# Patient Record
Sex: FEMALE | Race: WHITE | NOT HISPANIC OR LATINO | Employment: OTHER | URBAN - METROPOLITAN AREA
[De-identification: names, ages, dates, MRNs, and addresses within clinical notes are randomized per-mention and may not be internally consistent; named-entity substitution may affect disease eponyms.]

---

## 2023-04-14 LAB
CHOLESTEROL (MG/DL) IN SER/PLAS: 123 MG/DL (ref 0–199)
CHOLESTEROL IN HDL (MG/DL) IN SER/PLAS: 30.9 MG/DL
CHOLESTEROL/HDL RATIO: 4
LDL: 29 MG/DL (ref 0–99)
NON HDL CHOLESTEROL: 92 MG/DL
TRIGLYCERIDE (MG/DL) IN SER/PLAS: 317 MG/DL (ref 0–149)
VLDL: 63 MG/DL (ref 0–40)

## 2023-07-05 LAB
ALBUMIN (G/DL) IN SER/PLAS: 3.9 G/DL (ref 3.4–5)
ANION GAP IN SER/PLAS: 11 MMOL/L (ref 10–20)
CALCIUM (MG/DL) IN SER/PLAS: 8.9 MG/DL (ref 8.6–10.3)
CARBON DIOXIDE, TOTAL (MMOL/L) IN SER/PLAS: 24 MMOL/L (ref 21–32)
CHLORIDE (MMOL/L) IN SER/PLAS: 107 MMOL/L (ref 98–107)
CREATININE (MG/DL) IN SER/PLAS: 1.34 MG/DL (ref 0.5–1.05)
GFR FEMALE: 49 ML/MIN/1.73M2
GLUCOSE (MG/DL) IN SER/PLAS: 125 MG/DL (ref 74–99)
MAGNESIUM (MG/DL) IN SER/PLAS: 2.12 MG/DL (ref 1.6–2.4)
PHOSPHATE (MG/DL) IN SER/PLAS: 3.4 MG/DL (ref 2.5–4.9)
POTASSIUM (MMOL/L) IN SER/PLAS: 3.7 MMOL/L (ref 3.5–5.3)
SODIUM (MMOL/L) IN SER/PLAS: 138 MMOL/L (ref 136–145)
UREA NITROGEN (MG/DL) IN SER/PLAS: 11 MG/DL (ref 6–23)

## 2023-08-29 PROBLEM — L02.229 FURUNCLE OF TRUNK, UNSPECIFIED: Status: ACTIVE | Noted: 2022-05-16

## 2023-08-29 PROBLEM — D22.70 MELANOCYTIC NEVI OF LOWER EXTREMITY OR HIP: Status: ACTIVE | Noted: 2022-05-16

## 2023-08-29 PROBLEM — N18.4 STAGE 4 CHRONIC KIDNEY DISEASE (MULTI): Status: ACTIVE | Noted: 2023-08-29

## 2023-08-29 PROBLEM — E87.6 HYPOKALEMIA: Status: ACTIVE | Noted: 2023-08-29

## 2023-08-29 PROBLEM — Z72.0 TOBACCO ABUSE: Status: ACTIVE | Noted: 2023-08-29

## 2023-08-29 PROBLEM — L81.1 CHLOASMA: Status: ACTIVE | Noted: 2022-05-16

## 2023-08-29 PROBLEM — J34.3 HYPERTROPHY OF BOTH INFERIOR NASAL TURBINATES: Status: ACTIVE | Noted: 2023-08-29

## 2023-08-29 PROBLEM — F31.9 BIPOLAR DISORDER (MULTI): Status: ACTIVE | Noted: 2023-08-29

## 2023-08-29 PROBLEM — E78.1 HYPERTRIGLYCERIDEMIA: Status: ACTIVE | Noted: 2023-08-29

## 2023-08-29 PROBLEM — R21 SKIN ERUPTION: Status: ACTIVE | Noted: 2022-05-16

## 2023-08-29 PROBLEM — E78.1 HIGH TRIGLYCERIDES: Status: ACTIVE | Noted: 2023-08-29

## 2023-08-29 PROBLEM — R42 EPISODIC LIGHTHEADEDNESS: Status: ACTIVE | Noted: 2023-08-29

## 2023-08-29 PROBLEM — J34.2 NASAL SEPTAL DEVIATION: Status: ACTIVE | Noted: 2023-08-29

## 2023-08-29 PROBLEM — D53.9 ANEMIA, MACROCYTIC: Status: ACTIVE | Noted: 2023-08-29

## 2023-08-29 PROBLEM — L91.8 OTHER HYPERTROPHIC DISORDERS OF THE SKIN: Status: ACTIVE | Noted: 2022-05-16

## 2023-08-29 PROBLEM — K21.9 GASTROESOPHAGEAL REFLUX DISEASE: Status: ACTIVE | Noted: 2023-08-29

## 2023-08-29 PROBLEM — R13.10 DYSPHAGIA: Status: ACTIVE | Noted: 2023-08-29

## 2023-08-29 PROBLEM — E78.00 PURE HYPERCHOLESTEROLEMIA: Status: ACTIVE | Noted: 2023-08-29

## 2023-08-29 PROBLEM — I73.00 RAYNAUD PHENOMENON: Status: ACTIVE | Noted: 2023-08-29

## 2023-08-29 PROBLEM — K11.20 SUBMANDIBULAR SIALOADENITIS: Status: ACTIVE | Noted: 2023-08-29

## 2023-08-29 PROBLEM — D18.01 HEMANGIOMA OF SKIN AND SUBCUTANEOUS TISSUE: Status: ACTIVE | Noted: 2022-05-16

## 2023-08-29 PROBLEM — L73.2 HIDRADENITIS SUPPURATIVA: Status: ACTIVE | Noted: 2022-05-16

## 2023-08-29 PROBLEM — G89.29 CHRONIC BACK PAIN: Status: ACTIVE | Noted: 2023-08-29

## 2023-08-29 PROBLEM — R00.2 PALPITATIONS: Status: ACTIVE | Noted: 2023-08-29

## 2023-08-29 PROBLEM — R22.1 NECK SWELLING: Status: ACTIVE | Noted: 2023-08-29

## 2023-08-29 PROBLEM — L81.4 OTHER MELANIN HYPERPIGMENTATION: Status: ACTIVE | Noted: 2022-05-16

## 2023-08-29 PROBLEM — D22.60 MELANOCYTIC NEVI OF UNSPECIFIED UPPER LIMB, INCLUDING SHOULDER: Status: ACTIVE | Noted: 2022-05-16

## 2023-08-29 PROBLEM — J32.9 CHRONIC RHINOSINUSITIS: Status: ACTIVE | Noted: 2023-08-29

## 2023-08-29 PROBLEM — R80.9 PROTEINURIA: Status: ACTIVE | Noted: 2023-08-29

## 2023-08-29 PROBLEM — M54.9 CHRONIC BACK PAIN: Status: ACTIVE | Noted: 2023-08-29

## 2023-08-29 PROBLEM — H92.03 OTALGIA OF BOTH EARS: Status: ACTIVE | Noted: 2023-08-29

## 2023-08-29 PROBLEM — L72.8 OTHER FOLLICULAR CYSTS OF THE SKIN AND SUBCUTANEOUS TISSUE: Status: ACTIVE | Noted: 2022-05-16

## 2023-08-29 PROBLEM — D22.4 MELANOCYTIC NEVI OF SCALP AND NECK: Status: ACTIVE | Noted: 2022-05-16

## 2023-08-29 PROBLEM — D22.5 MELANOCYTIC NEVI OF TRUNK: Status: ACTIVE | Noted: 2022-05-16

## 2023-08-29 PROBLEM — G47.33 OBSTRUCTIVE SLEEP APNEA, ADULT: Status: ACTIVE | Noted: 2023-08-29

## 2023-08-29 PROBLEM — I20.9 ANGINA PECTORIS (CMS-HCC): Status: ACTIVE | Noted: 2023-08-29

## 2023-08-29 PROBLEM — M79.7 FIBROMYALGIA: Status: ACTIVE | Noted: 2023-08-29

## 2023-08-29 PROBLEM — D48.5 NEOPLASM OF UNCERTAIN BEHAVIOR OF SKIN: Status: ACTIVE | Noted: 2022-05-16

## 2023-08-29 PROBLEM — R00.0 TACHYCARDIA: Status: ACTIVE | Noted: 2023-08-29

## 2023-08-29 PROBLEM — J44.9 CHRONIC OBSTRUCTIVE PULMONARY DISEASE (MULTI): Status: ACTIVE | Noted: 2023-08-29

## 2023-08-29 PROBLEM — R42 VERTIGO: Status: ACTIVE | Noted: 2023-08-29

## 2023-08-29 PROBLEM — L02.92 BOIL: Status: ACTIVE | Noted: 2023-08-29

## 2023-08-29 PROBLEM — R19.7 DIARRHEA: Status: ACTIVE | Noted: 2023-08-29

## 2023-08-29 PROBLEM — M54.16 LUMBAR RADICULOPATHY: Status: ACTIVE | Noted: 2023-08-29

## 2023-08-29 PROBLEM — L82.1 OTHER SEBORRHEIC KERATOSIS: Status: ACTIVE | Noted: 2022-05-16

## 2023-08-29 PROBLEM — L85.9 EPIDERMAL THICKENING, UNSPECIFIED: Status: ACTIVE | Noted: 2022-05-16

## 2023-08-29 PROBLEM — K59.09 CHRONIC CONSTIPATION: Status: ACTIVE | Noted: 2023-08-29

## 2023-08-29 PROBLEM — G90.A POTS (POSTURAL ORTHOSTATIC TACHYCARDIA SYNDROME): Status: ACTIVE | Noted: 2023-08-29

## 2023-08-29 PROBLEM — R41.89 BRAIN FOG: Status: ACTIVE | Noted: 2023-08-29

## 2023-08-29 PROBLEM — R63.4 WEIGHT LOSS, ABNORMAL: Status: ACTIVE | Noted: 2023-08-29

## 2023-08-29 PROBLEM — Z87.448 HISTORY OF RENAL INSUFFICIENCY SYNDROME: Status: ACTIVE | Noted: 2023-08-29

## 2023-08-29 PROBLEM — M19.90 ARTHRITIS: Status: ACTIVE | Noted: 2023-08-29

## 2023-08-29 PROBLEM — E66.3 OVERWEIGHT: Status: ACTIVE | Noted: 2023-08-29

## 2023-08-29 PROBLEM — I25.10 CAD (CORONARY ARTERY DISEASE): Status: ACTIVE | Noted: 2023-08-29

## 2023-08-29 PROBLEM — S29.019A THORACIC MYOFASCIAL STRAIN: Status: ACTIVE | Noted: 2023-08-29

## 2023-08-29 PROBLEM — F41.9 ANXIETY: Status: ACTIVE | Noted: 2023-08-29

## 2023-08-29 PROBLEM — Z86.59 HISTORY OF BULIMIA NERVOSA: Status: ACTIVE | Noted: 2023-08-29

## 2023-08-29 PROBLEM — N18.30 CHRONIC KIDNEY DISEASE, STAGE III (MODERATE) (MULTI): Status: ACTIVE | Noted: 2023-08-29

## 2023-08-29 PROBLEM — R53.83 FATIGUE: Status: ACTIVE | Noted: 2023-08-29

## 2023-08-29 PROBLEM — N19 RENAL FAILURE: Status: ACTIVE | Noted: 2023-08-29

## 2023-08-29 PROBLEM — M12.9 ARTHROPATHY: Status: ACTIVE | Noted: 2023-08-29

## 2023-08-29 PROBLEM — K13.0 LESION OF LIP: Status: ACTIVE | Noted: 2023-08-29

## 2023-08-29 PROBLEM — R31.9 HEMATURIA: Status: ACTIVE | Noted: 2023-08-29

## 2023-08-29 PROBLEM — M89.9 DISORDER OF BONE, UNSPECIFIED: Status: ACTIVE | Noted: 2023-08-29

## 2023-08-29 RX ORDER — FENOFIBRATE 145 MG/1
1 TABLET, FILM COATED ORAL DAILY
COMMUNITY
Start: 2023-01-09

## 2023-08-29 RX ORDER — LAMOTRIGINE 100 MG/1
1 TABLET ORAL DAILY
COMMUNITY

## 2023-08-29 RX ORDER — AMILORIDE HYDROCHLORIDE 5 MG/1
1 TABLET ORAL DAILY
COMMUNITY
Start: 2021-02-23

## 2023-08-29 RX ORDER — EVOLOCUMAB 140 MG/ML
140 INJECTION, SOLUTION SUBCUTANEOUS
COMMUNITY
Start: 2021-09-27 | End: 2023-11-01 | Stop reason: SDUPTHER

## 2023-08-29 RX ORDER — FLUTICASONE PROPIONATE 44 UG/1
1 AEROSOL, METERED RESPIRATORY (INHALATION) 2 TIMES DAILY
COMMUNITY
Start: 2023-01-05 | End: 2023-11-14 | Stop reason: SDUPTHER

## 2023-08-29 RX ORDER — METOPROLOL SUCCINATE 25 MG/1
0.5 TABLET, EXTENDED RELEASE ORAL DAILY
COMMUNITY
Start: 2019-04-30 | End: 2023-10-05 | Stop reason: SDUPTHER

## 2023-08-29 RX ORDER — ALBUTEROL SULFATE 90 UG/1
2 AEROSOL, METERED RESPIRATORY (INHALATION) EVERY 4 HOURS PRN
COMMUNITY
Start: 2019-09-17

## 2023-08-29 RX ORDER — BENZOYL PEROXIDE 100 MG/ML
LIQUID TOPICAL
COMMUNITY
Start: 2019-09-18 | End: 2024-05-06 | Stop reason: SDUPTHER

## 2023-08-29 RX ORDER — FLUTICASONE PROPIONATE 93 UG/1
SPRAY, METERED NASAL
COMMUNITY
Start: 2021-03-11

## 2023-08-29 RX ORDER — FLUTICASONE PROPIONATE 110 UG/1
1 AEROSOL, METERED RESPIRATORY (INHALATION) 2 TIMES DAILY
COMMUNITY
Start: 2019-09-17 | End: 2024-05-06 | Stop reason: ALTCHOICE

## 2023-08-29 RX ORDER — CLOPIDOGREL BISULFATE 75 MG/1
1 TABLET ORAL DAILY
COMMUNITY
Start: 2021-09-27 | End: 2023-10-05 | Stop reason: SDUPTHER

## 2023-08-29 RX ORDER — CLONAZEPAM 1 MG/1
0.5 TABLET ORAL DAILY
COMMUNITY
Start: 2017-07-06

## 2023-08-29 RX ORDER — MECLIZINE HYDROCHLORIDE 25 MG/1
TABLET ORAL
COMMUNITY
Start: 2020-08-21 | End: 2023-11-14 | Stop reason: WASHOUT

## 2023-08-29 RX ORDER — ASPIRIN 81 MG/1
1 TABLET ORAL DAILY
COMMUNITY
Start: 2018-11-06

## 2023-08-29 RX ORDER — CHOLECALCIFEROL (VITAMIN D3) 25 MCG
25 TABLET ORAL DAILY
COMMUNITY

## 2023-08-29 RX ORDER — CYCLOBENZAPRINE HCL 5 MG
1 TABLET ORAL 3 TIMES DAILY PRN
COMMUNITY
Start: 2022-05-13

## 2023-08-29 RX ORDER — BENZONATATE 100 MG/1
CAPSULE ORAL
COMMUNITY
Start: 2022-09-12

## 2023-08-29 RX ORDER — AZELASTINE 1 MG/ML
1 SPRAY, METERED NASAL 2 TIMES DAILY
COMMUNITY
Start: 2021-09-28

## 2023-08-29 RX ORDER — ONDANSETRON 8 MG/1
8 TABLET, ORALLY DISINTEGRATING ORAL EVERY 8 HOURS PRN
COMMUNITY
Start: 2022-04-28 | End: 2024-05-06

## 2023-08-29 RX ORDER — IBUPROFEN 200 MG
TABLET ORAL
COMMUNITY
Start: 2023-03-09

## 2023-08-29 RX ORDER — ICOSAPENT ETHYL 1 G/1
2 CAPSULE ORAL 2 TIMES DAILY
COMMUNITY
Start: 2022-04-26 | End: 2024-05-24 | Stop reason: SDUPTHER

## 2023-08-29 RX ORDER — POTASSIUM CHLORIDE 750 MG/1
TABLET, FILM COATED, EXTENDED RELEASE ORAL
COMMUNITY
Start: 2021-07-12

## 2023-08-29 RX ORDER — NIFEDIPINE 30 MG/1
30 TABLET, FILM COATED, EXTENDED RELEASE ORAL
COMMUNITY
Start: 2023-03-09 | End: 2024-05-06

## 2023-09-30 ENCOUNTER — PHARMACY VISIT (OUTPATIENT)
Dept: PHARMACY | Facility: CLINIC | Age: 48
End: 2023-09-30
Payer: COMMERCIAL

## 2023-09-30 PROCEDURE — RXMED WILLOW AMBULATORY MEDICATION CHARGE

## 2023-10-03 NOTE — PROGRESS NOTES
Chief Complaint: Raegan is here for her 6 month check up  No chief complaint on file.     History Of Present Illness:    Raegan Perez is a 48 y.o. female presenting with ***.     Last Recorded Vitals:  There were no vitals filed for this visit.    Past Medical History:  She has a past medical history of Arteritis, unspecified (CMS/HCC) (09/22/2016), Cafe au lait spots (10/27/2017), Candidiasis, unspecified (07/07/2020), Disorder of breast, unspecified (06/16/2020), Disorder of the skin and subcutaneous tissue, unspecified (11/28/2017), Disorder of the skin and subcutaneous tissue, unspecified (11/28/2017), Encounter for gynecological examination (general) (routine) with abnormal findings (01/12/2017), Encounter for gynecological examination (general) (routine) without abnormal findings (08/04/2020), Encounter for gynecological examination (general) (routine) without abnormal findings (06/25/2019), Encounter for other preprocedural examination (03/19/2018), Encounter for screening mammogram for malignant neoplasm of breast (08/04/2020), Encounter for screening mammogram for malignant neoplasm of breast (06/25/2019), Left lower quadrant abdominal tenderness (05/07/2020), Menopausal and female climacteric states (10/27/2017), Menopausal and female climacteric states (09/15/2016), Menopausal and female climacteric states (01/11/2017), Noninfective gastroenteritis and colitis, unspecified (09/22/2016), Other chest pain (10/30/2019), Other conditions influencing health status (03/13/2020), Other skin changes (06/16/2020), Other specified noninflammatory disorders of vulva and perineum (07/20/2020), Pelvic and perineal pain, Pelvic and perineal pain (12/20/2016), Personal history of diseases of the blood and blood-forming organs and certain disorders involving the immune mechanism (09/22/2016), Personal history of nicotine dependence (03/19/2018), Personal history of other diseases of the circulatory system  (12/16/2016), Personal history of other diseases of the digestive system (08/23/2021), Personal history of other diseases of the female genital tract, Personal history of other diseases of the female genital tract (10/27/2017), Personal history of other diseases of the female genital tract (12/20/2016), Personal history of other diseases of the female genital tract (01/11/2017), Personal history of other diseases of the female genital tract (09/20/2017), Personal history of other diseases of the respiratory system, Personal history of other diseases of the respiratory system (09/26/2019), Personal history of other diseases of urinary system, Personal history of other endocrine, nutritional and metabolic disease, Personal history of other medical treatment (01/11/2017), Personal history of other specified conditions (01/26/2017), Personal history of other specified conditions (03/04/2019), Personal history of other specified conditions, Personal history of other specified conditions (09/22/2016), Personal history of other specified conditions (12/09/2019), Unspecified sprain of unspecified finger, initial encounter (09/26/2019), Unspecified symptoms and signs involving the genitourinary system, and Urinary tract infection, site not specified (07/10/2020).    Past Surgical History:  She has a past surgical history that includes Other surgical history (09/15/2016); Other surgical history (09/24/2021); Cervical biopsy w/ loop electrode excision (01/11/2017); Other surgical history (03/04/2019); Other surgical history (03/04/2019); Other surgical history (01/11/2017); Other surgical history (09/24/2021); Other surgical history (09/24/2021); MR angio neck wo IV contrast (3/26/2019); and MR angio head wo IV contrast (3/26/2019).      Social History:  She has no history on file for tobacco use, alcohol use, and drug use.    Family History:  Family History   Problem Relation Name Age of Onset    Breast cancer Mother       Coronary artery disease Father      Other (cardiac disorder) Father      Stroke Father      Hypertension Father      Memory loss Father      Skin cancer Father      Other (cardiac disorder) Sister      Hypertension Sister      Ovarian cancer Sister      Seizures Daughter      Skin cancer Daughter          Allergies:  Adhesive tape-silicones and Cephalexin    Outpatient Medications:  Current Outpatient Medications   Medication Instructions    aMILoride (Midamor) 5 mg tablet 1 tablet, oral, Daily    aspirin 81 mg EC tablet 1 tablet, oral, Daily    azelastine (Astelin) 137 mcg (0.1 %) nasal spray 1 spray, nasal, 2 times daily    benzonatate (Tessalon) 100 mg capsule oral    benzoyl peroxide (Benzac AC) 10 % external wash  WASH AFFECTED AREA WITH CLEANSER ONCE DAILY.    cholecalciferol (VITAMIN D-3) 25 mcg, oral, Daily    clonazePAM (KlonoPIN) 1 mg tablet 0.5 tablets, oral, Daily    clopidogrel (Plavix) 75 mg tablet 1 tablet, oral, Daily    cyclobenzaprine (Flexeril) 5 mg tablet 1 tablet, oral, 3 times daily PRN    evolocumab (Repatha SureClick) 140 mg/mL injection INJECT 140MG UNDER THE SKIN EVERY 2 WEEKS.    evolocumab (Repatha SureClick) 140 mg/mL injection INJECT ONE (1) PEN (140MG) UNDER THE SKIN ONCE EVERY 2 WEEKS    fenofibrate (Tricor) 145 mg tablet 1 tablet, oral, Daily    Flovent HFA 44 mcg/actuation inhaler 1 puff, inhalation, 2 times daily    fluticasone (Flovent HFA) 110 mcg/actuation inhaler 1 puff, inhalation, 2 times daily    icosapent ethyL (Vascepa) 1 gram capsule 2 capsules, oral, 2 times daily    lamoTRIgine (LaMICtal) 100 mg tablet 1 tablet, oral, Daily    meclizine (Antivert) 25 mg tablet oral,  Take one every 8 hours as needed for dizziness    metoprolol succinate XL (Toprol-XL) 25 mg 24 hr tablet 0.5 tablets, oral, Daily    nicotine (Nicoderm CQ) 14 mg/24 hr patch     NIFEdipine CC 30 mg 24 hr tablet     ondansetron ODT (ZOFRAN-ODT) 8 mg, oral, Every 8 hours PRN    potassium chloride CR 10 mEq ER  "tablet oral, TAKE 1 TABLET BY MOUTH ONCE DAILY WITH FOOD FOR 90 DAYS    Repatha SureClick 140 mg, subcutaneous, Every 14 days    Ventolin HFA 90 mcg/actuation inhaler 2 puffs, inhalation, Every 4 hours PRN    Xhance 93 mcg/actuation aerosol breath activated  Use 1 puff twice daily to bilateral sides       Physical Exam:  ***     Last Labs:  CBC -  Lab Results   Component Value Date    WBC 12.6 (H) 04/28/2022    HGB 12.9 04/28/2022    HCT 40.6 04/28/2022    MCV 97 04/28/2022     04/28/2022       CMP -  Lab Results   Component Value Date    CALCIUM 8.9 07/05/2023    PHOS 3.4 07/05/2023    PROT 6.7 04/28/2022    ALBUMIN 3.9 07/05/2023    AST 17 04/28/2022    ALT 16 04/28/2022    ALKPHOS 126 (H) 04/28/2022    BILITOT 0.3 04/28/2022       LIPID PANEL -   Lab Results   Component Value Date    CHOL 123 04/14/2023    TRIG 317 (H) 04/14/2023    HDL 30.9 (A) 04/14/2023    CHHDL 4.0 04/14/2023    LDLF 29 04/14/2023    VLDL 63 (H) 04/14/2023    NHDL 92 04/14/2023       RENAL FUNCTION PANEL -   Lab Results   Component Value Date    GLUCOSE 125 (H) 07/05/2023     07/05/2023    K 3.7 07/05/2023     07/05/2023    CO2 24 07/05/2023    ANIONGAP 11 07/05/2023    BUN 11 07/05/2023    CREATININE 1.34 (H) 07/05/2023    CALCIUM 8.9 07/05/2023    PHOS 3.4 07/05/2023    ALBUMIN 3.9 07/05/2023        No results found for: \"BNP\", \"HGBA1C\"    Last Cardiology Tests:  ECG:  No results found for this or any previous visit from the past 1095 days.    ***  Echo:  No results found for this or any previous visit from the past 1095 days.    ***  Ejection Fractions:  No results found for: \"EF\"  ***  Cath:  No results found for this or any previous visit from the past 1095 days.    ***  Stress Test:  Nuclear Stress Test 2/13/2023    ***  Cardiac Imaging:  No results found for this or any previous visit from the past 1095 days.    ***    {Lab/Diag/Rad Review:11488}    Assessment/Plan   {Assess/Plan SmartLinks:01401}  Assessed    · CAD " (coronary artery disease) (414.00) (I25.10)   · 10/8/19 DARIEN LAD       No definite angina. 10/2021 stress test with average exercise tolerance with no      ischemia/NL LVEF.      On DAPT/beta blocker/statin.Stop smoking.Follow.   · Anxiety (300.00) (F41.9)   · Very anxious about her heart health   · Chronic obstructive pulmonary disease, unspecified COPD type (496) (J44.9)   · Mild emphysema changes per 1/2017 CT-stop smoking.No wheeze on exam.   · Episodic lightheadedness (780.4) (R42)   · BP on low side-encourage po intake-   · Fibromyalgia (729.1) (M79.7)   · High triglycerides (272.1) (E78.1)   · 4/2022 UY=029-fgusjuz Vascepa      11/2022 TT=726 on Vascepa,add fenofibrate-recheck   · History of renal insufficiency syndrome (V13.09) (Z87.448)   · Pt recently seen in ED (12/28) and found to have Cr elevated at 2.79.      1/2022 Cr down to 1.63      7/2022 CR=1.3      1/2022 Cr=1.3      Following neprology   · Palpitations (785.1) (R00.2)   · 11/2022 15 day event monitor unremarkable      On low dose beta blocker   · Pure hypercholesterolemia (272.0) (E78.00)   · Pt on high intensity statin-1/2021  lipids with LDL=27..Follow heart healthy diet.      Was  instructed by renal to try and come off statin because of renal insuff-changed to      Repatha      11/2022 NTGPC=272-HNS N/A(high TG)   · Raynaud phenomenon (443.0) (I73.00)   · prescribed  low dose nifedipine but declined starting   · Tachycardia (785.0) (R00.0)   · In NSR today-on low dose beta blocker      Has frequent racing of heart-makes her anxious-   · Tobacco abuse (305.1) (Z72.0)   · Needs to stop smoking-discussed importance      She is cutting back-using nicotine patch   · Obstructive sleep apnea, adult (327.23) (G47.33)   · To start CPAP    Sheba Wall, CMA

## 2023-10-04 NOTE — ASSESSMENT & PLAN NOTE
Pt recently seen in ED (12/28) and found to have Cr elevated at 2.79.  1/2022 Cr down to 1.63  7/2022 CR=1.3  1/2022 Cr=1.3  Following neprology

## 2023-10-04 NOTE — ASSESSMENT & PLAN NOTE
4/5/2023 ... Needs to stop smoking d iscussed importance. She is cutting back - using nicotine patch.

## 2023-10-04 NOTE — ASSESSMENT & PLAN NOTE
10/8/19 DARIEN LAD  No definite angina but some EKG  changes and no functional testing since 10/2021 stress test -check stress test  On DAPT/beta blocker/statin. Stop smoking.

## 2023-10-04 NOTE — ASSESSMENT & PLAN NOTE
4/5/2023 ... In NSR today - on low dose beta blocker  Has frequent racing of heart - makes her anxious.

## 2023-10-04 NOTE — ASSESSMENT & PLAN NOTE
4/2022 OP=741 ... started Vascepa  11/2022 MK=503 ... on Vascepa, added fenofibrate   4/2023 ZK=583

## 2023-10-04 NOTE — ASSESSMENT & PLAN NOTE
Pt on high intensity statin -1/2021 ... lipids with LDL=27.. Follow heart healthy diet.  Was instructed by renal to try and come off statin because of renal insuff - changed to Repatha  Lipids good

## 2023-10-05 ENCOUNTER — OFFICE VISIT (OUTPATIENT)
Dept: CARDIOLOGY | Facility: CLINIC | Age: 48
End: 2023-10-05
Payer: COMMERCIAL

## 2023-10-05 VITALS
HEIGHT: 64 IN | WEIGHT: 148.4 LBS | SYSTOLIC BLOOD PRESSURE: 110 MMHG | OXYGEN SATURATION: 97 % | HEART RATE: 70 BPM | BODY MASS INDEX: 25.33 KG/M2 | DIASTOLIC BLOOD PRESSURE: 62 MMHG

## 2023-10-05 DIAGNOSIS — R00.2 PALPITATIONS: ICD-10-CM

## 2023-10-05 DIAGNOSIS — R42 EPISODIC LIGHTHEADEDNESS: ICD-10-CM

## 2023-10-05 DIAGNOSIS — I25.10 CORONARY ARTERY DISEASE, UNSPECIFIED VESSEL OR LESION TYPE, UNSPECIFIED WHETHER ANGINA PRESENT, UNSPECIFIED WHETHER NATIVE OR TRANSPLANTED HEART: Primary | ICD-10-CM

## 2023-10-05 DIAGNOSIS — E78.1 HIGH TRIGLYCERIDES: ICD-10-CM

## 2023-10-05 DIAGNOSIS — J44.9 CHRONIC OBSTRUCTIVE PULMONARY DISEASE, UNSPECIFIED COPD TYPE (MULTI): ICD-10-CM

## 2023-10-05 DIAGNOSIS — R94.31 ABNORMAL EKG: ICD-10-CM

## 2023-10-05 DIAGNOSIS — N18.30 STAGE 3 CHRONIC KIDNEY DISEASE, UNSPECIFIED WHETHER STAGE 3A OR 3B CKD (MULTI): ICD-10-CM

## 2023-10-05 DIAGNOSIS — Z72.0 TOBACCO ABUSE: ICD-10-CM

## 2023-10-05 DIAGNOSIS — E78.00 PURE HYPERCHOLESTEROLEMIA: ICD-10-CM

## 2023-10-05 PROCEDURE — 99214 OFFICE O/P EST MOD 30 MIN: CPT | Performed by: INTERNAL MEDICINE

## 2023-10-05 PROCEDURE — 93000 ELECTROCARDIOGRAM COMPLETE: CPT | Performed by: INTERNAL MEDICINE

## 2023-10-05 RX ORDER — DICLOFENAC SODIUM 10 MG/G
2 GEL TOPICAL 2 TIMES DAILY PRN
COMMUNITY
Start: 2023-10-04 | End: 2024-05-06

## 2023-10-05 RX ORDER — LIDOCAINE 50 MG/G
1 PATCH TOPICAL EVERY 24 HOURS
COMMUNITY
Start: 2023-10-04 | End: 2024-05-06

## 2023-10-05 RX ORDER — NYSTATIN 100000 [USP'U]/ML
5 SUSPENSION ORAL 4 TIMES DAILY
COMMUNITY
Start: 2023-04-18

## 2023-10-05 RX ORDER — CLOPIDOGREL BISULFATE 75 MG/1
75 TABLET ORAL DAILY
Qty: 90 TABLET | Refills: 3 | Status: SHIPPED | OUTPATIENT
Start: 2023-10-05 | End: 2023-11-01 | Stop reason: SDUPTHER

## 2023-10-05 RX ORDER — METOPROLOL SUCCINATE 25 MG/1
12.5 TABLET, EXTENDED RELEASE ORAL DAILY
Qty: 45 TABLET | Refills: 3 | Status: SHIPPED | OUTPATIENT
Start: 2023-10-05 | End: 2024-10-04

## 2023-10-05 RX ORDER — FLUTICASONE FUROATE, UMECLIDINIUM BROMIDE AND VILANTEROL TRIFENATATE 100; 62.5; 25 UG/1; UG/1; UG/1
1 POWDER RESPIRATORY (INHALATION)
COMMUNITY
Start: 2023-09-12 | End: 2024-05-06

## 2023-10-05 ASSESSMENT — PAIN SCALES - GENERAL: PAINLEVEL: 0-NO PAIN

## 2023-10-05 NOTE — PROGRESS NOTES
"Chief Complaint:   6 month f/u with EKG     History Of Present Illness:    Raegan Perez is a 48 y.o. female .  Some intermittent palpitations-feels heart racing when laying down  She does have HAGEN-cough/no wheeze   Has shoulder and leg pain  Intermittent vertigo/LH  No edema  Active-walks dog    Still smoking    Last Recorded Vitals:  Vitals:    10/05/23 1313   BP: 110/62   BP Location: Right arm   Patient Position: Sitting   BP Cuff Size: Adult   Pulse: 70   SpO2: 97%   Weight: 67.3 kg (148 lb 6.4 oz)   Height: 1.626 m (5' 4\")            Allergies:  Cat dander, Cephalexin, and Adhesive tape-silicones    Outpatient Medications:  Current Outpatient Medications   Medication Instructions    aMILoride (Midamor) 5 mg tablet 1 tablet, oral, Daily    aspirin 81 mg EC tablet 1 tablet, oral, Daily    azelastine (Astelin) 137 mcg (0.1 %) nasal spray 1 spray, nasal, 2 times daily    benzonatate (Tessalon) 100 mg capsule oral    benzoyl peroxide (Benzac AC) 10 % external wash  WASH AFFECTED AREA WITH CLEANSER ONCE DAILY.    cholecalciferol (VITAMIN D-3) 25 mcg, oral, Daily    clonazePAM (KlonoPIN) 1 mg tablet 0.5 tablets, oral, Daily    clopidogrel (Plavix) 75 mg tablet 1 tablet, oral, Daily    cyclobenzaprine (Flexeril) 5 mg tablet 1 tablet, oral, 3 times daily PRN    diclofenac sodium (VOLTAREN) 2 g, Topical, 2 times daily PRN    evolocumab (Repatha SureClick) 140 mg/mL injection INJECT 140MG UNDER THE SKIN EVERY 2 WEEKS.    evolocumab (Repatha SureClick) 140 mg/mL injection INJECT ONE (1) PEN (140MG) UNDER THE SKIN ONCE EVERY 2 WEEKS    fenofibrate (Tricor) 145 mg tablet 1 tablet, oral, Daily    Flovent HFA 44 mcg/actuation inhaler 1 puff, inhalation, 2 times daily    fluticasone (Flovent HFA) 110 mcg/actuation inhaler 1 puff, inhalation, 2 times daily    icosapent ethyL (Vascepa) 1 gram capsule 2 capsules, oral, 2 times daily    lamoTRIgine (LaMICtal) 100 mg tablet 1 tablet, oral, Daily    lidocaine (Lidoderm) 5 " % patch 1 patch, transdermal, Every 24 hours    meclizine (Antivert) 25 mg tablet oral,  Take one every 8 hours as needed for dizziness    metoprolol succinate XL (Toprol-XL) 25 mg 24 hr tablet 0.5 tablets, oral, Daily    nicotine (Nicoderm CQ) 14 mg/24 hr patch     NIFEdipine CC 30 mg 24 hr tablet     nystatin (Mycostatin) 100,000 unit/mL suspension 5 mL, oral, 4 times daily    ondansetron ODT (ZOFRAN-ODT) 8 mg, oral, Every 8 hours PRN    potassium chloride CR 10 mEq ER tablet oral, TAKE 1 TABLET BY MOUTH ONCE DAILY WITH FOOD FOR 90 DAYS    Repatha SureClick 140 mg, subcutaneous, Every 14 days    Trelegy Ellipta 100-62.5-25 mcg blister with device 1 puff, inhalation, Daily RT    Ventolin HFA 90 mcg/actuation inhaler 2 puffs, inhalation, Every 4 hours PRN    Xhance 93 mcg/actuation aerosol breath activated  Use 1 puff twice daily to bilateral sides       Physical Exam:   PHYSICAL EXAM:    Constitutional/General:  Alert and oriented x3, well appearing, nontoxic, and in NAD  Neck:  No increased JVP,no carotid bruits.  Respiratory:  Lungs clear to auscultation bilaterally, no wheezes, rales, or rhonchi, not in respiratory distress  Cardiovascular:  Regular rate, regular rhythm, no murmurs, gallops, or rubs, PMI nondisplaced, 2+ distal pulses  Chest:  No chest wall tenderness  GI:  Abdomen soft, nontender, nondistended, + BS, no organomegaly, no palpable masses, no rebound, guarding, or rigidity  Musculoskeletal:  Moves all extremities x4  Extremities: No peripheral edema  Integument: Skin warm and dry, no rashes  Neurologic:  No focal deficits  Psychiatric:  Normal affect    Vital Signs, Nursing Notes, Allergies, and Medications reviewed by me.     Last Labs:  CBC -  Lab Results   Component Value Date    WBC 12.6 (H) 04/28/2022    HGB 12.9 04/28/2022    HCT 40.6 04/28/2022    MCV 97 04/28/2022     04/28/2022       CMP -  Lab Results   Component Value Date    CALCIUM 8.9 07/05/2023    PHOS 3.4 07/05/2023    PROT  "6.7 04/28/2022    ALBUMIN 3.9 07/05/2023    AST 17 04/28/2022    ALT 16 04/28/2022    ALKPHOS 126 (H) 04/28/2022    BILITOT 0.3 04/28/2022       LIPID PANEL -   Lab Results   Component Value Date    CHOL 123 04/14/2023    TRIG 317 (H) 04/14/2023    HDL 30.9 (A) 04/14/2023    CHHDL 4.0 04/14/2023    LDLF 29 04/14/2023    VLDL 63 (H) 04/14/2023    NHDL 92 04/14/2023       RENAL FUNCTION PANEL -   Lab Results   Component Value Date    GLUCOSE 125 (H) 07/05/2023     07/05/2023    K 3.7 07/05/2023     07/05/2023    CO2 24 07/05/2023    ANIONGAP 11 07/05/2023    BUN 11 07/05/2023    CREATININE 1.34 (H) 07/05/2023    CALCIUM 8.9 07/05/2023    PHOS 3.4 07/05/2023    ALBUMIN 3.9 07/05/2023        No results found for: \"BNP\", \"HGBA1C\"        Lab review: I have personally reviewed the laboratory result(s)     Problem List Items Addressed This Visit       CAD (coronary artery disease) - Primary    Current Assessment & Plan     10/8/19 DARIEN LAD  No definite angina but some EKG  changes and no functional testing since 10/2021 stress test -check stress test  On DAPT/beta blocker/statin. Stop smoking.          Relevant Orders    ECG 12 Lead (Completed)    Chronic obstructive pulmonary disease (CMS/[facility])    Current Assessment & Plan     Mild emphysema changes per 1/2017 CT - stop smoking. No wheeze on exam.         Episodic lightheadedness    Current Assessment & Plan     BP on low side - encourage PO intake         Palpitations    Current Assessment & Plan     11/2022 15 day event monitor unremarkable  On low dose beta blocker         Pure hypercholesterolemia    Current Assessment & Plan     Pt on high intensity statin -1/2021 ... lipids with LDL=27.. Follow heart healthy diet.  Was instructed by renal to try and come off statin because of renal insuff - changed to Repatha  Lipids good         Chronic kidney disease, stage III (moderate) (CMS/[facility])    Current Assessment & Plan     Follows with renal  Stable         " Tobacco abuse    Current Assessment & Plan     4/5/2023 ... Needs to stop smoking d iscussed importance. She is cutting back - using nicotine patch.         High triglycerides    Current Assessment & Plan     4/2022 KO=770 ... started Vascepa  11/2022 OG=664 ... on Vascepa, added fenofibrate   4/2023 EL=597         Abnormal EKG    Overview     Has anterior T wave changes-check stress test               Stop smoking  Nuc stress test      Vinicius Swan, DO

## 2023-10-20 ENCOUNTER — HOSPITAL ENCOUNTER (OUTPATIENT)
Dept: CARDIOLOGY | Facility: CLINIC | Age: 48
Discharge: HOME | End: 2023-10-20
Payer: COMMERCIAL

## 2023-10-20 ENCOUNTER — ANCILLARY PROCEDURE (OUTPATIENT)
Dept: RADIOLOGY | Facility: CLINIC | Age: 48
End: 2023-10-20
Payer: COMMERCIAL

## 2023-10-20 DIAGNOSIS — I25.10 CORONARY ARTERY DISEASE, UNSPECIFIED VESSEL OR LESION TYPE, UNSPECIFIED WHETHER ANGINA PRESENT, UNSPECIFIED WHETHER NATIVE OR TRANSPLANTED HEART: ICD-10-CM

## 2023-10-20 DIAGNOSIS — I25.10 CAD (CORONARY ARTERY DISEASE): Primary | ICD-10-CM

## 2023-10-20 DIAGNOSIS — I25.10 CORONARY ARTERY DISEASE, UNSPECIFIED VESSEL OR LESION TYPE, UNSPECIFIED WHETHER ANGINA PRESENT, UNSPECIFIED WHETHER NATIVE OR TRANSPLANTED HEART: Primary | ICD-10-CM

## 2023-10-20 PROCEDURE — 78451 HT MUSCLE IMAGE SPECT SING: CPT

## 2023-10-20 PROCEDURE — 78452 HT MUSCLE IMAGE SPECT MULT: CPT | Performed by: INTERNAL MEDICINE

## 2023-10-20 PROCEDURE — 93017 CV STRESS TEST TRACING ONLY: CPT

## 2023-10-20 PROCEDURE — 93016 CV STRESS TEST SUPVJ ONLY: CPT | Performed by: INTERNAL MEDICINE

## 2023-11-01 DIAGNOSIS — I25.10 CORONARY ARTERY DISEASE, UNSPECIFIED VESSEL OR LESION TYPE, UNSPECIFIED WHETHER ANGINA PRESENT, UNSPECIFIED WHETHER NATIVE OR TRANSPLANTED HEART: Primary | ICD-10-CM

## 2023-11-01 RX ORDER — EVOLOCUMAB 140 MG/ML
140 INJECTION, SOLUTION SUBCUTANEOUS
Qty: 2 ML | Refills: 1 | Status: SHIPPED | OUTPATIENT
Start: 2023-11-01

## 2023-11-01 RX ORDER — CLOPIDOGREL BISULFATE 75 MG/1
75 TABLET ORAL DAILY
Qty: 30 TABLET | Refills: 0 | Status: CANCELLED | OUTPATIENT
Start: 2023-11-01

## 2023-11-01 RX ORDER — EVOLOCUMAB 140 MG/ML
140 INJECTION, SOLUTION SUBCUTANEOUS
Qty: 2 ML | Refills: 1 | Status: CANCELLED | OUTPATIENT
Start: 2023-11-01

## 2023-11-01 RX ORDER — CLOPIDOGREL BISULFATE 75 MG/1
75 TABLET ORAL DAILY
Qty: 30 TABLET | Refills: 0 | Status: SHIPPED | OUTPATIENT
Start: 2023-11-01 | End: 2023-11-14 | Stop reason: SDUPTHER

## 2023-11-13 PROBLEM — M48.02 CERVICAL STENOSIS OF SPINAL CANAL: Status: ACTIVE | Noted: 2018-02-06

## 2023-11-13 PROBLEM — L02.92 BOIL: Status: RESOLVED | Noted: 2023-08-29 | Resolved: 2023-11-13

## 2023-11-13 PROBLEM — G47.9 SLEEP DISTURBANCE: Status: ACTIVE | Noted: 2017-02-23

## 2023-11-13 PROBLEM — M25.562 CHRONIC PAIN OF BOTH KNEES: Status: ACTIVE | Noted: 2017-03-23

## 2023-11-13 PROBLEM — S46.811A STRAIN OF RIGHT LEVATOR SCAPULAE MUSCLE: Status: ACTIVE | Noted: 2017-02-23

## 2023-11-13 PROBLEM — E28.319 PREMATURE MENOPAUSE: Status: ACTIVE | Noted: 2017-02-01

## 2023-11-13 PROBLEM — M25.561 CHRONIC PAIN OF BOTH KNEES: Status: ACTIVE | Noted: 2017-03-23

## 2023-11-13 PROBLEM — G89.29 CHRONIC PAIN OF BOTH KNEES: Status: ACTIVE | Noted: 2017-03-23

## 2023-11-13 PROBLEM — M22.2X1 PATELLOFEMORAL PAIN SYNDROME OF RIGHT KNEE: Status: ACTIVE | Noted: 2017-09-12

## 2023-11-13 PROBLEM — F43.10 POSTTRAUMATIC STRESS DISORDER: Status: ACTIVE | Noted: 2017-02-27

## 2023-11-13 PROBLEM — G43.909 MIGRAINE WITHOUT STATUS MIGRAINOSUS, NOT INTRACTABLE: Status: ACTIVE | Noted: 2017-02-01

## 2023-11-13 PROBLEM — N18.4 STAGE 4 CHRONIC KIDNEY DISEASE (MULTI): Status: RESOLVED | Noted: 2023-08-29 | Resolved: 2023-11-13

## 2023-11-13 PROBLEM — F31.62 BIPOLAR 1 DISORDER, MIXED, MODERATE (MULTI): Status: ACTIVE | Noted: 2017-02-27

## 2023-11-13 PROBLEM — F31.9 BIPOLAR DISORDER (MULTI): Status: RESOLVED | Noted: 2023-08-29 | Resolved: 2023-11-13

## 2023-11-13 PROBLEM — E55.9 VITAMIN D DEFICIENCY: Status: ACTIVE | Noted: 2017-02-23

## 2023-11-14 ENCOUNTER — OFFICE VISIT (OUTPATIENT)
Dept: CARDIOLOGY | Facility: CLINIC | Age: 48
End: 2023-11-14
Payer: COMMERCIAL

## 2023-11-14 VITALS
SYSTOLIC BLOOD PRESSURE: 122 MMHG | HEART RATE: 89 BPM | BODY MASS INDEX: 25.64 KG/M2 | DIASTOLIC BLOOD PRESSURE: 76 MMHG | OXYGEN SATURATION: 96 % | WEIGHT: 149.4 LBS

## 2023-11-14 DIAGNOSIS — E78.1 HIGH TRIGLYCERIDES: Primary | ICD-10-CM

## 2023-11-14 DIAGNOSIS — J44.9 CHRONIC OBSTRUCTIVE PULMONARY DISEASE, UNSPECIFIED COPD TYPE (MULTI): ICD-10-CM

## 2023-11-14 DIAGNOSIS — E78.00 PURE HYPERCHOLESTEROLEMIA: ICD-10-CM

## 2023-11-14 DIAGNOSIS — R00.2 PALPITATIONS: ICD-10-CM

## 2023-11-14 DIAGNOSIS — I25.10 CORONARY ARTERY DISEASE, UNSPECIFIED VESSEL OR LESION TYPE, UNSPECIFIED WHETHER ANGINA PRESENT, UNSPECIFIED WHETHER NATIVE OR TRANSPLANTED HEART: ICD-10-CM

## 2023-11-14 DIAGNOSIS — Z72.0 TOBACCO ABUSE: ICD-10-CM

## 2023-11-14 PROCEDURE — 99214 OFFICE O/P EST MOD 30 MIN: CPT | Performed by: INTERNAL MEDICINE

## 2023-11-14 RX ORDER — DOCUSATE SODIUM 100 MG/1
100 CAPSULE, LIQUID FILLED ORAL AS NEEDED
COMMUNITY
End: 2024-05-06

## 2023-11-14 RX ORDER — CLOPIDOGREL BISULFATE 75 MG/1
75 TABLET ORAL DAILY
Qty: 90 TABLET | Refills: 3 | Status: SHIPPED | OUTPATIENT
Start: 2023-11-14 | End: 2024-11-13

## 2023-11-14 RX ORDER — RIZATRIPTAN BENZOATE 10 MG/1
1 TABLET ORAL AS NEEDED
COMMUNITY
Start: 2017-02-01 | End: 2023-11-14 | Stop reason: WASHOUT

## 2023-11-14 NOTE — PROGRESS NOTES
Chief Complaint:   Results (NM stress test)     History Of Present Illness:    Raegan Perez is a 48 y.o. female presenting with CV dz.  Still with palpitations at night-generally when laying down-every night.  Chronic SOB-unchanged.Has some cough-still smoking  Intermittent CP with stress  Has orthostatic LH-no syncope  No LE edema    Active-walks most days            Last Recorded Vitals:  Vitals:    11/14/23 1531   BP: 122/76   BP Location: Left arm   Patient Position: Sitting   Pulse: 89   SpO2: 96%   Weight: 67.8 kg (149 lb 6.4 oz)            Allergies:  Adhesive tape-silicones, Cat dander, and Cephalexin    Outpatient Medications:  Current Outpatient Medications   Medication Instructions    aMILoride (Midamor) 5 mg tablet 1 tablet, oral, Daily    aspirin 81 mg EC tablet 1 tablet, oral, Daily    azelastine (Astelin) 137 mcg (0.1 %) nasal spray 1 spray, nasal, 2 times daily    benzonatate (Tessalon) 100 mg capsule oral    benzoyl peroxide (Benzac AC) 10 % external wash  WASH AFFECTED AREA WITH CLEANSER ONCE DAILY.    cholecalciferol (VITAMIN D-3) 25 mcg, oral, Daily    clonazePAM (KlonoPIN) 1 mg tablet 0.5 tablets, oral, Daily    clopidogrel (PLAVIX) 75 mg, oral, Daily    cyclobenzaprine (Flexeril) 5 mg tablet 1 tablet, oral, 3 times daily PRN    diclofenac sodium (VOLTAREN) 2 g, Topical, 2 times daily PRN    docusate sodium (COLACE) 100 mg, oral, As needed    fenofibrate (Tricor) 145 mg tablet 1 tablet, oral, Daily    fluticasone (Flovent HFA) 110 mcg/actuation inhaler 1 puff, inhalation, 2 times daily    icosapent ethyL (Vascepa) 1 gram capsule 2 capsules, oral, 2 times daily    lamoTRIgine (LaMICtal) 100 mg tablet 1 tablet, oral, Daily    lidocaine (Lidoderm) 5 % patch 1 patch, transdermal, Every 24 hours    metoprolol succinate XL (TOPROL-XL) 12.5 mg, oral, Daily    nicotine (Nicoderm CQ) 14 mg/24 hr patch     NIFEdipine CC 30 mg 24 hr tablet Take 1 tablet (30 mg) by mouth.    nystatin  (Mycostatin) 100,000 unit/mL suspension 5 mL, oral, 4 times daily    ondansetron ODT (ZOFRAN-ODT) 8 mg, oral, Every 8 hours PRN    potassium chloride CR 10 mEq ER tablet oral, TAKE 1 TABLET BY MOUTH ONCE DAILY WITH FOOD FOR 90 DAYS    Repatha SureClick 140 mg, subcutaneous, Every 14 days    Trelegy Ellipta 100-62.5-25 mcg blister with device 1 puff, inhalation, Daily RT    Ventolin HFA 90 mcg/actuation inhaler 2 puffs, inhalation, Every 4 hours PRN    Xhance 93 mcg/actuation aerosol breath activated  Use 1 puff twice daily to bilateral sides       Physical Exam:  Constitutional:       General: Awake.      Appearance: Healthy appearance. Not in distress.   Neck:      Vascular: No JVR. JVD normal.   Pulmonary:      Effort: Pulmonary effort is normal.      Breath sounds: Normal breath sounds. No wheezing. No rhonchi. No rales.   Chest:      Chest wall: Not tender to palpatation.   Cardiovascular:      PMI at left midclavicular line. Normal rate. Regular rhythm. Normal S1. Normal S2.       Murmurs: There is no murmur.      No gallop.  No click. No rub.   Pulses:     Intact distal pulses.   Edema:     Peripheral edema absent.   Abdominal:      General: Bowel sounds are normal.      Palpations: Abdomen is soft.      Tenderness: There is no abdominal tenderness.   Musculoskeletal: Normal range of motion.         General: No tenderness. Skin:     General: Skin is warm and dry.   Neurological:      General: No focal deficit present.      Mental Status: Alert and oriented to person, place and time.          Last Labs:  CBC -  Lab Results   Component Value Date    WBC 12.6 (H) 04/28/2022    HGB 12.9 04/28/2022    HCT 40.6 04/28/2022    MCV 97 04/28/2022     04/28/2022       CMP -  Lab Results   Component Value Date    CALCIUM 8.9 07/05/2023    PHOS 3.4 07/05/2023    PROT 6.7 04/28/2022    ALBUMIN 3.9 07/05/2023    AST 17 04/28/2022    ALT 16 04/28/2022    ALKPHOS 126 (H) 04/28/2022    BILITOT 0.3 04/28/2022       LIPID  "PANEL -   Lab Results   Component Value Date    CHOL 123 04/14/2023    TRIG 317 (H) 04/14/2023    HDL 30.9 (A) 04/14/2023    CHHDL 4.0 04/14/2023    LDLF 29 04/14/2023    VLDL 63 (H) 04/14/2023    NHDL 92 04/14/2023       RENAL FUNCTION PANEL -   Lab Results   Component Value Date    GLUCOSE 125 (H) 07/05/2023     07/05/2023    K 3.7 07/05/2023     07/05/2023    CO2 24 07/05/2023    ANIONGAP 11 07/05/2023    BUN 11 07/05/2023    CREATININE 1.34 (H) 07/05/2023    CALCIUM 8.9 07/05/2023    PHOS 3.4 07/05/2023    ALBUMIN 3.9 07/05/2023        No results found for: \"BNP\", \"HGBA1C\"        Lab review: I have personally reviewed the laboratory result(s)       Problem List Items Addressed This Visit       CAD (coronary artery disease)    Overview     10/8/19 DARIEN LAD  Has atypical CP but no definite angina. 10/2023 stress test with good exercise tolerance with no ischemia/NL LVEF.  On DAPT/beta blocker/statin.Stop smoking.Follow.         Chronic obstructive pulmonary disease (CMS/[facility]CC)    Overview     Mild emphysema changes per 1/2017 CT-stop smoking. No wheeze on exam.         Palpitations    Overview     11/2022 15 day event monitor unremarkable  Recheck  On low dose beta blocker         Pure hypercholesterolemia    Overview     Pt on high intensity statin-1/2021  lipids with LDL=27..Follow heart healthy diet.  Was  instructed by renal to try and come off statin because of renal insuff-changed to Repatha  11/2022 PNLCT=817-APN N/A(high TG)         Tobacco abuse    Overview     Needs to stop smoking-discussed importance  She is cutting back-using nicotine patch         High triglycerides - Primary    Overview     4/2022 [id number] Vascepa  11/2022 DD=589 on Vascepa, added fenofibrate  4/2023 AH=991                  Vinicius Swan, DO  "

## 2023-11-16 ENCOUNTER — ANCILLARY PROCEDURE (OUTPATIENT)
Dept: CARDIOLOGY | Facility: CLINIC | Age: 48
End: 2023-11-16
Payer: COMMERCIAL

## 2023-11-16 DIAGNOSIS — R00.2 PALPITATIONS: ICD-10-CM

## 2023-11-16 PROCEDURE — 93272 ECG/REVIEW INTERPRET ONLY: CPT | Performed by: INTERNAL MEDICINE

## 2023-11-28 DIAGNOSIS — E78.00 PURE HYPERCHOLESTEROLEMIA: Primary | ICD-10-CM

## 2023-11-28 RX ORDER — EVOLOCUMAB 140 MG/ML
INJECTION, SOLUTION SUBCUTANEOUS
Qty: 6 ML | Refills: 1 | Status: SHIPPED | OUTPATIENT
Start: 2023-11-28 | End: 2024-05-06 | Stop reason: WASHOUT

## 2024-01-02 ENCOUNTER — LAB (OUTPATIENT)
Dept: LAB | Facility: LAB | Age: 49
End: 2024-01-02
Payer: COMMERCIAL

## 2024-01-02 DIAGNOSIS — N18.30 CHRONIC KIDNEY DISEASE, STAGE 3 UNSPECIFIED (MULTI): Primary | ICD-10-CM

## 2024-01-02 DIAGNOSIS — E87.6 HYPOKALEMIA: ICD-10-CM

## 2024-01-02 LAB
ALBUMIN SERPL BCP-MCNC: 4.1 G/DL (ref 3.4–5)
ANION GAP SERPL CALC-SCNC: 12 MMOL/L (ref 10–20)
APPEARANCE UR: CLEAR
BACTERIA #/AREA URNS AUTO: ABNORMAL /HPF
BILIRUB UR STRIP.AUTO-MCNC: NEGATIVE MG/DL
BUN SERPL-MCNC: 11 MG/DL (ref 6–23)
CALCIUM SERPL-MCNC: 8.9 MG/DL (ref 8.6–10.3)
CHLORIDE SERPL-SCNC: 104 MMOL/L (ref 98–107)
CO2 SERPL-SCNC: 27 MMOL/L (ref 21–32)
COLOR UR: ABNORMAL
CREAT SERPL-MCNC: 1.14 MG/DL (ref 0.5–1.05)
CREAT UR-MCNC: 26.7 MG/DL (ref 20–320)
GFR SERPL CREATININE-BSD FRML MDRD: 59 ML/MIN/1.73M*2
GLUCOSE SERPL-MCNC: 116 MG/DL (ref 74–99)
GLUCOSE UR STRIP.AUTO-MCNC: NEGATIVE MG/DL
KETONES UR STRIP.AUTO-MCNC: NEGATIVE MG/DL
LEUKOCYTE ESTERASE UR QL STRIP.AUTO: NEGATIVE
MAGNESIUM SERPL-MCNC: 1.87 MG/DL (ref 1.6–2.4)
MICROALBUMIN UR-MCNC: <7 MG/L
MICROALBUMIN/CREAT UR: NORMAL MG/G{CREAT}
NITRITE UR QL STRIP.AUTO: NEGATIVE
PH UR STRIP.AUTO: 6 [PH]
PHOSPHATE SERPL-MCNC: 3.8 MG/DL (ref 2.5–4.9)
POTASSIUM SERPL-SCNC: 4.5 MMOL/L (ref 3.5–5.3)
PROT UR STRIP.AUTO-MCNC: NEGATIVE MG/DL
RBC # UR STRIP.AUTO: ABNORMAL /UL
RBC #/AREA URNS AUTO: ABNORMAL /HPF
SODIUM SERPL-SCNC: 138 MMOL/L (ref 136–145)
SP GR UR STRIP.AUTO: 1
UROBILINOGEN UR STRIP.AUTO-MCNC: <2 MG/DL
WBC #/AREA URNS AUTO: ABNORMAL /HPF

## 2024-01-02 PROCEDURE — 81001 URINALYSIS AUTO W/SCOPE: CPT

## 2024-01-02 PROCEDURE — 80069 RENAL FUNCTION PANEL: CPT

## 2024-01-02 PROCEDURE — 82570 ASSAY OF URINE CREATININE: CPT

## 2024-01-02 PROCEDURE — 36415 COLL VENOUS BLD VENIPUNCTURE: CPT

## 2024-01-02 PROCEDURE — 82043 UR ALBUMIN QUANTITATIVE: CPT

## 2024-01-02 PROCEDURE — 83735 ASSAY OF MAGNESIUM: CPT

## 2024-01-04 ENCOUNTER — SPECIALTY PHARMACY (OUTPATIENT)
Dept: PHARMACY | Facility: CLINIC | Age: 49
End: 2024-01-04

## 2024-01-15 ENCOUNTER — OFFICE VISIT (OUTPATIENT)
Dept: DERMATOLOGY | Facility: CLINIC | Age: 49
End: 2024-01-15
Payer: COMMERCIAL

## 2024-01-15 DIAGNOSIS — L73.2 HIDRADENITIS SUPPURATIVA: Primary | ICD-10-CM

## 2024-01-15 DIAGNOSIS — L82.1 SEBORRHEIC KERATOSIS: ICD-10-CM

## 2024-01-15 PROCEDURE — 99214 OFFICE O/P EST MOD 30 MIN: CPT | Performed by: NURSE PRACTITIONER

## 2024-01-15 RX ORDER — BENZOYL PEROXIDE 100 MG/ML
1 LIQUID TOPICAL DAILY
Qty: 237 G | Refills: 11 | Status: SHIPPED | OUTPATIENT
Start: 2024-01-15 | End: 2025-01-14

## 2024-01-15 RX ORDER — CLINDAMYCIN PHOSPHATE 10 UG/ML
LOTION TOPICAL 2 TIMES DAILY
Qty: 60 ML | Refills: 1 | Status: SHIPPED | OUTPATIENT
Start: 2024-01-15

## 2024-01-15 NOTE — PROGRESS NOTES
Subjective     Raegan Perez is a 48 y.o. female who presents for the following: Hidradenitis Suppurativa.     Established patient in for follow up of HS to abdomen, groin, and breasts using BPO 10% wash and clindamycin 1% lotion.   Some flaring today inframammary folds, patient has been out of prescription for months.     Review of Systems:  No other skin or systemic complaints other than what is documented elsewhere in the note.    The following portions of the chart were reviewed this encounter and updated as appropriate:       Skin Cancer History  No skin cancer on file.    Specialty Problems          Dermatology Problems    Chloasma    Epidermal thickening, unspecified    Hemangioma of skin and subcutaneous tissue    Hidradenitis suppurativa    Melanocytic nevi of lower extremity or hip    Melanocytic nevi of scalp and neck    Melanocytic nevi of trunk    Melanocytic nevi of unspecified upper limb, including shoulder    Neoplasm of uncertain behavior of skin    Other follicular cysts of the skin and subcutaneous tissue    Other hypertrophic disorders of the skin    Other melanin hyperpigmentation    Other seborrheic keratosis    Skin eruption     Past Medical History:  Raegan Perez  has a past medical history of Arteritis, unspecified (CMS/HCC) (09/22/2016), Cafe au lait spots (10/27/2017), Candidiasis, unspecified (07/07/2020), Disorder of breast, unspecified (06/16/2020), Disorder of the skin and subcutaneous tissue, unspecified (11/28/2017), Disorder of the skin and subcutaneous tissue, unspecified (11/28/2017), Encounter for gynecological examination (general) (routine) with abnormal findings (01/12/2017), Encounter for gynecological examination (general) (routine) without abnormal findings (08/04/2020), Encounter for gynecological examination (general) (routine) without abnormal findings (06/25/2019), Encounter for other preprocedural examination (03/19/2018), Encounter for screening  mammogram for malignant neoplasm of breast (08/04/2020), Encounter for screening mammogram for malignant neoplasm of breast (06/25/2019), Left lower quadrant abdominal tenderness (05/07/2020), Menopausal and female climacteric states (10/27/2017), Menopausal and female climacteric states (09/15/2016), Menopausal and female climacteric states (01/11/2017), Noninfective gastroenteritis and colitis, unspecified (09/22/2016), Other chest pain (10/30/2019), Other conditions influencing health status (03/13/2020), Other skin changes (06/16/2020), Other specified noninflammatory disorders of vulva and perineum (07/20/2020), Pelvic and perineal pain, Pelvic and perineal pain (12/20/2016), Personal history of diseases of the blood and blood-forming organs and certain disorders involving the immune mechanism (09/22/2016), Personal history of nicotine dependence (03/19/2018), Personal history of other diseases of the circulatory system (12/16/2016), Personal history of other diseases of the digestive system (08/23/2021), Personal history of other diseases of the female genital tract, Personal history of other diseases of the female genital tract (10/27/2017), Personal history of other diseases of the female genital tract (12/20/2016), Personal history of other diseases of the female genital tract (01/11/2017), Personal history of other diseases of the female genital tract (09/20/2017), Personal history of other diseases of the respiratory system, Personal history of other diseases of the respiratory system (09/26/2019), Personal history of other diseases of urinary system, Personal history of other endocrine, nutritional and metabolic disease, Personal history of other medical treatment (01/11/2017), Personal history of other specified conditions (01/26/2017), Personal history of other specified conditions (03/04/2019), Personal history of other specified conditions, Personal history of other specified conditions (09/22/2016),  Personal history of other specified conditions (12/09/2019), Unspecified sprain of unspecified finger, initial encounter (09/26/2019), Unspecified symptoms and signs involving the genitourinary system, and Urinary tract infection, site not specified (07/10/2020).    Past Surgical History:  Raegan Perez  has a past surgical history that includes Other surgical history (09/15/2016); Other surgical history (09/24/2021); Cervical biopsy w/ loop electrode excision (01/11/2017); Other surgical history (03/04/2019); Other surgical history (03/04/2019); Other surgical history (01/11/2017); Other surgical history (09/24/2021); Other surgical history (09/24/2021); MR angio neck wo IV contrast (3/26/2019); and MR angio head wo IV contrast (3/26/2019).    Family History:  Patient family history includes Breast cancer in her mother; Coronary artery disease in her father; Hypertension in her father and sister; Memory loss in her father; Ovarian cancer in her sister; Seizures in her daughter; Skin cancer in her daughter and father; Stroke in her father; cardiac disorder in her father and sister.    Social History:  Raegan Perez  reports that she has been smoking cigarettes. She has never used smokeless tobacco. No history on file for alcohol use and drug use.    Allergies:  Adhesive tape-silicones, Cat dander, and Cephalexin    Current Medications / CAM's:    Current Outpatient Medications:     aMILoride (Midamor) 5 mg tablet, Take 1 tablet (5 mg) by mouth once daily., Disp: , Rfl:     aspirin 81 mg EC tablet, Take 1 tablet (81 mg) by mouth once daily., Disp: , Rfl:     azelastine (Astelin) 137 mcg (0.1 %) nasal spray, Administer 1 spray into affected nostril(s) 2 times a day., Disp: , Rfl:     benzonatate (Tessalon) 100 mg capsule, Take by mouth., Disp: , Rfl:     benzoyl peroxide (Benzac AC) 10 % external wash, WASH AFFECTED AREA WITH CLEANSER ONCE DAILY., Disp: , Rfl:     benzoyl peroxide (Benzac AC) 10  % external wash, Apply 1 Application topically once daily., Disp: 237 g, Rfl: 11    cholecalciferol (Vitamin D-3) 25 MCG (1000 UT) tablet, Take 1 tablet (25 mcg) by mouth once daily., Disp: , Rfl:     clindamycin (Cleocin T) 1 % lotion, Apply topically 2 times a day., Disp: 60 mL, Rfl: 1    clonazePAM (KlonoPIN) 1 mg tablet, Take 0.5 tablets (0.5 mg) by mouth once daily., Disp: , Rfl:     clopidogrel (Plavix) 75 mg tablet, Take 1 tablet (75 mg) by mouth once daily., Disp: 90 tablet, Rfl: 3    cyclobenzaprine (Flexeril) 5 mg tablet, Take 1 tablet (5 mg) by mouth 3 times a day as needed., Disp: , Rfl:     diclofenac sodium (Voltaren) 1 % gel gel, Apply 0.5 Applications topically 2 times a day as needed (for pain)., Disp: , Rfl:     docusate sodium (Colace) 100 mg capsule, Take 1 capsule (100 mg) by mouth if needed., Disp: , Rfl:     evolocumab (Repatha SureClick) 140 mg/mL injection, INJECT 140MG (1 PEN) UNDER THE SKIN EVERY 2 WEEKS., Disp: 6 mL, Rfl: 1    fenofibrate (Tricor) 145 mg tablet, Take 1 tablet (145 mg) by mouth once daily., Disp: , Rfl:     fluticasone (Flovent HFA) 110 mcg/actuation inhaler, Inhale 1 puff twice a day., Disp: , Rfl:     icosapent ethyL (Vascepa) 1 gram capsule, Take 2 capsules (2 g) by mouth 2 times a day., Disp: , Rfl:     lamoTRIgine (LaMICtal) 100 mg tablet, Take 1 tablet (100 mg) by mouth once daily., Disp: , Rfl:     lidocaine (Lidoderm) 5 % patch, Place 1 patch on the skin once every 24 hours., Disp: , Rfl:     metoprolol succinate XL (Toprol-XL) 25 mg 24 hr tablet, Take 0.5 tablets (12.5 mg) by mouth once daily., Disp: 45 tablet, Rfl: 3    nicotine (Nicoderm CQ) 14 mg/24 hr patch, , Disp: , Rfl:     NIFEdipine CC 30 mg 24 hr tablet, Take 1 tablet (30 mg) by mouth., Disp: , Rfl:     nystatin (Mycostatin) 100,000 unit/mL suspension, Take 5 mL (500,000 Units) by mouth 4 times a day., Disp: , Rfl:     ondansetron ODT (Zofran-ODT) 8 mg disintegrating tablet, Take 1 tablet (8 mg) by mouth  "every 8 hours if needed for nausea or vomiting., Disp: , Rfl:     potassium chloride CR 10 mEq ER tablet, Take by mouth. TAKE 1 TABLET BY MOUTH ONCE DAILY WITH FOOD FOR 90 DAYS, Disp: , Rfl:     Repatha SureClick 140 mg/mL injection, Inject 1 mL (140 mg) under the skin every 14 (fourteen) days., Disp: 2 mL, Rfl: 1    Trelegy Ellipta 100-62.5-25 mcg blister with device, Inhale 1 puff once daily., Disp: , Rfl:     Ventolin HFA 90 mcg/actuation inhaler, Inhale 2 puffs every 4 hours if needed., Disp: , Rfl:     Xhance 93 mcg/actuation aerosol breath activated, Use 1 puff twice daily to bilateral sides, Disp: , Rfl:     Current Facility-Administered Medications:     Tc-99m tetrofosmin (Myoview) injection 10.1 millicurie, 10.1 millicurie, intravenous, Once in imaging, Vinicius Swan, DO    Tc-99m tetrofosmin (Myoview) injection 30.5 millicurie, 30.5 millicurie, intravenous, Once in imaging, Vinicius Swan, DO     Objective   Well appearing patient in no apparent distress; mood and affect are within normal limits.        Assessment/Plan   1. Hidradenitis suppurativa  Left Inframammary Fold, Pubic, Right Inframammary Fold  Recurring tender, erythematous nodules, pustules, and abscesses that occur weeks or months apart. Disease progression with formation of tunnels, scars, and chronic purulent drainage can occur.    Hidradenitis suppurativa (HS), or acne inversa, is a chronic destructive inflammatory disorder of the hair follicles. While the pathogenesis is unclear, it is thought that follicular rupture initiates interaction between the follicular microbiome and innate immune system, triggering an inflammatory response.    HS is seen most commonly on the buttocks, breasts, groin, and axillae. Usually, the onset of the disease occurs soon after puberty, and patients typically report recurring \"boils.\" Pain is consistently reported as the most bothersome symptom, but itching, drainage, odor, fatigue, and arthralgias " "are frequent additional concerns.    Mechanical irritation such as by friction from tight clothing or shaving is often reported as a trigger. For many women, the week before menses can trigger disease flares, and pregnancy and the postpartum period can be associated with either disease improvement or flaring.    Obesity and cigarette smoking are associated with HS severity. Prevalence of metabolic syndrome, major cardiovascular events, cardiac death, and diabetes (type 1 or type 2) are increased in HS compared to the general populations. Some of the most frequently associated comorbidities are related to mental health, and depression, anxiety, and risk of suicide are major burdens for the population.     A positive family history is reported in 30%-50% of patients.     PLAN:  Restart clindamycin 1% lotion daily  Restart BPO 10% wash daily      Related Medications  clindamycin (Cleocin T) 1 % lotion  Apply topically 2 times a day.    benzoyl peroxide (Benzac AC) 10 % external wash  Apply 1 Application topically once daily.    2. Seborrheic keratosis  Right Abdomen (side) - Upper    Seborrheic keratoses (SKs) are extremely common benign neoplasms of the skin. There can be few or hundreds of these raised, \"stuck-on\"-appearing papules and plaques with well-defined borders. The cause is unknown, although there is a familial trait for the development of multiple SKs.      SKs tend to increase in incidence and number with increasing age.     Skin Care: Seborrheic Keratoses are benign. No treatment is necessary.    Patient was instructed to call the office if any lesions become irritated or inflamed               "

## 2024-04-22 PROBLEM — R55 SYNCOPE: Status: ACTIVE | Noted: 2024-04-22

## 2024-05-06 ENCOUNTER — OFFICE VISIT (OUTPATIENT)
Dept: PRIMARY CARE | Facility: CLINIC | Age: 49
End: 2024-05-06
Payer: COMMERCIAL

## 2024-05-06 VITALS
HEART RATE: 90 BPM | TEMPERATURE: 97.5 F | BODY MASS INDEX: 26.74 KG/M2 | DIASTOLIC BLOOD PRESSURE: 70 MMHG | WEIGHT: 155.8 LBS | OXYGEN SATURATION: 96 % | SYSTOLIC BLOOD PRESSURE: 126 MMHG

## 2024-05-06 DIAGNOSIS — Z72.0 TOBACCO ABUSE: ICD-10-CM

## 2024-05-06 DIAGNOSIS — H69.93 DYSFUNCTION OF BOTH EUSTACHIAN TUBES: ICD-10-CM

## 2024-05-06 DIAGNOSIS — M48.02 CERVICAL STENOSIS OF SPINAL CANAL: ICD-10-CM

## 2024-05-06 DIAGNOSIS — R73.9 HYPERGLYCEMIA: Primary | ICD-10-CM

## 2024-05-06 DIAGNOSIS — R06.6 SPASM OF DIAPHRAGM: ICD-10-CM

## 2024-05-06 DIAGNOSIS — M47.816 OSTEOARTHRITIS OF LUMBAR SPINE, UNSPECIFIED SPINAL OSTEOARTHRITIS COMPLICATION STATUS: ICD-10-CM

## 2024-05-06 LAB — HBA1C MFR BLD: 5.7 % (ref 4.2–6.5)

## 2024-05-06 PROCEDURE — 99215 OFFICE O/P EST HI 40 MIN: CPT | Performed by: FAMILY MEDICINE

## 2024-05-06 PROCEDURE — 83036 HEMOGLOBIN GLYCOSYLATED A1C: CPT | Mod: CLIA WAIVED TEST | Performed by: FAMILY MEDICINE

## 2024-05-06 RX ORDER — TIOTROPIUM BROMIDE 18 UG/1
1 CAPSULE ORAL; RESPIRATORY (INHALATION)
COMMUNITY
End: 2024-05-14 | Stop reason: SDUPTHER

## 2024-05-06 ASSESSMENT — ENCOUNTER SYMPTOMS: DEPRESSION: 1

## 2024-05-06 ASSESSMENT — PAIN SCALES - GENERAL: PAINLEVEL: 6

## 2024-05-06 NOTE — PROGRESS NOTES
Subjective   Patient ID: Raegan Perez is a 48 y.o. female who presents for Follow-up (Follow up. ).    HPI     EARS FEELING MUFFLED-  2 weeks.  No URI.  Deaf when young.  Went to Urgent care and told has fluid in ears.  Put on antibx. No help.  BEVERLEY HORSES across diaphragm.  X 2 years.  Was reaching and felt pain in right flank.  Ever since, gets pain across anterior diaphragm.  Noted when pushes to diaphragm.  Trreadmill and eliptical.  Review of Systems    Objective   /70 (BP Location: Left arm, Patient Position: Sitting, BP Cuff Size: Adult)   Pulse 90   Temp 36.4 °C (97.5 °F) (Temporal)   Wt 70.7 kg (155 lb 12.8 oz)   SpO2 96%   BMI 26.74 kg/m²     Physical Exam  Alert, pleasant and in no acute distress.  HEENT: Normocephalic, atraumatic.  Ears: Canals clear.  Tympanic membranes intact and pearly gray.  Nose: Nares reveal mildly inflamed turbinates.  Mouth: No mucosal lesions noted.  No pharyngeal erythema.  Neck: Supple.  No palpable lymphadenopathy.  Heart: Regular rate and rhythm without murmur  Lungs: Clear to auscultation  Abd: Mild epigastric tenderness.  No mass.  Assessment/Plan   Problem List Items Addressed This Visit             ICD-10-CM    Tobacco abuse Z72.0    Cervical stenosis of spinal canal M48.02     Other Visit Diagnoses         Codes    Hyperglycemia    -  Primary R73.9    Relevant Orders    POCT Glycosylated Hemoglobin (HGB A1C) docked device    Spasm of diaphragm     R06.6    Dysfunction of both eustachian tubes     H69.93    Osteoarthritis of lumbar spine, unspecified spinal osteoarthritis complication status     M47.816        Patient here with multiple medical problems.  Chart reviewed in detail.  Previous labs, x-rays and consults as well as procedures were reviewed with patient.  1.  Hyperglycemia: Recent sugars elevated on chemistry panels.  A1c 5.7 today.  Discussed diet and exercise and keeping his sugars in check.  2.  Spasm of diaphragm: I believe  patient is getting some spasms of her diaphragm.  Good bowel hygiene and spinal exercises recommended to maintain good posture.  3.  Eustachian tube dysfunction bilateral: Patient education regarding eustachian tube dysfunction.  Encouraged quitting smoking.  Afrin 3 days on 1 day off recommended as a trial for 3 cycles  4.  Tobacco abuse: Avenues to quitting were reviewed.  Stressed importance in light of patient's medical history including CAD with stent  5.  Cervical stenosis/osteoarthritis lumbar: With spinal disease, recommend therapeutic exercises on a daily basis.  Website information provided.  Stressed importance of healthy posture.  Will see patient back in 4 months to check an A1c and to follow-up on medical problems.

## 2024-05-14 ENCOUNTER — OFFICE VISIT (OUTPATIENT)
Dept: CARDIOLOGY | Facility: CLINIC | Age: 49
End: 2024-05-14
Payer: COMMERCIAL

## 2024-05-14 VITALS
OXYGEN SATURATION: 96 % | WEIGHT: 155.2 LBS | DIASTOLIC BLOOD PRESSURE: 70 MMHG | BODY MASS INDEX: 26.64 KG/M2 | HEART RATE: 90 BPM | SYSTOLIC BLOOD PRESSURE: 106 MMHG

## 2024-05-14 DIAGNOSIS — R42 EPISODIC LIGHTHEADEDNESS: ICD-10-CM

## 2024-05-14 DIAGNOSIS — G47.33 OBSTRUCTIVE SLEEP APNEA, ADULT: ICD-10-CM

## 2024-05-14 DIAGNOSIS — Z72.0 TOBACCO ABUSE: ICD-10-CM

## 2024-05-14 DIAGNOSIS — E78.00 PURE HYPERCHOLESTEROLEMIA: ICD-10-CM

## 2024-05-14 DIAGNOSIS — R00.2 PALPITATIONS: ICD-10-CM

## 2024-05-14 DIAGNOSIS — E78.1 HIGH TRIGLYCERIDES: Primary | ICD-10-CM

## 2024-05-14 DIAGNOSIS — I25.10 CORONARY ARTERY DISEASE, UNSPECIFIED VESSEL OR LESION TYPE, UNSPECIFIED WHETHER ANGINA PRESENT, UNSPECIFIED WHETHER NATIVE OR TRANSPLANTED HEART: ICD-10-CM

## 2024-05-14 DIAGNOSIS — J44.9 CHRONIC OBSTRUCTIVE PULMONARY DISEASE, UNSPECIFIED COPD TYPE (MULTI): ICD-10-CM

## 2024-05-14 PROCEDURE — 99214 OFFICE O/P EST MOD 30 MIN: CPT | Performed by: INTERNAL MEDICINE

## 2024-05-14 RX ORDER — FLUTICASONE PROPIONATE AND SALMETEROL XINAFOATE 230; 21 UG/1; UG/1
AEROSOL, METERED RESPIRATORY (INHALATION)
COMMUNITY
Start: 2024-05-11

## 2024-05-14 RX ORDER — TIOTROPIUM BROMIDE INHALATION SPRAY 3.12 UG/1
2 SPRAY, METERED RESPIRATORY (INHALATION) DAILY
COMMUNITY
Start: 2024-05-06

## 2024-05-14 RX ORDER — CLOBETASOL PROPIONATE 0.5 MG/G
OINTMENT TOPICAL
COMMUNITY

## 2024-05-14 NOTE — PROGRESS NOTES
Chief Complaint:   Follow-up (6 month/Still gets racing heart but is dealing with anxiety/Is it okay to fly?)     History Of Present Illness:    Raegan Perez is a 48 y.o. female presenting with CV dz.  Still with palpitations at night-generally when laying down-every night.  Chronic SOB-unchanged.Has some cough-still smoking  Intermittent CP with stress  Has orthostatic LH-no syncope  No LE edema    Active-walks dogs/gardens/cuts grass           Last Recorded Vitals:  Vitals:    05/14/24 1409   BP: 106/70   BP Location: Left arm   Patient Position: Sitting   Pulse: 90   SpO2: 96%   Weight: 70.4 kg (155 lb 3.2 oz)            Allergies:  Adhesive tape-silicones, Cat dander, and Cephalexin    Outpatient Medications:  Current Outpatient Medications   Medication Instructions    Advair -21 mcg/actuation inhaler     aMILoride (Midamor) 5 mg tablet 1 tablet, oral, Daily    aspirin 81 mg EC tablet 1 tablet, oral, Daily    azelastine (Astelin) 137 mcg (0.1 %) nasal spray 1 spray, nasal, 2 times daily    benzonatate (Tessalon) 100 mg capsule oral    benzoyl peroxide (Benzac AC) 10 % external wash 1 Application, Topical, Daily    cholecalciferol (VITAMIN D-3) 25 mcg, oral, Daily    clindamycin (Cleocin T) 1 % lotion Topical, 2 times daily    clobetasol (Temovate) 0.05 % ointment APPLY 1 APPLICATION 2 TIMES PER DAY AS NEEDED FOR 14 DAYS    clonazePAM (KlonoPIN) 1 mg tablet 0.5 tablets, oral, Daily    clopidogrel (PLAVIX) 75 mg, oral, Daily    cyclobenzaprine (Flexeril) 5 mg tablet 1 tablet, oral, 3 times daily PRN    fenofibrate (Tricor) 145 mg tablet 1 tablet, oral, Daily    icosapent ethyL (Vascepa) 1 gram capsule 2 capsules, oral, 2 times daily    lamoTRIgine (LaMICtal) 100 mg tablet 1 tablet, oral, Daily    metoprolol succinate XL (TOPROL-XL) 12.5 mg, oral, Daily    nicotine (Nicoderm CQ) 14 mg/24 hr patch     nystatin (Mycostatin) 100,000 unit/mL suspension 5 mL, oral, 4 times daily    potassium chloride  CR 10 mEq ER tablet oral, TAKE 1 TABLET BY MOUTH ONCE DAILY WITH FOOD FOR 90 DAYS    Repatha SureClick 140 mg, subcutaneous, Every 14 days    Spiriva Respimat 2.5 mcg/actuation inhaler 2 puffs, inhalation, Daily    Ventolin HFA 90 mcg/actuation inhaler 2 puffs, inhalation, Every 4 hours PRN    Xhance 93 mcg/actuation aerosol breath activated  Use 1 puff twice daily to bilateral sides       Physical Exam:  Constitutional:       General: Awake.      Appearance: Healthy appearance. Not in distress.   Neck:      Vascular: No JVR. JVD normal.   Pulmonary:      Effort: Pulmonary effort is normal.      Breath sounds: Normal breath sounds. No wheezing. No rhonchi. No rales.   Chest:      Chest wall: Not tender to palpatation.   Cardiovascular:      PMI at left midclavicular line. Normal rate. Regular rhythm. Normal S1. Normal S2.       Murmurs: There is no murmur.      No gallop.  No click. No rub.   Pulses:     Intact distal pulses.   Edema:     Peripheral edema absent.   Abdominal:      General: Bowel sounds are normal.      Palpations: Abdomen is soft.      Tenderness: There is no abdominal tenderness.   Musculoskeletal: Normal range of motion.         General: No tenderness. Skin:     General: Skin is warm and dry.   Neurological:      General: No focal deficit present.      Mental Status: Alert and oriented to person, place and time.          Last Labs:  CBC -  Lab Results   Component Value Date    WBC 12.6 (H) 04/28/2022    HGB 12.9 04/28/2022    HCT 40.6 04/28/2022    MCV 97 04/28/2022     04/28/2022       CMP -  Lab Results   Component Value Date    CALCIUM 8.9 01/02/2024    PHOS 3.8 01/02/2024    PROT 6.7 04/28/2022    ALBUMIN 4.1 01/02/2024    AST 17 04/28/2022    ALT 16 04/28/2022    ALKPHOS 126 (H) 04/28/2022    BILITOT 0.3 04/28/2022       LIPID PANEL -   Lab Results   Component Value Date    CHOL 123 04/14/2023    TRIG 317 (H) 04/14/2023    HDL 30.9 (A) 04/14/2023    CHHDL 4.0 04/14/2023    LDLF 29  04/14/2023    VLDL 63 (H) 04/14/2023    NHDL 92 04/14/2023       RENAL FUNCTION PANEL -   Lab Results   Component Value Date    GLUCOSE 116 (H) 01/02/2024     01/02/2024    K 4.5 01/02/2024     01/02/2024    CO2 27 01/02/2024    ANIONGAP 12 01/02/2024    BUN 11 01/02/2024    CREATININE 1.14 (H) 01/02/2024    CALCIUM 8.9 01/02/2024    PHOS 3.8 01/02/2024    ALBUMIN 4.1 01/02/2024        Lab Results   Component Value Date    HGBA1C 5.7 05/06/2024           Lab review: I have personally reviewed the laboratory result(s)       Problem List Items Addressed This Visit       CAD (coronary artery disease)    Overview     10/8/19 DARIEN LAD  Has occasional atypical CP but no definite angina. 10/2023 stress test with good exercise tolerance with no ischemia/NL LVEF.  On DAPT/beta blocker/statin.Stop smoking.Follow.         Chronic obstructive pulmonary disease (Multi)    Overview     Mild emphysema changes per 1/2017 CT-stop smoking. No wheeze on exam.         Episodic lightheadedness    Overview     BP on low side-encourage po intake         Obstructive sleep apnea, adult    Overview     On  CPAP         Palpitations    Overview     11/2023 7 day EM unremarkable         Pure hypercholesterolemia    Overview     Pt on high intensity statin-1/2021  lipids with LDL=27..Follow heart healthy diet.  Was  instructed by renal to try and come off statin because of renal insuff-changed to Repatha  11/2022 CGOTI=493-SBS N/A(high TG)  Recheck         Tobacco abuse    Overview     Needs to stop smoking-discussed importance  She is cutting back-using nicotine patch intrermittently         High triglycerides - Primary    Overview     4/2022 [id number] Vascepa  11/2022 OE=959 on Vascepa, added fenofibrate  4/2023 SQ=034  Recheck              Lipids  Stay active  Stop smoking    Vinicius Swan, DO

## 2024-05-24 DIAGNOSIS — R42 EPISODIC LIGHTHEADEDNESS: ICD-10-CM

## 2024-05-27 RX ORDER — ICOSAPENT ETHYL 1 G/1
2 CAPSULE ORAL 2 TIMES DAILY
Qty: 360 CAPSULE | Refills: 3 | Status: SHIPPED | OUTPATIENT
Start: 2024-05-27 | End: 2025-05-22

## 2024-06-05 ENCOUNTER — TELEPHONE (OUTPATIENT)
Dept: CARDIOLOGY | Facility: CLINIC | Age: 49
End: 2024-06-05

## 2024-06-05 ENCOUNTER — LAB (OUTPATIENT)
Dept: LAB | Facility: LAB | Age: 49
End: 2024-06-05
Payer: COMMERCIAL

## 2024-06-05 DIAGNOSIS — E78.00 PURE HYPERCHOLESTEROLEMIA: ICD-10-CM

## 2024-06-05 DIAGNOSIS — E87.6 HYPOKALEMIA: ICD-10-CM

## 2024-06-05 DIAGNOSIS — N18.30 CHRONIC KIDNEY DISEASE, STAGE 3 UNSPECIFIED (MULTI): Primary | ICD-10-CM

## 2024-06-05 LAB
ALBUMIN SERPL BCP-MCNC: 3.9 G/DL (ref 3.4–5)
ANION GAP SERPL CALC-SCNC: 11 MMOL/L (ref 10–20)
APPEARANCE UR: CLEAR
BACTERIA #/AREA URNS AUTO: ABNORMAL /HPF
BILIRUB UR STRIP.AUTO-MCNC: NEGATIVE MG/DL
BUN SERPL-MCNC: 12 MG/DL (ref 6–23)
CALCIUM SERPL-MCNC: 9.1 MG/DL (ref 8.6–10.3)
CHLORIDE SERPL-SCNC: 103 MMOL/L (ref 98–107)
CHOLEST SERPL-MCNC: 311 MG/DL (ref 0–199)
CHOLESTEROL/HDL RATIO: 10.6
CO2 SERPL-SCNC: 30 MMOL/L (ref 21–32)
COLOR UR: COLORLESS
CREAT SERPL-MCNC: 1.19 MG/DL (ref 0.5–1.05)
CREAT UR-MCNC: 61.5 MG/DL (ref 20–320)
EGFRCR SERPLBLD CKD-EPI 2021: 56 ML/MIN/1.73M*2
GLUCOSE SERPL-MCNC: 99 MG/DL (ref 74–99)
GLUCOSE UR STRIP.AUTO-MCNC: NORMAL MG/DL
HDLC SERPL-MCNC: 29.4 MG/DL
KETONES UR STRIP.AUTO-MCNC: NEGATIVE MG/DL
LDLC SERPL CALC-MCNC: ABNORMAL MG/DL
LEUKOCYTE ESTERASE UR QL STRIP.AUTO: NEGATIVE
MAGNESIUM SERPL-MCNC: 2 MG/DL (ref 1.6–2.4)
MICROALBUMIN UR-MCNC: <7 MG/L
MICROALBUMIN/CREAT UR: NORMAL MG/G{CREAT}
MUCOUS THREADS #/AREA URNS AUTO: ABNORMAL /LPF
NITRITE UR QL STRIP.AUTO: NEGATIVE
NON HDL CHOLESTEROL: 282 MG/DL (ref 0–149)
PH UR STRIP.AUTO: 5.5 [PH]
PHOSPHATE SERPL-MCNC: 3.6 MG/DL (ref 2.5–4.9)
POTASSIUM SERPL-SCNC: 4.3 MMOL/L (ref 3.5–5.3)
PROT UR STRIP.AUTO-MCNC: NEGATIVE MG/DL
RBC # UR STRIP.AUTO: ABNORMAL /UL
RBC #/AREA URNS AUTO: ABNORMAL /HPF
SODIUM SERPL-SCNC: 140 MMOL/L (ref 136–145)
SP GR UR STRIP.AUTO: 1.01
TRIGL SERPL-MCNC: 637 MG/DL (ref 0–149)
UROBILINOGEN UR STRIP.AUTO-MCNC: NORMAL MG/DL
VLDL: ABNORMAL
WBC #/AREA URNS AUTO: ABNORMAL /HPF

## 2024-06-05 PROCEDURE — 82570 ASSAY OF URINE CREATININE: CPT

## 2024-06-05 PROCEDURE — 82043 UR ALBUMIN QUANTITATIVE: CPT

## 2024-06-05 PROCEDURE — 83735 ASSAY OF MAGNESIUM: CPT

## 2024-06-05 PROCEDURE — 81001 URINALYSIS AUTO W/SCOPE: CPT

## 2024-06-05 PROCEDURE — 80061 LIPID PANEL: CPT

## 2024-06-05 PROCEDURE — 80069 RENAL FUNCTION PANEL: CPT

## 2024-06-05 NOTE — TELEPHONE ENCOUNTER
----- Message from Vinicius Swan DO sent at 6/5/2024 10:09 AM EDT -----  Regarding: FW:  Is she talking her repatha  ----- Message -----  From: Lab, Background User  Sent: 6/5/2024  10:05 AM EDT  To: Vinicius Swan DO

## 2024-06-05 NOTE — TELEPHONE ENCOUNTER
Pt states she missed two doses of Repatha this past month d/t being in [place] for her father. Pt states she is resuming medication today.

## 2024-06-26 ENCOUNTER — APPOINTMENT (OUTPATIENT)
Dept: PRIMARY CARE | Facility: CLINIC | Age: 49
End: 2024-06-26
Payer: COMMERCIAL

## 2024-06-26 VITALS
TEMPERATURE: 97.3 F | BODY MASS INDEX: 26.16 KG/M2 | DIASTOLIC BLOOD PRESSURE: 74 MMHG | WEIGHT: 152.4 LBS | SYSTOLIC BLOOD PRESSURE: 130 MMHG | OXYGEN SATURATION: 95 % | HEART RATE: 97 BPM

## 2024-06-26 DIAGNOSIS — Z00.00 ANNUAL PHYSICAL EXAM: Primary | ICD-10-CM

## 2024-06-26 DIAGNOSIS — I20.9 ANGINA PECTORIS (CMS-HCC): ICD-10-CM

## 2024-06-26 DIAGNOSIS — E01.0 THYROMEGALY: ICD-10-CM

## 2024-06-26 DIAGNOSIS — N18.31 STAGE 3A CHRONIC KIDNEY DISEASE (MULTI): ICD-10-CM

## 2024-06-26 PROBLEM — Z87.448 HISTORY OF RENAL INSUFFICIENCY SYNDROME: Status: RESOLVED | Noted: 2023-08-29 | Resolved: 2024-06-26

## 2024-06-26 PROBLEM — F31.9 BIPOLAR DISORDER (MULTI): Status: ACTIVE | Noted: 2018-05-16

## 2024-06-26 PROBLEM — N18.30 CHRONIC KIDNEY DISEASE, STAGE III (MODERATE) (MULTI): Status: RESOLVED | Noted: 2023-08-29 | Resolved: 2024-06-26

## 2024-06-26 PROBLEM — N18.30 STAGE 3 CHRONIC KIDNEY DISEASE (MULTI): Status: ACTIVE | Noted: 2024-06-26

## 2024-06-26 PROBLEM — N19 RENAL FAILURE: Status: RESOLVED | Noted: 2023-08-29 | Resolved: 2024-06-26

## 2024-06-26 LAB
BASOPHILS # BLD AUTO: 0.07 X10*3/UL (ref 0–0.1)
BASOPHILS NFR BLD AUTO: 0.6 %
EOSINOPHIL # BLD AUTO: 0.13 X10*3/UL (ref 0–0.7)
EOSINOPHIL NFR BLD AUTO: 1.1 %
ERYTHROCYTE [DISTWIDTH] IN BLOOD BY AUTOMATED COUNT: 13.5 % (ref 11.5–14.5)
HCT VFR BLD AUTO: 44.7 % (ref 36–46)
HGB BLD-MCNC: 13.9 G/DL (ref 12–16)
IMM GRANULOCYTES # BLD AUTO: 0.04 X10*3/UL (ref 0–0.7)
IMM GRANULOCYTES NFR BLD AUTO: 0.4 % (ref 0–0.9)
LYMPHOCYTES # BLD AUTO: 4.84 X10*3/UL (ref 1.2–4.8)
LYMPHOCYTES NFR BLD AUTO: 42.6 %
MCH RBC QN AUTO: 31 PG (ref 26–34)
MCHC RBC AUTO-ENTMCNC: 31.1 G/DL (ref 32–36)
MCV RBC AUTO: 100 FL (ref 80–100)
MONOCYTES # BLD AUTO: 0.73 X10*3/UL (ref 0.1–1)
MONOCYTES NFR BLD AUTO: 6.4 %
NEUTROPHILS # BLD AUTO: 5.54 X10*3/UL (ref 1.2–7.7)
NEUTROPHILS NFR BLD AUTO: 48.9 %
NRBC BLD-RTO: 0 /100 WBCS (ref 0–0)
PLATELET # BLD AUTO: 387 X10*3/UL (ref 150–450)
RBC # BLD AUTO: 4.49 X10*6/UL (ref 4–5.2)
TSH SERPL-ACNC: 1.46 MIU/L (ref 0.44–3.98)
WBC # BLD AUTO: 11.4 X10*3/UL (ref 4.4–11.3)

## 2024-06-26 PROCEDURE — 99213 OFFICE O/P EST LOW 20 MIN: CPT | Performed by: FAMILY MEDICINE

## 2024-06-26 PROCEDURE — 84443 ASSAY THYROID STIM HORMONE: CPT

## 2024-06-26 PROCEDURE — 85025 COMPLETE CBC W/AUTO DIFF WBC: CPT

## 2024-06-26 PROCEDURE — 99396 PREV VISIT EST AGE 40-64: CPT | Performed by: FAMILY MEDICINE

## 2024-06-26 PROCEDURE — 36415 COLL VENOUS BLD VENIPUNCTURE: CPT

## 2024-06-26 ASSESSMENT — PAIN SCALES - GENERAL: PAINLEVEL: 6

## 2024-06-26 ASSESSMENT — ENCOUNTER SYMPTOMS: DEPRESSION: 0

## 2024-06-26 NOTE — PROGRESS NOTES
Subjective   Patient ID: Raegan Perez is a 48 y.o. female who presents for Annual Exam (Annual physical exam, pt is not fasting. ).    HPI   Patient here for annual checkup.  She has multiple medical problems.  Father in [facility], suffering with CHF and Renal issues.  Injured her toe.  Her  was walking by her and accidentally caught her toe with his foot.  Fifth toe on the right foot was bent back.  Review of Systems  Cardiovascular: no chest pain, no edema, no palpitations, and no syncope.   Respiratory: no cough,no shortness of breath, and no wheezing  Gastrointestinal: no abdominal pain, nausea, vomiting or blood in stools  Genitourinary: no dysuria or hematuria    Objective   /74 (BP Location: Left arm, Patient Position: Sitting, BP Cuff Size: Adult)   Pulse 97   Temp 36.3 °C (97.3 °F) (Temporal)   Wt 69.1 kg (152 lb 6.4 oz)   SpO2 95%   BMI 26.16 kg/m²     Physical Exam  Alert, pleasant and in no acute distress.  Neck: Supple, no JVD or carotid bruit.  Enlarged thyroid.  No significant adenopathy.  Heart: Regular rate and rhythm, no murmur  Lungs: Clear to auscultation  Lower extremities: No edema  Abd: nontender, no mass    Assessment/Plan   Problem List Items Addressed This Visit             ICD-10-CM    Angina pectoris (CMS-HCC) I20.9    Stage 3 chronic kidney disease (Multi) N18.30     Other Visit Diagnoses         Codes    Annual physical exam    -  Primary Z00.00    Thyromegaly     E01.0    Relevant Orders    US thyroid    CBC and Auto Differential    TSH with reflex to Free T4 if abnormal        1.  Health maintenance: Care gaps addressed.  2.  Thyromegaly: Thyroid ultrasound ordered.  Will also check TSH.  3.  CKD 3: Patient seen nephrology every 6 months  4.  Angina/CAD: Has been stable.  Follows with cardiology.  Will contact patient as test results come in.  Plan follow-up 6 to 12 months  Patient with probable fractured fifth toe.  She is comfortable with supportive shoe wear  and yonis taping.

## 2024-07-02 ENCOUNTER — APPOINTMENT (OUTPATIENT)
Dept: OTOLARYNGOLOGY | Facility: CLINIC | Age: 49
End: 2024-07-02
Payer: COMMERCIAL

## 2024-07-16 ENCOUNTER — HOSPITAL ENCOUNTER (OUTPATIENT)
Dept: RADIOLOGY | Facility: HOSPITAL | Age: 49
Discharge: HOME | End: 2024-07-16
Payer: COMMERCIAL

## 2024-07-16 DIAGNOSIS — E01.0 THYROMEGALY: ICD-10-CM

## 2024-07-16 PROCEDURE — 76536 US EXAM OF HEAD AND NECK: CPT

## 2024-07-16 PROCEDURE — 76536 US EXAM OF HEAD AND NECK: CPT | Performed by: RADIOLOGY

## 2024-07-31 ENCOUNTER — APPOINTMENT (OUTPATIENT)
Dept: CARDIOLOGY | Facility: HOSPITAL | Age: 49
DRG: 321 | End: 2024-07-31
Payer: COMMERCIAL

## 2024-07-31 ENCOUNTER — HOSPITAL ENCOUNTER (INPATIENT)
Facility: HOSPITAL | Age: 49
LOS: 1 days | Discharge: LEFT BEFORE DISCHARGE | DRG: 321 | End: 2024-07-31
Attending: EMERGENCY MEDICINE | Admitting: INTERNAL MEDICINE
Payer: COMMERCIAL

## 2024-07-31 ENCOUNTER — APPOINTMENT (OUTPATIENT)
Dept: RADIOLOGY | Facility: HOSPITAL | Age: 49
DRG: 321 | End: 2024-07-31
Payer: COMMERCIAL

## 2024-07-31 VITALS
SYSTOLIC BLOOD PRESSURE: 123 MMHG | HEIGHT: 64 IN | DIASTOLIC BLOOD PRESSURE: 75 MMHG | OXYGEN SATURATION: 97 % | TEMPERATURE: 98.6 F | WEIGHT: 154.1 LBS | HEART RATE: 92 BPM | BODY MASS INDEX: 26.31 KG/M2 | RESPIRATION RATE: 27 BRPM

## 2024-07-31 DIAGNOSIS — I21.02 ST ELEVATION (STEMI) MYOCARDIAL INFARCTION INVOLVING LEFT ANTERIOR DESCENDING CORONARY ARTERY (MULTI): ICD-10-CM

## 2024-07-31 DIAGNOSIS — I21.3 ST ELEVATION MYOCARDIAL INFARCTION (STEMI), UNSPECIFIED ARTERY (MULTI): Primary | ICD-10-CM

## 2024-07-31 DIAGNOSIS — I21.3 STEMI (ST ELEVATION MYOCARDIAL INFARCTION) (MULTI): ICD-10-CM

## 2024-07-31 LAB
ALBUMIN SERPL BCP-MCNC: 3.9 G/DL (ref 3.4–5)
ALP SERPL-CCNC: 83 U/L (ref 33–110)
ALT SERPL W P-5'-P-CCNC: 8 U/L (ref 7–45)
ANION GAP SERPL CALC-SCNC: 14 MMOL/L (ref 10–20)
AORTIC VALVE MEAN GRADIENT: 5 MMHG
AORTIC VALVE PEAK VELOCITY: 1.62 M/S
APTT PPP: 32 SECONDS (ref 27–38)
AST SERPL W P-5'-P-CCNC: 10 U/L (ref 9–39)
AV PEAK GRADIENT: 10.5 MMHG
AVA (PEAK VEL): 1.86 CM2
AVA (VTI): 2.16 CM2
BASOPHILS # BLD AUTO: 0.08 X10*3/UL (ref 0–0.1)
BASOPHILS NFR BLD AUTO: 0.5 %
BILIRUB SERPL-MCNC: 0.2 MG/DL (ref 0–1.2)
BUN SERPL-MCNC: 17 MG/DL (ref 6–23)
CALCIUM SERPL-MCNC: 9.4 MG/DL (ref 8.6–10.3)
CARDIAC TROPONIN I PNL SERPL HS: 153 NG/L (ref 0–13)
CARDIAC TROPONIN I PNL SERPL HS: 22 NG/L (ref 0–13)
CHLORIDE SERPL-SCNC: 103 MMOL/L (ref 98–107)
CO2 SERPL-SCNC: 26 MMOL/L (ref 21–32)
CREAT SERPL-MCNC: 1.39 MG/DL (ref 0.5–1.05)
EGFRCR SERPLBLD CKD-EPI 2021: 47 ML/MIN/1.73M*2
EJECTION FRACTION APICAL 4 CHAMBER: 61.8
EJECTION FRACTION: 64 %
EOSINOPHIL # BLD AUTO: 0.24 X10*3/UL (ref 0–0.7)
EOSINOPHIL NFR BLD AUTO: 1.4 %
ERYTHROCYTE [DISTWIDTH] IN BLOOD BY AUTOMATED COUNT: 13.2 % (ref 11.5–14.5)
GLUCOSE SERPL-MCNC: 124 MG/DL (ref 74–99)
HCT VFR BLD AUTO: 44.3 % (ref 36–46)
HGB BLD-MCNC: 14.8 G/DL (ref 12–16)
HOLD SPECIMEN: NORMAL
IMM GRANULOCYTES # BLD AUTO: 0.11 X10*3/UL (ref 0–0.7)
IMM GRANULOCYTES NFR BLD AUTO: 0.7 % (ref 0–0.9)
INR PPP: 1 (ref 0.9–1.1)
LEFT ATRIUM VOLUME AREA LENGTH INDEX BSA: 14.9 ML/M2
LEFT VENTRICLE INTERNAL DIMENSION DIASTOLE: 3.9 CM (ref 3.5–6)
LEFT VENTRICULAR OUTFLOW TRACT DIAMETER: 2 CM
LYMPHOCYTES # BLD AUTO: 7.4 X10*3/UL (ref 1.2–4.8)
LYMPHOCYTES NFR BLD AUTO: 44 %
MCH RBC QN AUTO: 32 PG (ref 26–34)
MCHC RBC AUTO-ENTMCNC: 33.4 G/DL (ref 32–36)
MCV RBC AUTO: 96 FL (ref 80–100)
MITRAL VALVE E/A RATIO: 1.13
MONOCYTES # BLD AUTO: 1.04 X10*3/UL (ref 0.1–1)
MONOCYTES NFR BLD AUTO: 6.2 %
NEUTROPHILS # BLD AUTO: 7.96 X10*3/UL (ref 1.2–7.7)
NEUTROPHILS NFR BLD AUTO: 47.2 %
NRBC BLD-RTO: 0 /100 WBCS (ref 0–0)
PLATELET # BLD AUTO: 400 X10*3/UL (ref 150–450)
POTASSIUM SERPL-SCNC: 3.8 MMOL/L (ref 3.5–5.3)
PROT SERPL-MCNC: 6.7 G/DL (ref 6.4–8.2)
PROTHROMBIN TIME: 11.7 SECONDS (ref 9.8–12.8)
RBC # BLD AUTO: 4.63 X10*6/UL (ref 4–5.2)
RIGHT VENTRICLE FREE WALL PEAK S': 15.1 CM/S
SODIUM SERPL-SCNC: 139 MMOL/L (ref 136–145)
TRICUSPID ANNULAR PLANE SYSTOLIC EXCURSION: 2.5 CM
WBC # BLD AUTO: 16.8 X10*3/UL (ref 4.4–11.3)

## 2024-07-31 PROCEDURE — 84075 ASSAY ALKALINE PHOSPHATASE: CPT | Performed by: PHYSICIAN ASSISTANT

## 2024-07-31 PROCEDURE — B2151ZZ FLUOROSCOPY OF LEFT HEART USING LOW OSMOLAR CONTRAST: ICD-10-PCS | Performed by: INTERNAL MEDICINE

## 2024-07-31 PROCEDURE — 36415 COLL VENOUS BLD VENIPUNCTURE: CPT | Performed by: PHYSICIAN ASSISTANT

## 2024-07-31 PROCEDURE — 2500000001 HC RX 250 WO HCPCS SELF ADMINISTERED DRUGS (ALT 637 FOR MEDICARE OP): Performed by: NURSE PRACTITIONER

## 2024-07-31 PROCEDURE — 2020000001 HC ICU ROOM DAILY

## 2024-07-31 PROCEDURE — 2500000004 HC RX 250 GENERAL PHARMACY W/ HCPCS (ALT 636 FOR OP/ED): Performed by: INTERNAL MEDICINE

## 2024-07-31 PROCEDURE — 93005 ELECTROCARDIOGRAM TRACING: CPT

## 2024-07-31 PROCEDURE — 2780000003 HC OR 278 NO HCPCS: Performed by: INTERNAL MEDICINE

## 2024-07-31 PROCEDURE — 2500000004 HC RX 250 GENERAL PHARMACY W/ HCPCS (ALT 636 FOR OP/ED): Performed by: NURSE PRACTITIONER

## 2024-07-31 PROCEDURE — 94640 AIRWAY INHALATION TREATMENT: CPT

## 2024-07-31 PROCEDURE — 99152 MOD SED SAME PHYS/QHP 5/>YRS: CPT | Performed by: INTERNAL MEDICINE

## 2024-07-31 PROCEDURE — C1887 CATHETER, GUIDING: HCPCS | Performed by: INTERNAL MEDICINE

## 2024-07-31 PROCEDURE — B2111ZZ FLUOROSCOPY OF MULTIPLE CORONARY ARTERIES USING LOW OSMOLAR CONTRAST: ICD-10-PCS | Performed by: INTERNAL MEDICINE

## 2024-07-31 PROCEDURE — 93306 TTE W/DOPPLER COMPLETE: CPT

## 2024-07-31 PROCEDURE — C9606 PERC D-E COR REVASC W AMI S: HCPCS | Mod: 59,LD | Performed by: INTERNAL MEDICINE

## 2024-07-31 PROCEDURE — 99153 MOD SED SAME PHYS/QHP EA: CPT | Performed by: INTERNAL MEDICINE

## 2024-07-31 PROCEDURE — 2500000005 HC RX 250 GENERAL PHARMACY W/O HCPCS: Performed by: INTERNAL MEDICINE

## 2024-07-31 PROCEDURE — 4A023N7 MEASUREMENT OF CARDIAC SAMPLING AND PRESSURE, LEFT HEART, PERCUTANEOUS APPROACH: ICD-10-PCS | Performed by: INTERNAL MEDICINE

## 2024-07-31 PROCEDURE — 93306 TTE W/DOPPLER COMPLETE: CPT | Performed by: INTERNAL MEDICINE

## 2024-07-31 PROCEDURE — 2550000001 HC RX 255 CONTRASTS: Performed by: INTERNAL MEDICINE

## 2024-07-31 PROCEDURE — 92941 PRQ TRLML REVSC TOT OCCL AMI: CPT | Performed by: INTERNAL MEDICINE

## 2024-07-31 PROCEDURE — 2500000002 HC RX 250 W HCPCS SELF ADMINISTERED DRUGS (ALT 637 FOR MEDICARE OP, ALT 636 FOR OP/ED): Performed by: NURSE PRACTITIONER

## 2024-07-31 PROCEDURE — C1769 GUIDE WIRE: HCPCS | Performed by: INTERNAL MEDICINE

## 2024-07-31 PROCEDURE — 93458 L HRT ARTERY/VENTRICLE ANGIO: CPT | Performed by: INTERNAL MEDICINE

## 2024-07-31 PROCEDURE — 027034Z DILATION OF CORONARY ARTERY, ONE ARTERY WITH DRUG-ELUTING INTRALUMINAL DEVICE, PERCUTANEOUS APPROACH: ICD-10-PCS | Performed by: INTERNAL MEDICINE

## 2024-07-31 PROCEDURE — 2500000002 HC RX 250 W HCPCS SELF ADMINISTERED DRUGS (ALT 637 FOR MEDICARE OP, ALT 636 FOR OP/ED): Performed by: PHYSICIAN ASSISTANT

## 2024-07-31 PROCEDURE — 85610 PROTHROMBIN TIME: CPT | Performed by: PHYSICIAN ASSISTANT

## 2024-07-31 PROCEDURE — 96373 THER/PROPH/DIAG INJ IA: CPT | Performed by: INTERNAL MEDICINE

## 2024-07-31 PROCEDURE — 2720000007 HC OR 272 NO HCPCS: Performed by: INTERNAL MEDICINE

## 2024-07-31 PROCEDURE — 36415 COLL VENOUS BLD VENIPUNCTURE: CPT | Performed by: NURSE PRACTITIONER

## 2024-07-31 PROCEDURE — 2500000001 HC RX 250 WO HCPCS SELF ADMINISTERED DRUGS (ALT 637 FOR MEDICARE OP): Performed by: PHYSICIAN ASSISTANT

## 2024-07-31 PROCEDURE — 2500000004 HC RX 250 GENERAL PHARMACY W/ HCPCS (ALT 636 FOR OP/ED): Performed by: PHYSICIAN ASSISTANT

## 2024-07-31 PROCEDURE — 84484 ASSAY OF TROPONIN QUANT: CPT | Performed by: PHYSICIAN ASSISTANT

## 2024-07-31 PROCEDURE — 96374 THER/PROPH/DIAG INJ IV PUSH: CPT | Mod: 59

## 2024-07-31 PROCEDURE — 85730 THROMBOPLASTIN TIME PARTIAL: CPT | Performed by: PHYSICIAN ASSISTANT

## 2024-07-31 PROCEDURE — 84484 ASSAY OF TROPONIN QUANT: CPT | Performed by: NURSE PRACTITIONER

## 2024-07-31 PROCEDURE — 99285 EMERGENCY DEPT VISIT HI MDM: CPT | Mod: 25

## 2024-07-31 PROCEDURE — 71045 X-RAY EXAM CHEST 1 VIEW: CPT | Performed by: RADIOLOGY

## 2024-07-31 PROCEDURE — C1894 INTRO/SHEATH, NON-LASER: HCPCS | Performed by: INTERNAL MEDICINE

## 2024-07-31 PROCEDURE — 93458 L HRT ARTERY/VENTRICLE ANGIO: CPT | Mod: 59 | Performed by: INTERNAL MEDICINE

## 2024-07-31 PROCEDURE — C1874 STENT, COATED/COV W/DEL SYS: HCPCS | Performed by: INTERNAL MEDICINE

## 2024-07-31 PROCEDURE — 71045 X-RAY EXAM CHEST 1 VIEW: CPT

## 2024-07-31 PROCEDURE — 85025 COMPLETE CBC W/AUTO DIFF WBC: CPT | Performed by: PHYSICIAN ASSISTANT

## 2024-07-31 PROCEDURE — C1760 CLOSURE DEV, VASC: HCPCS | Performed by: INTERNAL MEDICINE

## 2024-07-31 PROCEDURE — C1725 CATH, TRANSLUMIN NON-LASER: HCPCS | Performed by: INTERNAL MEDICINE

## 2024-07-31 PROCEDURE — 93010 ELECTROCARDIOGRAM REPORT: CPT | Performed by: STUDENT IN AN ORGANIZED HEALTH CARE EDUCATION/TRAINING PROGRAM

## 2024-07-31 DEVICE — STENT ONYXNG25012UX ONYX 2.50X12RX
Type: IMPLANTABLE DEVICE | Site: CORONARY | Status: FUNCTIONAL
Brand: ONYX FRONTIER™

## 2024-07-31 RX ORDER — ONDANSETRON HYDROCHLORIDE 2 MG/ML
4 INJECTION, SOLUTION INTRAVENOUS ONCE AS NEEDED
Status: COMPLETED | OUTPATIENT
Start: 2024-07-31 | End: 2024-07-31

## 2024-07-31 RX ORDER — NITROGLYCERIN 40 MG/100ML
INJECTION INTRAVENOUS AS NEEDED
Status: DISCONTINUED | OUTPATIENT
Start: 2024-07-31 | End: 2024-07-31 | Stop reason: HOSPADM

## 2024-07-31 RX ORDER — HEPARIN SODIUM 1000 [USP'U]/ML
INJECTION, SOLUTION INTRAVENOUS; SUBCUTANEOUS AS NEEDED
Status: DISCONTINUED | OUTPATIENT
Start: 2024-07-31 | End: 2024-07-31 | Stop reason: HOSPADM

## 2024-07-31 RX ORDER — AMILORIDE HYDROCHLORIDE 5 MG/1
5 TABLET ORAL DAILY
Status: DISCONTINUED | OUTPATIENT
Start: 2024-07-31 | End: 2024-07-31 | Stop reason: HOSPADM

## 2024-07-31 RX ORDER — METOPROLOL SUCCINATE 25 MG/1
12.5 TABLET, EXTENDED RELEASE ORAL DAILY
Status: DISCONTINUED | OUTPATIENT
Start: 2024-07-31 | End: 2024-07-31 | Stop reason: HOSPADM

## 2024-07-31 RX ORDER — FLUTICASONE FUROATE AND VILANTEROL 200; 25 UG/1; UG/1
1 POWDER RESPIRATORY (INHALATION)
Status: DISCONTINUED | OUTPATIENT
Start: 2024-07-31 | End: 2024-07-31 | Stop reason: HOSPADM

## 2024-07-31 RX ORDER — ASPIRIN 81 MG/1
81 TABLET ORAL DAILY
Status: DISCONTINUED | OUTPATIENT
Start: 2024-08-01 | End: 2024-07-31 | Stop reason: HOSPADM

## 2024-07-31 RX ORDER — FENOFIBRATE 160 MG/1
160 TABLET ORAL DAILY
Status: DISCONTINUED | OUTPATIENT
Start: 2024-07-31 | End: 2024-07-31 | Stop reason: HOSPADM

## 2024-07-31 RX ORDER — ICOSAPENT ETHYL 1 G/1
2 CAPSULE ORAL 2 TIMES DAILY
Status: DISCONTINUED | OUTPATIENT
Start: 2024-07-31 | End: 2024-07-31 | Stop reason: HOSPADM

## 2024-07-31 RX ORDER — BENZONATATE 100 MG/1
100 CAPSULE ORAL 3 TIMES DAILY PRN
Status: DISCONTINUED | OUTPATIENT
Start: 2024-07-31 | End: 2024-07-31 | Stop reason: HOSPADM

## 2024-07-31 RX ORDER — NAPROXEN SODIUM 220 MG/1
324 TABLET, FILM COATED ORAL ONCE
Status: COMPLETED | OUTPATIENT
Start: 2024-07-31 | End: 2024-07-31

## 2024-07-31 RX ORDER — IODIXANOL 320 MG/ML
INJECTION, SOLUTION INTRAVASCULAR AS NEEDED
Status: DISCONTINUED | OUTPATIENT
Start: 2024-07-31 | End: 2024-07-31 | Stop reason: HOSPADM

## 2024-07-31 RX ORDER — ACETAMINOPHEN 325 MG/1
650 TABLET ORAL EVERY 6 HOURS PRN
Status: DISCONTINUED | OUTPATIENT
Start: 2024-07-31 | End: 2024-07-31 | Stop reason: HOSPADM

## 2024-07-31 RX ORDER — CYCLOBENZAPRINE HCL 10 MG
5 TABLET ORAL 3 TIMES DAILY PRN
Status: DISCONTINUED | OUTPATIENT
Start: 2024-07-31 | End: 2024-07-31 | Stop reason: HOSPADM

## 2024-07-31 RX ORDER — NITROGLYCERIN 0.4 MG/1
0.4 TABLET SUBLINGUAL EVERY 5 MIN PRN
Status: DISCONTINUED | OUTPATIENT
Start: 2024-07-31 | End: 2024-07-31 | Stop reason: HOSPADM

## 2024-07-31 RX ORDER — VERAPAMIL HYDROCHLORIDE 2.5 MG/ML
INJECTION, SOLUTION INTRAVENOUS AS NEEDED
Status: DISCONTINUED | OUTPATIENT
Start: 2024-07-31 | End: 2024-07-31 | Stop reason: HOSPADM

## 2024-07-31 RX ORDER — LIDOCAINE HYDROCHLORIDE 20 MG/ML
INJECTION, SOLUTION INFILTRATION; PERINEURAL AS NEEDED
Status: DISCONTINUED | OUTPATIENT
Start: 2024-07-31 | End: 2024-07-31 | Stop reason: HOSPADM

## 2024-07-31 RX ORDER — SODIUM CHLORIDE 9 MG/ML
1 INJECTION, SOLUTION INTRAVENOUS CONTINUOUS
Status: ACTIVE | OUTPATIENT
Start: 2024-07-31 | End: 2024-07-31

## 2024-07-31 RX ORDER — ALBUTEROL SULFATE 90 UG/1
2 AEROSOL, METERED RESPIRATORY (INHALATION) EVERY 4 HOURS PRN
Status: DISCONTINUED | OUTPATIENT
Start: 2024-07-31 | End: 2024-07-31 | Stop reason: HOSPADM

## 2024-07-31 RX ORDER — CLONAZEPAM 0.5 MG/1
0.5 TABLET ORAL 2 TIMES DAILY
Status: DISCONTINUED | OUTPATIENT
Start: 2024-07-31 | End: 2024-07-31 | Stop reason: HOSPADM

## 2024-07-31 RX ORDER — IBUPROFEN 200 MG
1 TABLET ORAL DAILY
Status: DISCONTINUED | OUTPATIENT
Start: 2024-07-31 | End: 2024-07-31 | Stop reason: HOSPADM

## 2024-07-31 RX ORDER — CHOLECALCIFEROL (VITAMIN D3) 25 MCG
1000 TABLET ORAL DAILY
Status: DISCONTINUED | OUTPATIENT
Start: 2024-07-31 | End: 2024-07-31 | Stop reason: HOSPADM

## 2024-07-31 RX ORDER — METOPROLOL TARTRATE 25 MG/1
25 TABLET, FILM COATED ORAL 2 TIMES DAILY
Status: DISCONTINUED | OUTPATIENT
Start: 2024-07-31 | End: 2024-07-31

## 2024-07-31 RX ORDER — ATORVASTATIN CALCIUM 80 MG/1
80 TABLET, FILM COATED ORAL NIGHTLY
Status: DISCONTINUED | OUTPATIENT
Start: 2024-07-31 | End: 2024-07-31 | Stop reason: HOSPADM

## 2024-07-31 RX ORDER — FENOFIBRATE 48 MG/1
145 TABLET, FILM COATED ORAL DAILY
Status: DISCONTINUED | OUTPATIENT
Start: 2024-07-31 | End: 2024-07-31

## 2024-07-31 RX ORDER — AZELASTINE 1 MG/ML
1 SPRAY, METERED NASAL 2 TIMES DAILY
Status: DISCONTINUED | OUTPATIENT
Start: 2024-07-31 | End: 2024-07-31 | Stop reason: HOSPADM

## 2024-07-31 RX ORDER — HEPARIN SODIUM 5000 [USP'U]/ML
4000 INJECTION, SOLUTION INTRAVENOUS; SUBCUTANEOUS ONCE
Status: COMPLETED | OUTPATIENT
Start: 2024-07-31 | End: 2024-07-31

## 2024-07-31 RX ORDER — MIDAZOLAM HYDROCHLORIDE 1 MG/ML
INJECTION, SOLUTION INTRAMUSCULAR; INTRAVENOUS AS NEEDED
Status: DISCONTINUED | OUTPATIENT
Start: 2024-07-31 | End: 2024-07-31 | Stop reason: HOSPADM

## 2024-07-31 RX ORDER — CLONAZEPAM 0.5 MG/1
0.5 TABLET ORAL DAILY
Status: DISCONTINUED | OUTPATIENT
Start: 2024-07-31 | End: 2024-07-31

## 2024-07-31 RX ORDER — FLUTICASONE PROPIONATE 50 MCG
1 SPRAY, SUSPENSION (ML) NASAL 2 TIMES DAILY
Status: DISCONTINUED | OUTPATIENT
Start: 2024-07-31 | End: 2024-07-31 | Stop reason: HOSPADM

## 2024-07-31 RX ORDER — MORPHINE SULFATE 2 MG/ML
2 INJECTION, SOLUTION INTRAMUSCULAR; INTRAVENOUS EVERY 10 MIN PRN
Status: DISCONTINUED | OUTPATIENT
Start: 2024-07-31 | End: 2024-07-31 | Stop reason: HOSPADM

## 2024-07-31 RX ORDER — FENTANYL CITRATE 50 UG/ML
INJECTION, SOLUTION INTRAMUSCULAR; INTRAVENOUS AS NEEDED
Status: DISCONTINUED | OUTPATIENT
Start: 2024-07-31 | End: 2024-07-31 | Stop reason: HOSPADM

## 2024-07-31 SDOH — SOCIAL STABILITY: SOCIAL INSECURITY: WERE YOU ABLE TO COMPLETE ALL THE BEHAVIORAL HEALTH SCREENINGS?: YES

## 2024-07-31 SDOH — SOCIAL STABILITY: SOCIAL INSECURITY: ARE YOU OR HAVE YOU BEEN THREATENED OR ABUSED PHYSICALLY, EMOTIONALLY, OR SEXUALLY BY ANYONE?: NO

## 2024-07-31 SDOH — SOCIAL STABILITY: SOCIAL INSECURITY: HAVE YOU HAD ANY THOUGHTS OF HARMING ANYONE ELSE?: NO

## 2024-07-31 SDOH — SOCIAL STABILITY: SOCIAL INSECURITY: DOES ANYONE TRY TO KEEP YOU FROM HAVING/CONTACTING OTHER FRIENDS OR DOING THINGS OUTSIDE YOUR HOME?: NO

## 2024-07-31 SDOH — SOCIAL STABILITY: SOCIAL INSECURITY: DO YOU FEEL ANYONE HAS EXPLOITED OR TAKEN ADVANTAGE OF YOU FINANCIALLY OR OF YOUR PERSONAL PROPERTY?: NO

## 2024-07-31 SDOH — SOCIAL STABILITY: SOCIAL INSECURITY: HAVE YOU HAD THOUGHTS OF HARMING ANYONE ELSE?: NO

## 2024-07-31 SDOH — SOCIAL STABILITY: SOCIAL INSECURITY: ARE THERE ANY APPARENT SIGNS OF INJURIES/BEHAVIORS THAT COULD BE RELATED TO ABUSE/NEGLECT?: NO

## 2024-07-31 SDOH — SOCIAL STABILITY: SOCIAL INSECURITY: HAS ANYONE EVER THREATENED TO HURT YOUR FAMILY OR YOUR PETS?: NO

## 2024-07-31 SDOH — SOCIAL STABILITY: SOCIAL INSECURITY: ABUSE: ADULT

## 2024-07-31 SDOH — SOCIAL STABILITY: SOCIAL INSECURITY: DO YOU FEEL UNSAFE GOING BACK TO THE PLACE WHERE YOU ARE LIVING?: NO

## 2024-07-31 ASSESSMENT — LIFESTYLE VARIABLES
HOW OFTEN DO YOU HAVE 6 OR MORE DRINKS ON ONE OCCASION: NEVER
EVER FELT BAD OR GUILTY ABOUT YOUR DRINKING: NO
HOW OFTEN DO YOU HAVE A DRINK CONTAINING ALCOHOL: NEVER
HOW MANY STANDARD DRINKS CONTAINING ALCOHOL DO YOU HAVE ON A TYPICAL DAY: PATIENT DOES NOT DRINK
HAVE PEOPLE ANNOYED YOU BY CRITICIZING YOUR DRINKING: NO
HAVE YOU EVER FELT YOU SHOULD CUT DOWN ON YOUR DRINKING: NO
SKIP TO QUESTIONS 9-10: 1
EVER HAD A DRINK FIRST THING IN THE MORNING TO STEADY YOUR NERVES TO GET RID OF A HANGOVER: NO
TOTAL SCORE: 0
AUDIT-C TOTAL SCORE: 0
AUDIT-C TOTAL SCORE: 0

## 2024-07-31 ASSESSMENT — PAIN SCALES - GENERAL
PAINLEVEL_OUTOF10: 0 - NO PAIN
PAINLEVEL_OUTOF10: 0 - NO PAIN
PAINLEVEL_OUTOF10: 10 - WORST POSSIBLE PAIN
PAINLEVEL_OUTOF10: 0 - NO PAIN
PAINLEVEL_OUTOF10: 0 - NO PAIN

## 2024-07-31 ASSESSMENT — COGNITIVE AND FUNCTIONAL STATUS - GENERAL
MOBILITY SCORE: 20
TOILETING: A LITTLE
MOVING TO AND FROM BED TO CHAIR: A LITTLE
CLIMB 3 TO 5 STEPS WITH RAILING: A LITTLE
DAILY ACTIVITIY SCORE: 22
PATIENT BASELINE BEDBOUND: NO
WALKING IN HOSPITAL ROOM: A LITTLE
STANDING UP FROM CHAIR USING ARMS: A LITTLE
HELP NEEDED FOR BATHING: A LITTLE

## 2024-07-31 ASSESSMENT — ACTIVITIES OF DAILY LIVING (ADL)
LACK_OF_TRANSPORTATION: NO
HEARING - LEFT EAR: FUNCTIONAL
WALKS IN HOME: INDEPENDENT
ADEQUATE_TO_COMPLETE_ADL: YES
JUDGMENT_ADEQUATE_SAFELY_COMPLETE_DAILY_ACTIVITIES: YES
BATHING: INDEPENDENT
HEARING - RIGHT EAR: FUNCTIONAL
DRESSING YOURSELF: INDEPENDENT
FEEDING YOURSELF: INDEPENDENT
GROOMING: INDEPENDENT
TOILETING: INDEPENDENT
PATIENT'S MEMORY ADEQUATE TO SAFELY COMPLETE DAILY ACTIVITIES?: YES

## 2024-07-31 ASSESSMENT — PAIN - FUNCTIONAL ASSESSMENT
PAIN_FUNCTIONAL_ASSESSMENT: 0-10

## 2024-07-31 ASSESSMENT — PAIN DESCRIPTION - DESCRIPTORS: DESCRIPTORS: DISCOMFORT;BURNING;STABBING

## 2024-07-31 ASSESSMENT — COLUMBIA-SUICIDE SEVERITY RATING SCALE - C-SSRS
2. HAVE YOU ACTUALLY HAD ANY THOUGHTS OF KILLING YOURSELF?: NO
6. HAVE YOU EVER DONE ANYTHING, STARTED TO DO ANYTHING, OR PREPARED TO DO ANYTHING TO END YOUR LIFE?: NO
1. IN THE PAST MONTH, HAVE YOU WISHED YOU WERE DEAD OR WISHED YOU COULD GO TO SLEEP AND NOT WAKE UP?: NO

## 2024-07-31 ASSESSMENT — PATIENT HEALTH QUESTIONNAIRE - PHQ9
2. FEELING DOWN, DEPRESSED OR HOPELESS: SEVERAL DAYS
SUM OF ALL RESPONSES TO PHQ9 QUESTIONS 1 & 2: 1
1. LITTLE INTEREST OR PLEASURE IN DOING THINGS: NOT AT ALL

## 2024-07-31 NOTE — ED TRIAGE NOTES
Pt states woken  at 0545 with cp and sob. Mid sternal radiates tp left side. Pt has hx of stent in 2020

## 2024-07-31 NOTE — H&P
History Of Present Illness:    Raegan Perez is a 48 y.o. female presenting with CP.    Patient is a 48-year-old female with a history of CAD, prior stent implantation, dyslipidemia, COPD, current cigarette smoker who presents with substernal chest discomfort and shortness of breath.  Patient states her chest discomfort woke her from sleep around 6 AM this morning.  She describes it as a midsternal to left-sided chest discomfort with associated shortness of breath.  She does admit to nausea.  She describes any palpitations, syncope, orthopnea, PND, lower extremity edema.  Compared to her previous episodes, this discomfort is much worse.  Character similar.    Twelve-lead ECG demonstrates inferior ST elevations.  Troponin is 22.  Creatinine is 1.39.  10/20/2023 MPI: Normal perfusion, ejection fraction 72%.     Last Recorded Vitals:  Vitals:    07/31/24 0622 07/31/24 0627 07/31/24 0640 07/31/24 0642   BP: 155/86 131/81 138/89    Pulse: 92 85 82    Resp: (!) 22 18 16    Temp:       SpO2: 96% 98% 96% 95%   Weight:       Height:           Last Labs:  CBC - 7/31/2024:  6:19 AM  16.8 14.8 400    44.3      CMP - 7/31/2024:  6:19 AM  9.4 6.7 10 --- 0.2   3.6 3.9 8 83      PTT - 7/31/2024:  6:19 AM  1.0   11.7 32     Troponin I, High Sensitivity   Date/Time Value Ref Range Status   07/31/2024 06:19 AM 22 (H) 0 - 13 ng/L Final     Troponin I   Date/Time Value Ref Range Status   04/28/2022 04:14 PM 4 0 - 13 ng/L Final     Comment:     .  Less than 99th percentile of normal range cutoff-  Female and children under 18 years old <14 ng/L; Male <21 ng/L: Negative  Repeat testing should be performed if clinically indicated.   .  Female and children under 18 years old 14-50 ng/L; Male 21-50 ng/L:  Consistent with possible cardiac damage and possible increased clinical   risk. Serial measurements may help to assess extent of myocardial damage.   .  >50 ng/L: Consistent with cardiac damage, increased clinical risk  "and  myocardial infarction. Serial measurements may help assess extent of   myocardial damage.   .   NOTE: Children less than 1 year old may have higher baseline troponin   levels and results should be interpreted in conjunction with the overall   clinical context.   .  NOTE: Troponin I testing is performed using a different   testing methodology at [facility] than at other   [facility] hospitals. Direct result comparisons should only   be made within the same method.       POCT Hemoglobin A1C   Date/Time Value Ref Range Status   05/06/2024 04:41 PM 5.7 4.2 - 6.5 % Final     LDL Calculated   Date/Time Value Ref Range Status   06/05/2024 07:20 AM   Final     Comment:     The calculation of LDL and VLDL are inaccurate when the Triglycerides are greater than 400 mg/dL or when the patient is non-fasting. If LDL measurement is necessary contact the testing laboratory for an alternative LDL assay.                                  Near   Borderline      AGE      Desirable  Optimal    High     High     Very High     0-19 Y     0 - 109     ---    110-129   >/= 130     ----    20-24 Y     0 - 119     ---    120-159   >/= 160     ----      >24 Y     0 -  99   100-129  130-159   160-189     >/=190       VLDL   Date/Time Value Ref Range Status   06/05/2024 07:20 AM   Final     Comment:     Unable to calculate VLDL.   04/14/2023 06:52 AM 63 (H) 0 - 40 mg/dL Final   11/09/2022 06:39 AM SEE COMMENT 0 - 40 mg/dL Final     Comment:       Unable to calculate VLDL.   04/25/2022 09:33 AM 78 (H) 0 - 40 mg/dL Final      Last I/O:  No intake/output data recorded.    Past Cardiology Tests (Last 3 Years):  EKG:  ECG 12 Lead 10/05/2023    Echo:  No results found for this or any previous visit from the past 1095 days.    Ejection Fractions:  No results found for: \"EF\"  Cath:  No results found for this or any previous visit from the past 1095 days.    Stress Test:  Nuclear Stress Test 10/20/2023      Nuclear Stress Test " 10/08/2021    Cardiac Imaging:  No results found for this or any previous visit from the past 1095 days.      Past Medical History:  She has a past medical history of Arteritis, unspecified (CMS-HCC) (09/22/2016), Cafe au lait spots (10/27/2017), Candidiasis, unspecified (07/07/2020), Disorder of breast, unspecified (06/16/2020), Disorder of the skin and subcutaneous tissue, unspecified (11/28/2017), Disorder of the skin and subcutaneous tissue, unspecified (11/28/2017), Encounter for gynecological examination (general) (routine) with abnormal findings (01/12/2017), Encounter for gynecological examination (general) (routine) without abnormal findings (08/04/2020), Encounter for gynecological examination (general) (routine) without abnormal findings (06/25/2019), Encounter for other preprocedural examination (03/19/2018), Encounter for screening mammogram for malignant neoplasm of breast (08/04/2020), Encounter for screening mammogram for malignant neoplasm of breast (06/25/2019), Left lower quadrant abdominal tenderness (05/07/2020), Menopausal and female climacteric states (10/27/2017), Menopausal and female climacteric states (09/15/2016), Menopausal and female climacteric states (01/11/2017), Noninfective gastroenteritis and colitis, unspecified (09/22/2016), Other chest pain (10/30/2019), Other conditions influencing health status (03/13/2020), Other skin changes (06/16/2020), Other specified noninflammatory disorders of vulva and perineum (07/20/2020), Pelvic and perineal pain, Pelvic and perineal pain (12/20/2016), Personal history of diseases of the blood and blood-forming organs and certain disorders involving the immune mechanism (09/22/2016), Personal history of nicotine dependence (03/19/2018), Personal history of other diseases of the circulatory system (12/16/2016), Personal history of other diseases of the digestive system (08/23/2021), Personal history of other diseases of the female genital tract,  Personal history of other diseases of the female genital tract (10/27/2017), Personal history of other diseases of the female genital tract (12/20/2016), Personal history of other diseases of the female genital tract (01/11/2017), Personal history of other diseases of the female genital tract (09/20/2017), Personal history of other diseases of the respiratory system, Personal history of other diseases of the respiratory system (09/26/2019), Personal history of other diseases of urinary system, Personal history of other endocrine, nutritional and metabolic disease, Personal history of other medical treatment (01/11/2017), Personal history of other specified conditions (01/26/2017), Personal history of other specified conditions (03/04/2019), Personal history of other specified conditions, Personal history of other specified conditions (09/22/2016), Personal history of other specified conditions (12/09/2019), Unspecified sprain of unspecified finger, initial encounter (09/26/2019), Unspecified symptoms and signs involving the genitourinary system, and Urinary tract infection, site not specified (07/10/2020).    Past Surgical History:  She has a past surgical history that includes Other surgical history (09/15/2016); Other surgical history (09/24/2021); Cervical biopsy w/ loop electrode excision (01/11/2017); Other surgical history (03/04/2019); Other surgical history (03/04/2019); Other surgical history (01/11/2017); Other surgical history (09/24/2021); Other surgical history (09/24/2021); MR angio neck wo IV contrast (3/26/2019); and MR angio head wo IV contrast (3/26/2019).      Social History:  She reports that she has been smoking cigarettes. She has never used smokeless tobacco. She reports that she does not drink alcohol and does not use drugs.    Family History:  Family History   Problem Relation Name Age of Onset    Breast cancer Mother      Coronary artery disease Father      Other (cardiac disorder) Father       Stroke Father      Hypertension Father      Memory loss Father      Skin cancer Father      Other (cardiac disorder) Sister      Hypertension Sister      Ovarian cancer Sister      Seizures Daughter      Skin cancer Daughter          Allergies:  Adhesive tape-silicones, Cat dander, and Cephalexin    Inpatient Medications:  Scheduled medications   Medication Dose Route Frequency    [START ON 8/1/2024] aspirin  81 mg oral Daily     PRN medications   Medication    oxygen     Continuous Medications   Medication Dose Last Rate    sodium chloride 0.9%  1 mL/kg/hr       Outpatient Medications:  Current Outpatient Medications   Medication Instructions    Advair -21 mcg/actuation inhaler     aMILoride (Midamor) 5 mg tablet 1 tablet, oral, Daily    aspirin 81 mg EC tablet 1 tablet, oral, Daily    azelastine (Astelin) 137 mcg (0.1 %) nasal spray 1 spray, nasal, 2 times daily    benzonatate (Tessalon) 100 mg capsule oral    benzoyl peroxide (Benzac AC) 10 % external wash 1 Application, Topical, Daily    cholecalciferol (VITAMIN D-3) 25 mcg, oral, Daily    clindamycin (Cleocin T) 1 % lotion Topical, 2 times daily    clobetasol (Temovate) 0.05 % ointment APPLY 1 APPLICATION 2 TIMES PER DAY AS NEEDED FOR 14 DAYS    clonazePAM (KlonoPIN) 1 mg tablet 0.5 tablets, oral, Daily    clopidogrel (PLAVIX) 75 mg, oral, Daily    cyclobenzaprine (Flexeril) 5 mg tablet 1 tablet, oral, 3 times daily PRN    fenofibrate (Tricor) 145 mg tablet 1 tablet, oral, Daily    icosapent ethyL (VASCEPA) 2 g, oral, 2 times daily    lamoTRIgine (LaMICtal) 100 mg tablet 1 tablet, oral, Daily    metoprolol succinate XL (TOPROL-XL) 12.5 mg, oral, Daily    nicotine (Nicoderm CQ) 14 mg/24 hr patch     nystatin (Mycostatin) 100,000 unit/mL suspension 5 mL, oral, 4 times daily    potassium chloride CR 10 mEq ER tablet oral, TAKE 1 TABLET BY MOUTH ONCE DAILY WITH FOOD FOR 90 DAYS    Repatha SureClick 140 mg, subcutaneous, Every 14 days    Spiriva Respimat  2.5 mcg/actuation inhaler 2 puffs, inhalation, Daily    Ventolin HFA 90 mcg/actuation inhaler 2 puffs, inhalation, Every 4 hours PRN    Xhance 93 mcg/actuation aerosol breath activated  Use 1 puff twice daily to bilateral sides       Physical Exam:  Physical Exam  Vitals reviewed.   Constitutional:       Appearance: Normal appearance.   HENT:      Head: Normocephalic and atraumatic.      Mouth/Throat:      Mouth: Mucous membranes are moist.      Pharynx: Oropharynx is clear.   Eyes:      Extraocular Movements: Extraocular movements intact.      Conjunctiva/sclera: Conjunctivae normal.   Cardiovascular:      Rate and Rhythm: Normal rate and regular rhythm.      Pulses: Normal pulses.      Heart sounds: Normal heart sounds.   Pulmonary:      Effort: Pulmonary effort is normal.      Breath sounds: Normal breath sounds.   Abdominal:      General: Bowel sounds are normal.      Palpations: Abdomen is soft.   Musculoskeletal:         General: No swelling.      Cervical back: Neck supple.   Skin:     General: Skin is warm and dry.   Neurological:      General: No focal deficit present.      Mental Status: She is alert.   Psychiatric:         Mood and Affect: Mood normal.         Behavior: Behavior normal.           Assessment/Plan   7/31/2024  1.  Inferior STEMI: Cardiac catheterization revealed mild RCA and circumflex artery disease.  Patent LAD stent with a 95% diagonal artery stenosis.  She underwent drug-eluting stenting of this vessel.  Her symptoms are improving.  EDP was 19.  2.  CAD: Continue DAPT and PCSK9 inhibitor.  Continue beta-blockade.  Echocardiogram is pending.  3.  Dyslipidemia: Continue Vascepa and Repatha.  4.  Further recommendations to follow    Peripheral IV 07/31/24 18 G Left Antecubital (Active)   Site Assessment Clean;Dry;Intact 07/31/24 0619   Dressing Type Transparent 07/31/24 0619   Line Status Flushed 07/31/24 0619   Dressing Status Clean;Dry;Occlusive 07/31/24 0619   Number of days: 0        Code Status:  No Order    Rl Alberto MD

## 2024-07-31 NOTE — CARE PLAN
Problem: Pain - Adult  Goal: Verbalizes/displays adequate comfort level or baseline comfort level  Outcome: Progressing     Problem: Safety - Adult  Goal: Free from fall injury  Outcome: Progressing     Problem: Discharge Planning  Goal: Discharge to home or other facility with appropriate resources  Outcome: Progressing     Problem: Chronic Conditions and Co-morbidities  Goal: Patient's chronic conditions and co-morbidity symptoms are monitored and maintained or improved  Outcome: Progressing     Problem: Fall/Injury  Goal: Not fall by end of shift  Outcome: Progressing  Goal: Be free from injury by end of the shift  Outcome: Progressing  Goal: Verbalize understanding of personal risk factors for fall in the hospital  Outcome: Progressing  Goal: Verbalize understanding of risk factor reduction measures to prevent injury from fall in the home  Outcome: Progressing  Goal: Pace activities to prevent fatigue by end of the shift  Outcome: Progressing     Problem: Pain  Goal: Takes deep breaths with improved pain control throughout the shift  Outcome: Progressing  Goal: Turns in bed with improved pain control throughout the shift  Outcome: Progressing  Goal: Walks with improved pain control throughout the shift  Outcome: Progressing  Goal: Performs ADL's with improved pain control throughout shift  Outcome: Progressing  Goal: Participates in PT with improved pain control throughout the shift  Outcome: Progressing  Goal: Free from opioid side effects throughout the shift  Outcome: Progressing  Goal: Free from acute confusion related to pain meds throughout the shift  Outcome: Progressing     Problem: Nutrition  Goal: Less than 5 days NPO/clear liquids  Outcome: Progressing  Goal: Oral intake greater than 50%  Outcome: Progressing  Goal: Oral intake greater 75%  Outcome: Progressing  Goal: Consume prescribed supplement  Outcome: Progressing  Goal: Adequate PO fluid intake  Outcome: Progressing  Goal: Nutrition support  goals are met within 48 hrs  Outcome: Progressing  Goal: Nutrition support is meeting 75% of nutrient needs  Outcome: Progressing  Goal: BG  mg/dL  Outcome: Progressing  Goal: Lab values WNL  Outcome: Progressing  Goal: Electrolytes WNL  Outcome: Progressing  Goal: Promote healing  Outcome: Progressing  Goal: Maintain stable weight  Outcome: Progressing     Problem: ACS/CP/NSTEMI/STEMI  Goal: Chest pain managed (free from pain or at acceptable level)  Outcome: Progressing  Goal: Lab values return to normal range  Outcome: Progressing  Goal: Promote self management  Outcome: Progressing  Goal: Serial ECG will return to baseline  Outcome: Progressing  Goal: Verbalize understanding of procedures/devices  Outcome: Progressing     Problem: Cardiac catheterization  Goal: Free from dysrhythmias  Outcome: Progressing  Goal: Free from pain  Outcome: Progressing  Goal: No evidence of post procedure complications  Outcome: Progressing  Goal: Promote self management  Outcome: Progressing  Goal: Verbalize understanding of procedure  Outcome: Progressing   The patient's goals for the shift include      The clinical goals for the shift include pt to have decreased chest pain

## 2024-07-31 NOTE — Clinical Note
Inflation 1: Pressure = 16 montrell; Duration = 15 sec. Inflation 2: Pressure = 22 montrell; Duration = 15 sec.

## 2024-07-31 NOTE — ED PROVIDER NOTES
Limitations to History: None  External Records Reviewed  Independent Historians: None  Social determinants affecting care: None    HPI  Raegan Perez is a 48 y.o. female with history of coronary artery disease, dyslipidemia, COPD, who presents emergency department for assessment of chest pain that woke her from sleep.  She reports its located midsternally and radiates up into the left side of her chest.  It feels like a tightness and sharpness.  She reports associated shortness of breath.  She denies pain rating to her arm or her back.  She denies any sweats.  She reports that she feels nervous and she is nauseated.  She does have history of 1 stent in her LAD.  She is a daily smoker.  She denies any tearing or ripping chest pains.    [facility]  Past Medical History:   Diagnosis Date    Arteritis, unspecified (CMS-[facility]) 09/22/2016    Infective aortitis    Cafe au lait spots 10/27/2017    Spot, cafe-au-lait    Candidiasis, unspecified 07/07/2020    Yeast infection    Disorder of breast, unspecified 06/16/2020    Breast lesion    Disorder of the skin and subcutaneous tissue, unspecified 11/28/2017    Skin abnormality    Disorder of the skin and subcutaneous tissue, unspecified 11/28/2017    Skin lesion    Encounter for gynecological examination (general) (routine) with abnormal findings 01/12/2017    Encounter for gynecological examination with abnormal finding    Encounter for gynecological examination (general) (routine) without abnormal findings 08/04/2020    Encounter for gynecological examination without abnormal finding    Encounter for gynecological examination (general) (routine) without abnormal findings 06/25/2019    Encounter for gynecological examination without abnormal finding    Encounter for other preprocedural examination 03/19/2018    Pre-op evaluation    Encounter for screening mammogram for malignant neoplasm of breast 08/04/2020    Other screening mammogram    Encounter for screening mammogram  for malignant neoplasm of breast 06/25/2019    Other screening mammogram    Left lower quadrant abdominal tenderness 05/07/2020    Abdominal left lower quadrant tenderness    Menopausal and female climacteric states 10/27/2017    Menopausal vaginal dryness    Menopausal and female climacteric states 09/15/2016    Menopausal syndrome (hot flashes)    Menopausal and female climacteric states 01/11/2017    Menopausal symptom    Noninfective gastroenteritis and colitis, unspecified 09/22/2016    Acute colitis    Other chest pain 10/30/2019    Anterior chest wall pain    Other conditions influencing health status 03/13/2020    History of cough    Other skin changes 06/16/2020    Vesicular rash    Other specified noninflammatory disorders of vulva and perineum 07/20/2020    Vulvar irritation    Pelvic and perineal pain     Pelvic pain    Pelvic and perineal pain 12/20/2016    Pelvic pain in female    Personal history of diseases of the blood and blood-forming organs and certain disorders involving the immune mechanism 09/22/2016    History of leukocytosis    Personal history of nicotine dependence 03/19/2018    History of tobacco abuse    Personal history of other diseases of the circulatory system 12/16/2016    History of lymphadenitis    Personal history of other diseases of the digestive system 08/23/2021    History of gastroesophageal reflux (GERD)    Personal history of other diseases of the female genital tract     History of abnormal cervical Pap smear    Personal history of other diseases of the female genital tract 10/27/2017    History of dyspareunia in female    Personal history of other diseases of the female genital tract 12/20/2016    History of vaginal discharge    Personal history of other diseases of the female genital tract 01/11/2017    History of amenorrhea    Personal history of other diseases of the female genital tract 09/20/2017    History of postmenopausal bleeding    Personal history of other  diseases of the respiratory system     History of asthma    Personal history of other diseases of the respiratory system 09/26/2019    History of acute sinusitis    Personal history of other diseases of urinary system     History of kidney disease    Personal history of other endocrine, nutritional and metabolic disease     History of hyperlipidemia    Personal history of other medical treatment 01/11/2017    History of screening mammography    Personal history of other specified conditions 01/26/2017    History of headache    Personal history of other specified conditions 03/04/2019    History of loss of consciousness    Personal history of other specified conditions     History of abnormal Pap smear    Personal history of other specified conditions 09/22/2016    History of abdominal pain    Personal history of other specified conditions 12/09/2019    History of syncope    Unspecified sprain of unspecified finger, initial encounter 09/26/2019    Sprain of finger of right hand    Unspecified symptoms and signs involving the genitourinary system     UTI symptoms    Urinary tract infection, site not specified 07/10/2020    Acute UTI    reviewed by myself.    Meds  Current Outpatient Medications   Medication Instructions    Advair -21 mcg/actuation inhaler     aMILoride (Midamor) 5 mg tablet 1 tablet, oral, Daily    aspirin 81 mg EC tablet 1 tablet, oral, Daily    azelastine (Astelin) 137 mcg (0.1 %) nasal spray 1 spray, nasal, 2 times daily    benzonatate (Tessalon) 100 mg capsule oral    benzoyl peroxide (Benzac AC) 10 % external wash 1 Application, Topical, Daily    cholecalciferol (VITAMIN D-3) 25 mcg, oral, Daily    clindamycin (Cleocin T) 1 % lotion Topical, 2 times daily    clobetasol (Temovate) 0.05 % ointment APPLY 1 APPLICATION 2 TIMES PER DAY AS NEEDED FOR 14 DAYS    clonazePAM (KlonoPIN) 1 mg tablet 0.5 tablets, oral, Daily    clopidogrel (PLAVIX) 75 mg, oral, Daily    cyclobenzaprine (Flexeril) 5 mg  "tablet 1 tablet, oral, 3 times daily PRN    fenofibrate (Tricor) 145 mg tablet 1 tablet, oral, Daily    icosapent ethyL (VASCEPA) 2 g, oral, 2 times daily    lamoTRIgine (LaMICtal) 100 mg tablet 1 tablet, oral, Daily    metoprolol succinate XL (TOPROL-XL) 12.5 mg, oral, Daily    nicotine (Nicoderm CQ) 14 mg/24 hr patch     nystatin (Mycostatin) 100,000 unit/mL suspension 5 mL, oral, 4 times daily    potassium chloride CR 10 mEq ER tablet oral, TAKE 1 TABLET BY MOUTH ONCE DAILY WITH FOOD FOR 90 DAYS    Repatha SureClick 140 mg, subcutaneous, Every 14 days    Spiriva Respimat 2.5 mcg/actuation inhaler 2 puffs, inhalation, Daily    Ventolin HFA 90 mcg/actuation inhaler 2 puffs, inhalation, Every 4 hours PRN    Xhance 93 mcg/actuation aerosol breath activated  Use 1 puff twice daily to bilateral sides       Allergies  Allergies   Allergen Reactions    Adhesive Tape-Silicones Rash     Use paper tape    Cat Dander Unknown    Cephalexin Unknown           reviewed by myself.    SHx  Social History     Tobacco Use    Smoking status: Every Day     Types: Cigarettes    Smokeless tobacco: Never   Vaping Use    Vaping status: Never Used   Substance Use Topics    Alcohol use: Never    Drug use: Never    reviewed by myself.      ------------------------------------------------------------------------------------------------------------------------------------------    /81   Pulse 85   Temp 36.6 °C (97.9 °F)   Resp 18   Ht 1.6 m (5' 2.99\")   Wt 67.6 kg (149 lb)   SpO2 98%   BMI 26.40 kg/m²     Physical Exam  Vitals and nursing note reviewed.   Constitutional:       General: She is not in acute distress.     Appearance: Normal appearance. She is well-developed and normal weight. She is not ill-appearing or toxic-appearing.   HENT:      Head: Normocephalic.      Nose: Nose normal.      Mouth/Throat:      Mouth: Mucous membranes are moist.      Pharynx: Oropharynx is clear.   Eyes:      Extraocular Movements: Extraocular " movements intact.      Conjunctiva/sclera: Conjunctivae normal.   Cardiovascular:      Rate and Rhythm: Normal rate and regular rhythm.      Pulses:           Radial pulses are 2+ on the right side and 2+ on the left side.   Pulmonary:      Effort: Pulmonary effort is normal.      Breath sounds: Normal breath sounds.   Abdominal:      General: Abdomen is flat.      Palpations: Abdomen is soft.      Tenderness: There is no abdominal tenderness.   Musculoskeletal:         General: Normal range of motion.      Cervical back: Neck supple.      Right lower leg: No edema.      Left lower leg: No edema.   Skin:     General: Skin is warm and dry.   Neurological:      General: No focal deficit present.      Mental Status: She is alert and oriented to person, place, and time.   Psychiatric:         Attention and Perception: Attention normal.         Mood and Affect: Mood normal.          ------------------------------------------------------------------------------------------------------------------------------------------  Labs  Labs Reviewed   CBC WITH AUTO DIFFERENTIAL - Abnormal       Result Value    WBC 16.8 (*)     nRBC 0.0      RBC 4.63      Hemoglobin 14.8      Hematocrit 44.3      MCV 96      MCH 32.0      MCHC 33.4      RDW 13.2      Platelets 400      Neutrophils % 47.2      Immature Granulocytes %, Automated 0.7      Lymphocytes % 44.0      Monocytes % 6.2      Eosinophils % 1.4      Basophils % 0.5      Neutrophils Absolute 7.96 (*)     Immature Granulocytes Absolute, Automated 0.11      Lymphocytes Absolute 7.40 (*)     Monocytes Absolute 1.04 (*)     Eosinophils Absolute 0.24      Basophils Absolute 0.08     PROTIME-INR - Normal    Protime 11.7      INR 1.0     APTT - Normal    aPTT 32      Narrative:     The APTT is no longer used for monitoring Unfractionated Heparin Therapy. For monitoring Heparin Therapy, use the Heparin Assay.   COMPREHENSIVE METABOLIC PANEL   TROPONIN I, HIGH SENSITIVITY         Imaging  Cardiac Catheterization Procedure    (Results Pending)   XR chest 1 view    (Results Pending)        ED Course  ED Course as of 07/31/24 0632   Wed Jul 31, 2024   0616 Pt seen with VIKRAM - this is a 48yF HTN DLD CAD with stent to LAD COPD daily smoker who presents with chest tightness on waking up this morning.  No fever.  EKG interpreted by me with new inferior NELLY (and small Q waves) and reciprocal anterior STD consistent with STEMI.  STEMI activated [EK]   0619 Spoke to Dr. Alberto who recommends aspirin, brilinta, heparin with plan for emergent cath [EK]      ED Course User Index  [EK] Elyse H Klerman, MD         Diagnoses as of 07/31/24 0632   ST elevation myocardial infarction (STEMI), unspecified artery (Multi)        Medical Decision Making: She did not appear ill or toxic.  She does appear anxious.  Vital signs reviewed and stable.  She was placed on a continuous cardiac and pulse ox monitor.    Differential diagnoses considered: STEMI, CAD,    Medications given: oral aspirin, oral Brilinta, IV heparin    EKG interpreted by myself and ED attending: Normal sinus rhythm. Ventricular rate 84 bpm. ST elevations in the inferior leads with depressions in V1 and V2. ST elevations in V5 and V6 as well.  His changes are new from previous EKG.    Interventional cardiology contacted immediately after the EKG was obtained.  I spoke with Dr. Zapata will take the patient to the catheterization lab.  Patient updated and agreeable to plan of care.  Case discussed and evaluated with ED attending who is agreeable to patient plan of care.    Diagnosis: STEMI  Plan: Admit     Santi Hoyos PA-C  07/31/24 0632

## 2024-07-31 NOTE — PROGRESS NOTES
07/31/24 1729   Discharge Planning   Living Arrangements Spouse/significant other;Children   Support Systems Spouse/significant other;Children;Family members   Assistance Needed independent and driving PTA.   Type of Residence Private residence   Number of Stairs to Enter Residence 3   Number of Stairs Within Residence 26  (First floor living accomodations)   Do you have animals or pets at home? Yes   Type of Animals or Pets 3 dogs (1 service, 1 therapy, 1 pet)   Home or Post Acute Services None   Expected Discharge Disposition Home   Does the patient need discharge transport arranged? No     This TCC met with patient and family at bedside, introduced self and explained role.  Demographic information and insurance verified.  Patient is from home with spouse and daughter.  Independent and driving PTA.  Patient stated she has POTS syndrome and is connected to Pulmonology, Cardiology, and Nephrology.  Patient recently saw Pulmonology ([facility]) on 7/26 and has lung function test scheduled 8/30.  She has nebulizer at home and CPAP she wears at HS.  Patient stated she is pre-diabetic and checks blood sugar several times a week.  Denies SW needs at this time.  Patient plans to return home at discharge, no needs.  Patient's spouse is able to provide transportation home.  Care Transitions will continue to follow.  PCP:  Dr. Darvin Thibodeaux, [phone]; [phone].

## 2024-07-31 NOTE — CONSULTS
"Nutrition Initial Assessment:   Nutrition Assessment    Reason for Assessment: Admission nursing screening (MST=3 for weight loss and decreased PO intakes (father passed away recently so pt has been feeling down off and on))    Patient is a 48 y.o. female presenting with chest pain, STEMI    PmHx: CAD, DLD, COPD    Nutrition History:  Energy Intake:  (not established yet)  Food and Nutrient History: pt was unavailable at time of attempted visit today.  Vitamin/Herbal Supplement Use: vitamin D3  Food Allergies/Intolerances:  None  GI Symptoms:  HENRY  Oral Problems:  HENRY       Anthropometrics:  Height: 162.6 cm (5' 4.02\")   Weight: 69.9 kg (154 lb 1.6 oz)   BMI (Calculated): 26.44  IBW/kg (Dietitian Calculated): 54.5 kg  Percent of IBW: 128 %       Weight History:     Weight Change %:  Weight History / % Weight Change: 6/26 69.1kg, 5/14 70.4kg, 5/6 70.7kg, 11/14/23 67.8kg, 10/5/23 67.3kg  Significant Weight Loss: No    Nutrition Focused Physical Exam Findings:  defer: not available   Subcutaneous Fat Loss:   Orbital Fat Pads: Defer  Muscle Wasting:     Edema:  Edema Location: per physician note\" lower extremity edema\"  Physical Findings:  Skin: Negative    Nutrition Significant Labs:  CBC Trend:   Results from last 7 days   Lab Units 07/31/24  0619   WBC AUTO x10*3/uL 16.8*   RBC AUTO x10*6/uL 4.63   HEMOGLOBIN g/dL 14.8   HEMATOCRIT % 44.3   MCV fL 96   PLATELETS AUTO x10*3/uL 400    , BMP Trend:   Results from last 7 days   Lab Units 07/31/24  0619   GLUCOSE mg/dL 124*   CALCIUM mg/dL 9.4   SODIUM mmol/L 139   POTASSIUM mmol/L 3.8   CO2 mmol/L 26   CHLORIDE mmol/L 103   BUN mg/dL 17   CREATININE mg/dL 1.39*    , A1C:  Lab Results   Component Value Date    HGBA1C 5.7 05/06/2024   , BG POCT trend:        Nutrition Specific Medications:  Reviewed     I/O:    ;      Dietary Orders (From admission, onward)       Start     Ordered    07/31/24 0944  Adult diet Regular  Diet effective now        Question:  Diet type  Answer: "  Regular    07/31/24 0944                     Estimated Needs:      Method for Estimating Needs: 1570-1835kcals (MSJ)     Method for Estimating Needs: 60-70g (1.1-1.3g/kg IBW)     Method for Estimating Needs: 1 mL/kcal or as per MD        Nutrition Diagnosis   Malnutrition Diagnosis  Patient has Malnutrition Diagnosis:  (unable to determine at this time)    Nutrition Diagnosis  Patient has Nutrition Diagnosis: Yes  Diagnosis Status (1): New  Nutrition Diagnosis 1: Predicted inadequate energy intake  Related to (1): stressful life event (death of father)  As Evidenced by (1): reports of decreased PO intakes       Nutrition Interventions/Recommendations         Nutrition Prescription:  Individualized Nutrition Prescription Provided for : Regular diet as ordered.  Will order ONS if meal intakes avg <75%        Nutrition Interventions:   Interventions: Meals and snacks  Meals and Snacks: General healthful diet  Goal: consume >75% of meals         Nutrition Education:   N/A       Nutrition Monitoring and Evaluation   Food/Nutrient Related History Monitoring  Monitoring and Evaluation Plan: Energy intake, Fluid intake, Amount of food  Energy Intake: Estimated energy intake  Criteria: Meal/ONS intake to meet >75% of estimated needs  Fluid Intake: Estimated fluid intake  Criteria: fluid intake to meet >75% of estimated need  Amount of Food: Estimated amout of food  Criteria: Pt to consume >75% of meals/ONS    Body Composition/Growth/Weight History  Monitoring and Evaluation Plan: Weight  Weight: Measured weight  Criteria: reweigh at least every 5 days    Biochemical Data, Medical Tests and Procedures  Monitoring and Evaluation Plan: Electrolyte/renal panel  Criteria: labs WNL    Nutrition Focused Physical Findings  Monitoring and Evaluation Plan: Digestive System, Skin  Criteria: BM at least every 2-3 days  Criteria: maintain skin integrity         Time Spent (min): 45 minutes

## 2024-08-01 ENCOUNTER — PATIENT OUTREACH (OUTPATIENT)
Dept: CARE COORDINATION | Facility: CLINIC | Age: 49
End: 2024-08-01
Payer: COMMERCIAL

## 2024-08-01 LAB
ATRIAL RATE: 84 BPM
P AXIS: 80 DEGREES
PR INTERVAL: 123 MS
Q ONSET: 253 MS
QRS COUNT: 14 BEATS
QRS DURATION: 94 MS
QT INTERVAL: 355 MS
QTC CALCULATION(BAZETT): 420 MS
QTC FREDERICIA: 397 MS
R AXIS: 74 DEGREES
T AXIS: 77 DEGREES
T OFFSET: 431 MS
VENTRICULAR RATE: 84 BPM

## 2024-08-01 NOTE — NURSING NOTE
After receiving report on this patient, patient expressed that she wanted to sign out against medical advice. I contacted Dr. Alberto. He advised me to enforce the risks of her leaving and did not recommend her signing out. Patient verbalized awareness of the risks and dangers of leaving. Patient was still very adamant on leaving. This RN spoke with the nursing supervisor regarding patients expressed want to leave AMA, making physicians aware and educating patient on risks of leaving. Dr. Alberto expressed to the patient that she take her plavix and follow up with Dr. Swan as soon as possible. Upon receiving this information, patient still wanted to leave AMA. Patient signed paperwork and left with  and daughter.

## 2024-08-01 NOTE — DISCHARGE SUMMARY
Discharge Diagnosis  ST elevation myocardial infarction (STEMI), unspecified artery (Multi)    Issues Requiring Follow-Up  Medical therapy for coronary artery disease    Test Results Pending At Discharge  Pending Labs       No current pending labs.            Hospital Course   Please see admission history and physical for details.  Briefly patient is a 48-year-old female history of coronary artery disease, prior stent implantation, continued cigarette smoking who presented with inferior ST elevations.  Urgent cardiac catheterization demonstrated mild circumflex and RCA disease.  Her LAD stent was patent.  She had a 95% stenosis of a high rising diagonal artery status post successful drug-eluting stenting.  Echocardiogram demonstrated normal LV function.    Patient signed out AGAINST MEDICAL ADVICE the evening after her intervention.    Pertinent Physical Exam At Time of Discharge  Physical Exam    Home Medications     Medication List      ASK your doctor about these medications     Advair -21 mcg/actuation inhaler; Generic drug: fluticasone   propion-salmeteroL   aMILoride 5 mg tablet; Commonly known as: Midamor   aspirin 81 mg EC tablet   azelastine 137 mcg (0.1 %) nasal spray; Commonly known as: Astelin   benzonatate 100 mg capsule; Commonly known as: Tessalon   benzoyl peroxide 10 % external wash; Commonly known as: Benzac AC; Apply   1 Application topically once daily.   cholecalciferol 25 MCG (1000 UT) tablet; Commonly known as: Vitamin D-3   clindamycin 1 % lotion; Commonly known as: Cleocin T; Apply topically 2   times a day.   clobetasol 0.05 % ointment; Commonly known as: Temovate   clonazePAM 1 mg tablet; Commonly known as: KlonoPIN   clopidogrel 75 mg tablet; Commonly known as: Plavix; Take 1 tablet (75   mg) by mouth once daily.   cyclobenzaprine 5 mg tablet; Commonly known as: Flexeril   fenofibrate 145 mg tablet; Commonly known as: Tricor   icosapent ethyL 1 gram capsule; Commonly known as:  Vascepa; Take 2   capsules (2 g) by mouth 2 times a day.   lamoTRIgine 100 mg tablet; Commonly known as: LaMICtal   metoprolol succinate XL 25 mg 24 hr tablet; Commonly known as:   Toprol-XL; Take 0.5 tablets (12.5 mg) by mouth once daily.   nicotine 14 mg/24 hr patch; Commonly known as: Nicoderm CQ   nystatin 100,000 unit/mL suspension; Commonly known as: Mycostatin   potassium chloride CR 10 mEq ER tablet; Commonly known as: Klor-Con   Repatha SureClick 140 mg/mL injection; Generic drug: evolocumab; Inject   1 mL (140 mg) under the skin every 14 (fourteen) days.   Spiriva Respimat 2.5 mcg/actuation inhaler; Generic drug: tiotropium   Ventolin HFA 90 mcg/actuation inhaler; Generic drug: albuterol   Xhance 93 mcg/actuation aerosol breath activated; Generic drug:   fluticasone propionate       Outpatient Follow-Up  Future Appointments   Date Time Provider Department Center   9/9/2024  3:30 PM Bao Thibodeaux DO [id number] [place]   10/3/2024  9:15 AM Ravin Corral MD [id number] [place]   11/14/2024  1:45 PM Vinicius Swan DO [id number] [place]   1/20/2025 10:15 AM JAMILA Wayne-CNP [id number] [place]       Rl Alberto MD

## 2024-08-01 NOTE — PROGRESS NOTES
Discharge Facility: [facility]  Discharge Diagnosis: STEMI  Admission Date: 7/31/24  Discharge Date: 7/31/24 left AMA    PCP Appointment Date: 8/9/24  Specialist Appointment Date: 8/2/24 Dr Swan  [facility] Encounter and Summary Linked: Yes  See discharge assessment below for further details    Engagement  Call Start Time: 1054 (8/1/2024 11:51 AM)    Medications  Medications reviewed with patient/caregiver?: Yes (8/1/2024 11:51 AM)  Is the patient having any side effects they believe may be caused by any medication additions or changes?: No (8/1/2024 11:51 AM)  Does the patient have all medications ordered at discharge?: Yes (8/1/2024 11:51 AM)  Care Management Interventions: No intervention needed (8/1/2024 11:51 AM)  Prescription Comments: Continuing on plavix, may be initiated on brilinta, Raegan states there was talks of this prior to her leaving the hospital (8/1/2024 11:51 AM)  Is the patient taking all medications as directed (includes completed medication regime)?: Yes (8/1/2024 11:51 AM)  Care Management Interventions: Provided patient education (8/1/2024 11:51 AM)  Medication Comments: No issues with current medication (8/1/2024 11:51 AM)    Appointments  Does the patient have a primary care provider?: Yes (8/1/2024 11:51 AM)  Care Management Interventions: Verified appointment date/time/provider (8/1/2024 11:51 AM)  Has the patient kept scheduled appointments due by today?: Yes (8/1/2024 11:51 AM)  Care Management Interventions: Advised patient to keep appointment (8/1/2024 11:51 AM)    Self Management  What is the home health agency?: N/A (8/1/2024 11:51 AM)  What Durable Medical Equipment (DME) was ordered?: N/A (8/1/2024 11:51 AM)    Patient Teaching  Does the patient have access to their discharge instructions?: Yes (8/1/2024 11:51 AM)  Care Management Interventions: Reviewed instructions with patient (8/1/2024 11:51 AM)  What is the patient's perception of their health status since discharge?:  Returned to baseline/stable (8/1/2024 11:51 AM)  Is the patient/caregiver able to teach back the hierarchy of who to call/visit for symptoms/problems? PCP, Specialist, Home Health nurse, Urgent Care, ED, 911: Yes (8/1/2024 11:51 AM)  Patient/Caregiver Education Comments: Aware to seek care at ER with any return of chest pain or shortness of breath, continue medications as prescribed and consult with Dr Swan if need for brilinta. (8/1/2024 11:51 AM)    Wrap Up  Wrap Up Additional Comments: Raegan is doing ok since coming home, she says she is very tired. Has been in contact with her nephrologist due to heart cath and use of dye. Will see cardiology tomorrow. (8/1/2024 11:51 AM)  Call End Time: 1100 (8/1/2024 11:51 AM)

## 2024-08-02 ENCOUNTER — OFFICE VISIT (OUTPATIENT)
Dept: CARDIOLOGY | Facility: CLINIC | Age: 49
End: 2024-08-02
Payer: COMMERCIAL

## 2024-08-02 VITALS
OXYGEN SATURATION: 96 % | DIASTOLIC BLOOD PRESSURE: 66 MMHG | SYSTOLIC BLOOD PRESSURE: 118 MMHG | BODY MASS INDEX: 26.29 KG/M2 | WEIGHT: 154 LBS | HEART RATE: 96 BPM | HEIGHT: 64 IN

## 2024-08-02 DIAGNOSIS — J44.9 CHRONIC OBSTRUCTIVE PULMONARY DISEASE, UNSPECIFIED COPD TYPE (MULTI): ICD-10-CM

## 2024-08-02 DIAGNOSIS — Z09 HOSPITAL DISCHARGE FOLLOW-UP: ICD-10-CM

## 2024-08-02 DIAGNOSIS — I25.10 CORONARY ARTERY DISEASE INVOLVING NATIVE CORONARY ARTERY OF NATIVE HEART, UNSPECIFIED WHETHER ANGINA PRESENT: Primary | ICD-10-CM

## 2024-08-02 DIAGNOSIS — I21.3 ST ELEVATION MYOCARDIAL INFARCTION (STEMI), UNSPECIFIED ARTERY (MULTI): ICD-10-CM

## 2024-08-02 DIAGNOSIS — E78.00 PURE HYPERCHOLESTEROLEMIA: ICD-10-CM

## 2024-08-02 DIAGNOSIS — Z72.0 TOBACCO ABUSE: ICD-10-CM

## 2024-08-02 DIAGNOSIS — G47.33 OBSTRUCTIVE SLEEP APNEA, ADULT: ICD-10-CM

## 2024-08-02 DIAGNOSIS — R00.2 PALPITATIONS: ICD-10-CM

## 2024-08-02 DIAGNOSIS — R00.0 TACHYCARDIA: ICD-10-CM

## 2024-08-02 LAB
ATRIAL RATE: 78 BPM
DIASTOLIC BLOOD PRESSURE: 69 MMHG
P AXIS: 79 DEGREES
P OFFSET: 195 MS
P ONSET: 141 MS
PR INTERVAL: 156 MS
Q ONSET: 219 MS
QRS COUNT: 13 BEATS
QRS DURATION: 84 MS
QT INTERVAL: 376 MS
QTC CALCULATION(BAZETT): 428 MS
QTC FREDERICIA: 410 MS
R AXIS: 74 DEGREES
SYSTOLIC BLOOD PRESSURE: 122 MMHG
T AXIS: 37 DEGREES
T OFFSET: 407 MS
VENTRICULAR RATE: 78 BPM

## 2024-08-02 RX ORDER — FENOFIBRATE 145 MG/1
145 TABLET, FILM COATED ORAL DAILY
Qty: 90 TABLET | Refills: 3 | Status: SHIPPED | OUTPATIENT
Start: 2024-08-02

## 2024-08-02 RX ORDER — NITROGLYCERIN 0.4 MG/1
0.4 TABLET SUBLINGUAL EVERY 5 MIN PRN
Qty: 25 TABLET | Refills: 11 | Status: SHIPPED | OUTPATIENT
Start: 2024-08-02 | End: 2025-08-02

## 2024-08-02 RX ORDER — ALBUTEROL SULFATE 0.83 MG/ML
SOLUTION RESPIRATORY (INHALATION)
COMMUNITY
Start: 2024-07-26

## 2024-08-02 NOTE — PATIENT INSTRUCTIONS
Stop clopidogrel  Start Brilinta 180 mg once tomorrow then 90 mg twice daily staring Sunday  Stay active  Stop smoking  Heart healthy diet=plant based diet  Refer to cardiac rehab-s/p stent

## 2024-08-02 NOTE — PROGRESS NOTES
"Chief Complaint:   Hospital Follow-up     History Of Present Illness:    Raegan Perez is a 48 y.o. female presenting for hospital follow-up.    Patient presented hospital 2024 with acute inferior ST elevation.  Urgent cardiac catheterization revealed 95% stenosis of a high rising diagonal artery-successful drug-eluting stent was placed.  Patient subsequently signed out AMA.  CP improved since DC  Still smoking  Under a lot of stress as father              Last Recorded Vitals:  Vitals:    24 0953   BP: 118/66   BP Location: Right arm   Patient Position: Sitting   BP Cuff Size: Adult   Pulse: 96   SpO2: 96%   Weight: 69.9 kg (154 lb)   Height: 1.626 m (5' 4\")            Allergies:  Adhesive tape-silicones, Cat dander, and Cephalexin    Outpatient Medications:  Current Outpatient Medications   Medication Instructions    Advair -21 mcg/actuation inhaler     albuterol 2.5 mg /3 mL (0.083 %) nebulizer solution INHALE 1 VIAL VIA NEBULIZER EVERY 4 HOURS AS NEEDED FOR WHEEZING  SHORTNESS OF BREATH  OR COUGH    aMILoride (Midamor) 5 mg tablet 1 tablet, oral, Daily    aspirin 81 mg EC tablet 1 tablet, oral, Daily    azelastine (Astelin) 137 mcg (0.1 %) nasal spray 1 spray, nasal, 2 times daily    benzonatate (Tessalon) 100 mg capsule oral    benzoyl peroxide (Benzac AC) 10 % external wash 1 Application, Topical, Daily    cholecalciferol (VITAMIN D-3) 25 mcg, oral, Daily    clindamycin (Cleocin T) 1 % lotion Topical, 2 times daily    clobetasol (Temovate) 0.05 % ointment APPLY 1 APPLICATION 2 TIMES PER DAY AS NEEDED FOR 14 DAYS    clonazePAM (KlonoPIN) 1 mg tablet 0.5 tablets, oral, Daily    clopidogrel (PLAVIX) 75 mg, oral, Daily    fenofibrate (Tricor) 145 mg tablet 1 tablet, oral, Daily    icosapent ethyL (VASCEPA) 2 g, oral, 2 times daily    lamoTRIgine (LaMICtal) 100 mg tablet 1 tablet, oral, Daily    metoprolol succinate XL (TOPROL-XL) 12.5 mg, oral, Daily    nicotine (Nicoderm CQ) 14 " mg/24 hr patch     nystatin (Mycostatin) 100,000 unit/mL suspension 5 mL, oral, 4 times daily    potassium chloride CR 10 mEq ER tablet oral, TAKE 1 TABLET BY MOUTH ONCE DAILY WITH FOOD FOR 90 DAYS    Repatha SureClick 140 mg, subcutaneous, Every 14 days    Spiriva Respimat 2.5 mcg/actuation inhaler 2 puffs, inhalation, Daily    Ventolin HFA 90 mcg/actuation inhaler 2 puffs, inhalation, Every 4 hours PRN    Xhance 93 mcg/actuation aerosol breath activated  Use 1 puff twice daily to bilateral sides       Physical Exam:  Constitutional:       General: Awake.      Appearance: Healthy appearance. Not in distress.   Neck:      Vascular: No JVR. JVD normal.   Pulmonary:      Effort: Pulmonary effort is normal.      Breath sounds: Normal breath sounds. No wheezing. No rhonchi. No rales.   Chest:      Chest wall: Not tender to palpatation.   Cardiovascular:      PMI at left midclavicular line. Normal rate. Regular rhythm. Normal S1. Normal S2.       Murmurs: There is no murmur.      No gallop.  No click. No rub.   Pulses:     Intact distal pulses.   Edema:     Peripheral edema absent.   Abdominal:      General: Bowel sounds are normal.      Palpations: Abdomen is soft.      Tenderness: There is no abdominal tenderness.   Musculoskeletal: Normal range of motion.         General: No tenderness. Skin:     General: Skin is warm and dry.   Neurological:      General: No focal deficit present.      Mental Status: Alert and oriented to person, place and time.          Last Labs:  CBC -  Lab Results   Component Value Date    WBC 16.8 (H) 07/31/2024    HGB 14.8 07/31/2024    HCT 44.3 07/31/2024    MCV 96 07/31/2024     07/31/2024       CMP -  Lab Results   Component Value Date    CALCIUM 9.4 07/31/2024    PHOS 3.6 06/05/2024    PROT 6.7 07/31/2024    ALBUMIN 3.9 07/31/2024    AST 10 07/31/2024    ALT 8 07/31/2024    ALKPHOS 83 07/31/2024    BILITOT 0.2 07/31/2024       LIPID PANEL -   Lab Results   Component Value Date     CHOL 311 (H) 06/05/2024    TRIG 637 (H) 06/05/2024    HDL 29.4 06/05/2024    CHHDL 10.6 06/05/2024    LDLF 29 04/14/2023    VLDL  06/05/2024      Comment:      Unable to calculate VLDL.    NHDL 282 (H) 06/05/2024       RENAL FUNCTION PANEL -   Lab Results   Component Value Date    GLUCOSE 124 (H) 07/31/2024     07/31/2024    K 3.8 07/31/2024     07/31/2024    CO2 26 07/31/2024    ANIONGAP 14 07/31/2024    BUN 17 07/31/2024    CREATININE 1.39 (H) 07/31/2024    CALCIUM 9.4 07/31/2024    PHOS 3.6 06/05/2024    ALBUMIN 3.9 07/31/2024        Lab Results   Component Value Date    HGBA1C 5.7 05/06/2024           Lab review: I have personally reviewed the laboratory result(s)       Problem List Items Addressed This Visit       CAD (coronary artery disease) - Primary    Overview     10/8/19 DARIEN LAD  7/31/24 DARIEN diagonal in setting of MI    On DAPT(change Plavix to Brilinta)/beta blocker/statin.Stop smoking.Follow.         Relevant Orders    ECG 12 Lead (Completed)    Chronic obstructive pulmonary disease (Multi)    Overview     Mild emphysema changes per 1/2017 CT-stop smoking. No wheeze on exam.         Obstructive sleep apnea, adult    Overview     On  CPAP         Palpitations    Overview     11/2023 7 day EM unremarkable         Pure hypercholesterolemia    Overview     Pt on high intensity statin-1/2021  lipids with LDL=27..Follow heart healthy diet.  Was  instructed by renal to try and come off statin because of renal insuff-changed to Repatha  6/2024 TCHOL was elevated at 311-had not been taking Repatha regularly  Now back on  Follow         Tobacco abuse    Overview     Needs to stop smoking-discussed importance  She is cutting back-using nicotine patch intrermittently         Hospital discharge follow-up    Overview     Records reviewed          Stop clopidogrel  Start Brilinta 180 mg once tomorrow then 90 mg twice daily staring Sunday  Stay active  Stop smoking  Heart healthy diet=plant based  diet  Refer to cardiac rehab-s/p stent    Vinicius Swan, DO

## 2024-08-05 ENCOUNTER — TELEPHONE (OUTPATIENT)
Dept: CARDIOLOGY | Facility: CLINIC | Age: 49
End: 2024-08-05
Payer: COMMERCIAL

## 2024-08-05 DIAGNOSIS — I25.10 CORONARY ARTERY DISEASE INVOLVING NATIVE CORONARY ARTERY OF NATIVE HEART, UNSPECIFIED WHETHER ANGINA PRESENT: ICD-10-CM

## 2024-08-05 DIAGNOSIS — J44.9 CHRONIC OBSTRUCTIVE PULMONARY DISEASE, UNSPECIFIED COPD TYPE (MULTI): ICD-10-CM

## 2024-08-05 NOTE — TELEPHONE ENCOUNTER
Rosamaria carrion took a message from the pt at 10:20 this am that states    Pt is requesting a handicap placcard rx. States she found out that she has numerous qualifying conditions. Please call patient when available for

## 2024-08-09 ENCOUNTER — APPOINTMENT (OUTPATIENT)
Dept: PRIMARY CARE | Facility: CLINIC | Age: 49
End: 2024-08-09
Payer: COMMERCIAL

## 2024-08-09 VITALS
HEART RATE: 95 BPM | DIASTOLIC BLOOD PRESSURE: 70 MMHG | SYSTOLIC BLOOD PRESSURE: 108 MMHG | BODY MASS INDEX: 25.95 KG/M2 | OXYGEN SATURATION: 96 % | WEIGHT: 151.2 LBS

## 2024-08-09 DIAGNOSIS — I25.10 CORONARY ARTERY DISEASE, UNSPECIFIED VESSEL OR LESION TYPE, UNSPECIFIED WHETHER ANGINA PRESENT, UNSPECIFIED WHETHER NATIVE OR TRANSPLANTED HEART: ICD-10-CM

## 2024-08-09 DIAGNOSIS — I25.118 CORONARY ARTERY DISEASE OF NATIVE HEART WITH STABLE ANGINA PECTORIS, UNSPECIFIED VESSEL OR LESION TYPE (CMS-HCC): ICD-10-CM

## 2024-08-09 DIAGNOSIS — Z72.0 TOBACCO ABUSE: ICD-10-CM

## 2024-08-09 DIAGNOSIS — I20.9 ANGINA PECTORIS (CMS-HCC): ICD-10-CM

## 2024-08-09 DIAGNOSIS — I21.3 ST ELEVATION MYOCARDIAL INFARCTION (STEMI), UNSPECIFIED ARTERY (MULTI): Primary | ICD-10-CM

## 2024-08-09 PROCEDURE — 99495 TRANSJ CARE MGMT MOD F2F 14D: CPT | Performed by: FAMILY MEDICINE

## 2024-08-09 RX ORDER — EVOLOCUMAB 140 MG/ML
140 INJECTION, SOLUTION SUBCUTANEOUS
Qty: 6 ML | Refills: 3 | Status: SHIPPED | OUTPATIENT
Start: 2024-08-09

## 2024-08-09 RX ORDER — BUSPIRONE HYDROCHLORIDE 15 MG/1
1 TABLET ORAL
COMMUNITY
Start: 2024-07-11

## 2024-08-09 ASSESSMENT — ENCOUNTER SYMPTOMS: DEPRESSION: 1

## 2024-08-09 ASSESSMENT — PAIN SCALES - GENERAL: PAINLEVEL: 3

## 2024-08-09 NOTE — PROGRESS NOTES
Subjective   Patient ID: Raegan Perez is a 49 y.o. female who presents for Hospital Follow-up (myocardial infarction.).    HPI   10 days ago went to ER due to heartburn and mild SOB.  Diagnosed with acute MI and underwent stent placement.  Saw cardiologist since hospitalization.  Hospital records were reviewed.  Blood work all stable.  Patient has follow-up with nephrology scheduled  Generally feeling well now.  Eating a healthy diet.  She is walking regularly.  Unfortunately still smoking but trying to cut down.  Review of Systems    Objective   /70 (BP Location: Left arm, Patient Position: Sitting, BP Cuff Size: Adult)   Pulse 95   Wt 68.6 kg (151 lb 3.2 oz)   SpO2 96%   BMI 25.95 kg/m²     Physical Exam  Alert, pleasant and in no acute distress.  Neck: Supple, no JVD or carotid bruit  Heart: Regular rate and rhythm, no murmur  Lungs: Clear to auscultation  Lower extremities: No edema    Assessment/Plan   Problem List Items Addressed This Visit             ICD-10-CM    CAD (coronary artery disease) I25.10    Angina pectoris (CMS-HCC) I20.9    Tobacco abuse Z72.0    ST elevation myocardial infarction (STEMI), unspecified artery (Multi) - Primary I21.3   Patient here for follow-up from the emergency room and hospitalization.  She went successful stent placed for acute MI.  She is doing well.  Stressed the importance of quitting smoking.  Encouraged continued work on healthy diet and exercise.  Follow-up in 6 months

## 2024-08-12 ENCOUNTER — CLINICAL SUPPORT (OUTPATIENT)
Dept: CARDIAC REHAB | Facility: HOSPITAL | Age: 49
End: 2024-08-12
Payer: COMMERCIAL

## 2024-08-12 DIAGNOSIS — I25.10 CORONARY ARTERY DISEASE, UNSPECIFIED VESSEL OR LESION TYPE, UNSPECIFIED WHETHER ANGINA PRESENT, UNSPECIFIED WHETHER NATIVE OR TRANSPLANTED HEART: ICD-10-CM

## 2024-08-12 DIAGNOSIS — I21.3 ST ELEVATION MYOCARDIAL INFARCTION (STEMI), UNSPECIFIED ARTERY (MULTI): ICD-10-CM

## 2024-08-12 RX ORDER — EVOLOCUMAB 140 MG/ML
140 INJECTION, SOLUTION SUBCUTANEOUS
Qty: 6 ML | Refills: 3 | Status: SHIPPED | OUTPATIENT
Start: 2024-08-12 | End: 2024-08-13 | Stop reason: SDUPTHER

## 2024-08-12 NOTE — PROGRESS NOTES
Cardiac Rehabilitation Initial Treatment Plan    Name: Raegan Perez  Medical Record Number: [id number]  YOB: 1975  Age: 49 y.o.    Today’s Date: 8/12/2024  Primary Care Physician: Bao Thibodeaux DO  Referring Physician: Vinicius Swan DO  Program Location: [facility] CARDIAC REHAB     General  Primary Diagnosis:   1. ST elevation myocardial infarction (STEMI), unspecified artery (Multi)  Referral to Cardiac Rehab       2.     PCI  Onset/Date of Diagnosis: 7/31/24    Initial Assessment, not yet started program.    AACVPR Risk Stratification:       Falls Risk: Low  Psychosocial Assessment   Sent PH-Q 9 to MD if score > 20: Survey not yet completed.  Post PH-Q9: To be done at [facility].    Pt reported/currently experiencing stress: Yes; Anxiety; Severity: mild and Depression; Severity: mild  Patient uses stress management skills: Yes   History of:  Anxiety and depression  Currently seeing a mental health provider: Yes, Then provider name: Dr. Hector Dominguez  Social Support: Yes, Whom:Spouse, daughter  Quality of Life Survey: SF-36   SF-36 Pre Post   Physical Component Score TBD TBD   Mental Component Score TBD TBD         General Health Score                 TBD      TBD  Learning Assessment:  Learning assessment/barriers: Emotional  Preferred learning method: Auditory, Visual, and Writing handout  Barriers: None  Comments: Patient has H/O Bi-Polar disorder    Stages of Change:Preparation    Psychosocial Plan    Goal Status: Initial Assessment; goals not yet started    Psychosocial Goals: Maintain or lower PH-Q 9 score by discharge and Maintain or improve Post SF-36 Quality of Life Survey.    Psychosocial Interventions/Education: To be done in Cardiac Rehab.    Initial Assessment: Patient follows curently with a Psychiatrist Dr. Hector Dominguez and is on Psychosocial medications for Bi-Polar disease per patient.    Nutrition Assessment:    Hyperlipidemia: Yes     Lipids:   Lab Results  "  Component Value Date    CHOL 311 (H) 06/05/2024    HDL 29.4 06/05/2024    LDLF 29 04/14/2023    TRIG 637 (H) 06/05/2024       Current Dietary Guidelines:  Regular diet  Barriers to dietary change: no    Diet Habit Survey: New [place] Dietary Assessment  Pre: Initial survey given. Pending completion and return from patient.  Post: To be done at discharge.    Diabetes Assessment    Lab Results   Component Value Date    HGBA1C 5.7 05/06/2024       History of Diabetes: No    Weight Management:  Weight 151lbs  Ht: 64 inches  BMI:25.9  Waist Circ: TBD    Nutrition Plan    Goal Status: Initial Assessment; goals not yet started    Nutrition Goals:  Achieve a score of 80% or greater on Post New Dorothy Dietary Assessment.                               Attend at least 50% of education lectures provided.                               Weight loss of 5lbs by completion of program.    Nutrition Interventions/Education:   AHA handout \"What is angina\"? And \"What is Cardiac Rehabilitation\"?  Lipid profile reviewed  Referral to Outpatient Nutrition Therapy. Patient states her insurance company has a Dietitian that she sets up an appointment with.    Initial Assessment: Goals not yet started.    Exercise Assessment    No  Mode: NA  Frequency: NA  Duration: NA    Exercise Prescription     Exercise Prescription based on: Duke Activity Status Index (DASI) and assessent    DASI Score: 23.45  MET Score: 2.8   Frequency:  3 days/week   Mode: Treadmill, Nu-step,/Nu-step (T),Airdyne,Arm Ergometer   Duration: 30-45 total aerobic minutes   Intensity: RPE 11-15  Target HR:  To be calculated after 6 attended sessions.  MET Level: 2.8  Patient wears supplemental O2: No     Modality Workload METs Duration (minutes)   1 Pre-Exercise/Rest   5:00     2 NuStep 4000 44 colon, Level 2  2.4 10 :00   3 NuStep T5 44 colon, Level 2  2.4 10 :00   4 Treadmill 1.5 mph   2.2 10 :00   5 Airdyne 0.4 Load,  15-20 rpm's  1.8 10 :00   6 Arm Ergometer 16 Colon, Level 1 " 2.2 10:00     7 Post-Exercise/Rest   5:00       Resistance Training:  NA  Home Exercise Prescription given: To be given prior to discharge from program.    Exercise Plan    Goal Status: Initial Assessment; goals not yet started    Exercise Goals: Obtain 150 minutes/week of moderate intensity aerobic exercise                             Exercise frequency 5-7 days/week.  Exercise Interventions/Education:   Exercise progression:  To increase Met level by 5-10% each week.  To increase total exercise duration to 30-45 minutes.  Initial Assessment: Goals not yet started.    Other Core Components/Risk Factor Assessment:    Medication adherence  Current Medications:   Allergies: Adhesive tape; itching, blisters, skin peeling                  Cephalexin; face turns bright red and feels like on fire                  Silicone ?  Medication Documentation Review Audit       Reviewed by Aylin Srivastava on 08/09/24 at 1101      Medication Order Taking? Sig Documenting Provider Last Dose Status   Advair -21 mcg/actuation inhaler [id number] Yes  Historical Provider, MD Taking Active   albuterol 2.5 mg /3 mL (0.083 %) nebulizer solution [id number] Yes INHALE 1 VIAL VIA NEBULIZER EVERY 4 HOURS AS NEEDED FOR WHEEZING  SHORTNESS OF BREATH  OR COUGH Historical Provider, MD Taking Active   aMILoride (Midamor) 5 mg tablet [id number] Yes Take 1 tablet (5 mg) by mouth once daily. Historical Provider, MD Taking Active   aspirin 81 mg EC tablet [id number] Yes Take 1 tablet (81 mg) by mouth once daily. Historical Provider, MD Taking Active   azelastine (Astelin) 137 mcg (0.1 %) nasal spray [id number] Yes Administer 1 spray into affected nostril(s) 2 times a day. Historical Provider, MD Taking Active   benzonatate (Tessalon) 100 mg capsule [id number]  Take by mouth. Historical Provider, MD  Active   benzoyl peroxide (Benzac AC) 10 % external wash [id number] Yes Apply 1 Application topically once daily. Olivia Moreno APRN-CNP Taking Active    busPIRone (Buspar) 15 mg tablet [id number] Yes Take 1 tablet (15 mg) by mouth every 12 hours. Historical Provider, MD  Active   cholecalciferol (Vitamin D-3) 25 MCG (1000 UT) tablet [id number] Yes Take 1 tablet (25 mcg) by mouth once daily. Historical Provider, MD Taking Active   clindamycin (Cleocin T) 1 % lotion [id number] Yes Apply topically 2 times a day. JAMILA Wayne-CNP Taking Active   clobetasol (Temovate) 0.05 % ointment [id number] Yes APPLY 1 APPLICATION 2 TIMES PER DAY AS NEEDED FOR 14 DAYS Historical Provider, MD Taking Active   clonazePAM (KlonoPIN) 1 mg tablet [id number] Yes Take 0.5 tablets (0.5 mg) by mouth once daily. Historical Provider, MD Taking Active   clopidogrel (Plavix) 75 mg tablet [id number]  Take 1 tablet (75 mg) by mouth once daily. Vinicius Swan DO  Active     Discontinued 08/02/24 1620   fenofibrate (Tricor) 145 mg tablet [id number] Yes TAKE ONE TABLET BY MOUTH EVERY DAY Vinicius Swan DO Taking Active   icosapent ethyL (Vascepa) 1 gram capsule [id number] Yes Take 2 capsules (2 g) by mouth 2 times a day. Vinicius Swan DO Taking Active   lamoTRIgine (LaMICtal) 100 mg tablet [id number] No Take 1 tablet (100 mg) by mouth once daily. Historical Provider, MD Not Taking Active   metoprolol succinate XL (Toprol-XL) 25 mg 24 hr tablet [id number] Yes Take 0.5 tablets (12.5 mg) by mouth once daily. Vinicius Swan DO Taking Active   nicotine (Nicoderm CQ) 14 mg/24 hr patch [id number] Yes  Historical Provider, MD Taking Active   nitroglycerin (Nitrostat) 0.4 mg SL tablet [id number] Yes Place 1 tablet (0.4 mg) under the tongue every 5 minutes if needed for chest pain. May repeat dose every 5 minutes for up to 3 doses total. Vinicius Swan DO Taking Active   nystatin (Mycostatin) 100,000 unit/mL suspension [id number] No Take 5 mL (500,000 Units) by mouth 4 times a day. Historical Provider, MD Not Taking Active   potassium chloride CR 10 mEq ER tablet [id number] Yes Take by  mouth. TAKE 1 TABLET BY MOUTH ONCE DAILY WITH FOOD FOR 90 DAYS Historical Provider, MD Taking Active   Repatha SureClick 140 mg/mL injection [id number] Yes Inject 1 mL (140 mg) under the skin every 14 (fourteen) days. Vinicius Swan DO Taking Active   Spiriva Respimat 2.5 mcg/actuation inhaler [id number] Yes Inhale 2 puffs once daily. Historical Provider, MD Taking Active   Tc-99m tetrofosmin (Myoview) injection 10.1 millicurie [id number]   Marily Jasmine MD  Active   Tc-99m tetrofosmin (Myoview) injection 30.5 millicurie [id number]   Vinicius Swan DO  Active   ticagrelor (Brilinta) 90 mg tablet [id number] Yes Take 1 tablet (90 mg) by mouth 2 times a day. Vinicius Swan DO Taking Active   Ventolin HFA 90 mcg/actuation inhaler [id number] Yes Inhale 2 puffs every 4 hours if needed. Historical Provider, MD Taking Active   Xhance 93 mcg/actuation aerosol breath activated [id number] Yes Use 1 puff twice daily to bilateral sides Historical Provider, MD Taking Active                 PATIENT STATES THE PLAVIX WAS DISCONTINUED AND SHE WAS STARTED ON BRILINTA 90MG TWICE DAILY, AND IS NOT USING THE NICODERM PATCH AND NO MENTION OF MYOVIEW INJECTIONS.                Medication compliance: Yes   Uses pill box/organizer: Yes    Carries medication list: Yes     Blood Pressure Management  History of Hypertension: No   Medication Changes: Yes PLAVIX DISCONTINUED AND BRILINTA 90MG TWICE DAILY STARTED.  VITALS TAKEN AT OFFICE VISIT ON 8/9/24:  /70 Lt. Arm  SpO2 96%  Pulse 95  Wt. 151.3    Heart Failure Management  Hx of Heart Failure: No    Smoking/Tobacco Assessment  Social History     Tobacco Use   Smoking Status Every Day    Types: Cigarettes   Smokeless Tobacco Never   Smokes 10 cigarettes/day. Patient states she is not ready to set tobacco cessation date.  Anyone in the home smoke: NO  Other Core Component Plan    Goal Status: Initial Assessment; goals not yet started    Other Core Component Goals: Achieve resting BP of  "< 130/80 by discharge and Abstaining from tobacco 2 hours prior to class, knowledge on medication usage and actions by discharge, patient carries updated list of medications with them at all times,refer to tobacco cessation program, set tobacco quit date while enrolled in the program, smoking cessation by completion of program.    Other Core Component Interventions/Education:   # of cigarettes smoked = 10 per day, BP readings taken pre and post exercise at each session.  Patient continues to smoke/quit goal and reasons for continuing discussed: Patient states she is not ready to set quit date yet. Patient given AHA educational handout \"How Do I Manage My Medications\"?, Patient given \"How To be a Quitter\" Smoking cessation booklet.  Initial Assessment: Goals not yet started.    Individual Patient Goals:    Establish home exercise program 3-5 days/week for 30-60 minutes daily by completion of program.  Become comfortable with my exercise program by completion of program.  Weight loss of 5lbs by completion of program.    Goal Status: Initial Assessment; goals not yet started    PHYSICIAN REVIEW AND RESPONSE:  Please check appropriate boxes and sign    __X_ The participant may enroll in the Cardiac Rehabilitation Program with the above individualized treatment plan (ITP)  ____ Defer exercise guidelines and outcomes per department policy and protocol  ____ Make the following changes to the ITP/Exercise Prescription:      Rehab Staff Signature: Connie Mccall RN        "

## 2024-08-13 DIAGNOSIS — I25.10 CORONARY ARTERY DISEASE, UNSPECIFIED VESSEL OR LESION TYPE, UNSPECIFIED WHETHER ANGINA PRESENT, UNSPECIFIED WHETHER NATIVE OR TRANSPLANTED HEART: ICD-10-CM

## 2024-08-13 RX ORDER — EVOLOCUMAB 140 MG/ML
140 INJECTION, SOLUTION SUBCUTANEOUS
Qty: 6 ML | Refills: 3 | Status: SHIPPED | OUTPATIENT
Start: 2024-08-13

## 2024-08-15 ENCOUNTER — PATIENT OUTREACH (OUTPATIENT)
Dept: CARE COORDINATION | Facility: CLINIC | Age: 49
End: 2024-08-15
Payer: COMMERCIAL

## 2024-08-15 NOTE — PROGRESS NOTES
Call regarding appt. with PCP on 8/9/24 after hospitalization.  At time of outreach call the patient feels as if their condition has improved since last visit.  Reviewed the PCP appointment with the pt and addressed any questions or concerns.

## 2024-08-26 ENCOUNTER — CLINICAL SUPPORT (OUTPATIENT)
Dept: CARDIAC REHAB | Facility: HOSPITAL | Age: 49
End: 2024-08-26
Payer: COMMERCIAL

## 2024-08-26 DIAGNOSIS — Z95.5 S/P CORONARY ARTERY STENT PLACEMENT: ICD-10-CM

## 2024-08-26 PROCEDURE — 93798 PHYS/QHP OP CAR RHAB W/ECG: CPT | Performed by: INTERNAL MEDICINE

## 2024-08-26 NOTE — PROGRESS NOTES
Cardiac Rehabilitation Initial Treatment Plan    Name: Raegan Perez  Medical Record Number: [id number]  YOB: 1975  Age: 49 y.o.    Today’s Date: 8/26/2024  Primary Care Physician: Bao Thibodeaux DO  Referring Physician: Vinicius Swan DO  Program Location: [facility]     General  Primary Diagnosis:   1. ST elevation myocardial infarction (STEMI), unspecified artery (Multi)  Referral to Cardiac Rehab       2.     PCI  Onset/Date of Diagnosis: 7/31/24    Initial Assessment, not yet started program.    AACVPR Risk Stratification:       Falls Risk: Low  Psychosocial Assessment   Sent PH-Q 9 to MD if score > 20: Survey not yet completed.  Post PH-Q9: To be done at [facility].    Pt reported/currently experiencing stress: Yes; Anxiety; Severity: mild and Depression; Severity: mild  Patient uses stress management skills: Yes   History of:  Anxiety and depression  Currently seeing a mental health provider: Yes, Then provider name: Dr. Hector Dominguez  Social Support: Yes, Whom:Spouse, daughter  Quality of Life Survey: SF-36   SF-36 Pre Post   Physical Component Score TBD TBD   Mental Component Score TBD TBD         General Health Score                 TBD      TBD  Learning Assessment:  Learning assessment/barriers: Emotional  Preferred learning method: Auditory, Visual, and Writing handout  Barriers: None  Comments: Patient has H/O Bi-Polar disorder    Stages of Change:Preparation    Psychosocial Plan    Goal Status: Initial Assessment; goals not yet started    Psychosocial Goals: Maintain or lower PH-Q 9 score by discharge and Maintain or improve Post SF-36 Quality of Life Survey.    Psychosocial Interventions/Education: To be done in Cardiac Rehab.    Initial Assessment: Patient follows curently with a Psychiatrist Dr. Hector Dominguez and is on Psychosocial medications for Bi-Polar disease per patient.    Nutrition Assessment:    Hyperlipidemia: Yes     Lipids:   Lab Results  "  Component Value Date    CHOL 311 (H) 06/05/2024    HDL 29.4 06/05/2024    LDLF 29 04/14/2023    TRIG 637 (H) 06/05/2024       Current Dietary Guidelines:  Regular diet  Barriers to dietary change: no    Diet Habit Survey: [place] Dietary Assessment  Pre: Initial survey given. Pending completion and return from patient.  Post: To be done at discharge.    Diabetes Assessment    Lab Results   Component Value Date    HGBA1C 5.7 05/06/2024       History of Diabetes: No    Weight Management:  Weight 151lbs  Ht: 64 inches  BMI:25.9  Waist Circ: TBD    Nutrition Plan    Goal Status: Initial Assessment; goals not yet started    Nutrition Goals:  Achieve a score of 80% or greater on Post [place] Dietary Assessment.                               Attend at least 50% of education lectures provided.                               Weight loss of 5lbs by completion of program.    Nutrition Interventions/Education:   AHA handout \"What is angina\"? And \"What is Cardiac Rehabilitation\"?  Lipid profile reviewed  Referral to Outpatient Nutrition Therapy. Patient states her insurance company has a Dietitian that she sets up an appointment with.    Initial Assessment: Goals not yet started.    Exercise Assessment    No  Mode: NA  Frequency: NA  Duration: NA    Exercise Prescription     Exercise Prescription based on: Duke Activity Status Index (DASI) and assessent    DASI Score: 23.45  MET Score: 2.8   Frequency:  3 days/week   Mode: Treadmill, Nu-step,/Nu-step (T),Airdyne,Arm Ergometer   Duration: 30-45 total aerobic minutes   Intensity: RPE 11-15  Target HR:  To be calculated after 6 attended sessions.  MET Level: 2.8  Patient wears supplemental O2: No     Modality Workload METs Duration (minutes)   1 Pre-Exercise/Rest   5:00     2 NuStep 4000 44 colon, Level 2  2.4 10 :00   3 NuStep T5 44 colon, Level 2  2.4 10 :00   4 Treadmill 1.5 mph   2.2 10 :00   5 Airdyne 0.4 Load,  15-20 rpm's  1.8 10 :00   6 Arm Ergometer 16 Colon, Level 1 " 2.2 10:00     7 Post-Exercise/Rest   5:00       Resistance Training:  NA  Home Exercise Prescription given: To be given prior to discharge from program.    Exercise Plan    Goal Status: Initial Assessment; goals not yet started    Exercise Goals: Obtain 150 minutes/week of moderate intensity aerobic exercise                             Exercise frequency 5-7 days/week.  Exercise Interventions/Education:   Exercise progression:  To increase Met level by 5-10% each week.  To increase total exercise duration to 30-45 minutes.  Initial Assessment: Goals not yet started.    Other Core Components/Risk Factor Assessment:    Medication adherence  Current Medications:   Allergies: Adhesive tape; itching, blisters, skin peeling                  Cephalexin; face turns bright red and feels like on fire                  Silicone ?  Medication Documentation Review Audit       Reviewed by Aylin Srivastava on 08/09/24 at 1101      Medication Order Taking? Sig Documenting Provider Last Dose Status   Advair -21 mcg/actuation inhaler [id number] Yes  Historical Provider, MD Taking Active   albuterol 2.5 mg /3 mL (0.083 %) nebulizer solution [id number] Yes INHALE 1 VIAL VIA NEBULIZER EVERY 4 HOURS AS NEEDED FOR WHEEZING  SHORTNESS OF BREATH  OR COUGH Historical Provider, MD Taking Active   aMILoride (Midamor) 5 mg tablet [id number] Yes Take 1 tablet (5 mg) by mouth once daily. Historical Provider, MD Taking Active   aspirin 81 mg EC tablet [id number] Yes Take 1 tablet (81 mg) by mouth once daily. Historical Provider, MD Taking Active   azelastine (Astelin) 137 mcg (0.1 %) nasal spray [id number] Yes Administer 1 spray into affected nostril(s) 2 times a day. Historical Provider, MD Taking Active   benzonatate (Tessalon) 100 mg capsule [id number]  Take by mouth. Historical Provider, MD  Active   benzoyl peroxide (Benzac AC) 10 % external wash [id number] Yes Apply 1 Application topically once daily. Olivia Moreno APRN-CNP Taking Active    busPIRone (Buspar) 15 mg tablet [id number] Yes Take 1 tablet (15 mg) by mouth every 12 hours. Historical Provider, MD  Active   cholecalciferol (Vitamin D-3) 25 MCG (1000 UT) tablet [id number] Yes Take 1 tablet (25 mcg) by mouth once daily. Historical Provider, MD Taking Active   clindamycin (Cleocin T) 1 % lotion [id number] Yes Apply topically 2 times a day. JAMILA Wayne-CNP Taking Active   clobetasol (Temovate) 0.05 % ointment [id number] Yes APPLY 1 APPLICATION 2 TIMES PER DAY AS NEEDED FOR 14 DAYS Historical Provider, MD Taking Active   clonazePAM (KlonoPIN) 1 mg tablet [id number] Yes Take 0.5 tablets (0.5 mg) by mouth once daily. Historical Provider, MD Taking Active   clopidogrel (Plavix) 75 mg tablet [id number]  Take 1 tablet (75 mg) by mouth once daily. Vinicius Swan DO  Active     Discontinued 08/02/24 1620   fenofibrate (Tricor) 145 mg tablet [id number] Yes TAKE ONE TABLET BY MOUTH EVERY DAY Vinicius Swan DO Taking Active   icosapent ethyL (Vascepa) 1 gram capsule [id number] Yes Take 2 capsules (2 g) by mouth 2 times a day. Vinicius Swan DO Taking Active   lamoTRIgine (LaMICtal) 100 mg tablet [id number] No Take 1 tablet (100 mg) by mouth once daily. Historical Provider, MD Not Taking Active   metoprolol succinate XL (Toprol-XL) 25 mg 24 hr tablet [id number] Yes Take 0.5 tablets (12.5 mg) by mouth once daily. Vinicius Swan DO Taking Active   nicotine (Nicoderm CQ) 14 mg/24 hr patch [id number] Yes  Historical Provider, MD Taking Active   nitroglycerin (Nitrostat) 0.4 mg SL tablet [id number] Yes Place 1 tablet (0.4 mg) under the tongue every 5 minutes if needed for chest pain. May repeat dose every 5 minutes for up to 3 doses total. Vinicius Swan DO Taking Active   nystatin (Mycostatin) 100,000 unit/mL suspension [id number] No Take 5 mL (500,000 Units) by mouth 4 times a day. Historical Provider, MD Not Taking Active   potassium chloride CR 10 mEq ER tablet [id number] Yes Take by  mouth. TAKE 1 TABLET BY MOUTH ONCE DAILY WITH FOOD FOR 90 DAYS Historical Provider, MD Taking Active   Repatha SureClick 140 mg/mL injection [id number] Yes Inject 1 mL (140 mg) under the skin every 14 (fourteen) days. Vinicius Swan DO Taking Active   Spiriva Respimat 2.5 mcg/actuation inhaler [id number] Yes Inhale 2 puffs once daily. Historical Provider, MD Taking Active   Tc-99m tetrofosmin (Myoview) injection 10.1 millicurie [id number]   Marily Jasmine MD  Active   Tc-99m tetrofosmin (Myoview) injection 30.5 millicurie [id number]   Vinicius Swan DO  Active   ticagrelor (Brilinta) 90 mg tablet [id number] Yes Take 1 tablet (90 mg) by mouth 2 times a day. Vinicius Swan DO Taking Active   Ventolin HFA 90 mcg/actuation inhaler [id number] Yes Inhale 2 puffs every 4 hours if needed. Historical Provider, MD Taking Active   Xhance 93 mcg/actuation aerosol breath activated [id number] Yes Use 1 puff twice daily to bilateral sides Historical Provider, MD Taking Active                 PATIENT STATES THE PLAVIX WAS DISCONTINUED AND SHE WAS STARTED ON BRILINTA 90MG TWICE DAILY, AND IS NOT USING THE NICODERM PATCH AND NO MENTION OF MYOVIEW INJECTIONS.                Medication compliance: Yes   Uses pill box/organizer: Yes    Carries medication list: Yes     Blood Pressure Management  History of Hypertension: No   Medication Changes: Yes PLAVIX DISCONTINUED AND BRILINTA 90MG TWICE DAILY STARTED.  VITALS TAKEN AT OFFICE VISIT ON 8/9/24:  /70 Lt. Arm  SpO2 96%  Pulse 95  Wt. 151.3    Heart Failure Management  Hx of Heart Failure: No    Smoking/Tobacco Assessment  Social History     Tobacco Use   Smoking Status Every Day    Types: Cigarettes   Smokeless Tobacco Never   Smokes 10 cigarettes/day. Patient states she is not ready to set tobacco cessation date.  Anyone in the home smoke: NO  Other Core Component Plan    Goal Status: Initial Assessment; goals not yet started    Other Core Component Goals: Achieve resting BP of  "< 130/80 by discharge and Abstaining from tobacco 2 hours prior to class, knowledge on medication usage and actions by discharge, patient carries updated list of medications with them at all times,refer to tobacco cessation program, set tobacco quit date while enrolled in the program, smoking cessation by completion of program.    Other Core Component Interventions/Education:   # of cigarettes smoked = 10 per day, BP readings taken pre and post exercise at each session.  Patient continues to smoke/quit goal and reasons for continuing discussed: Patient states she is not ready to set quit date yet. Patient given AHA educational handout \"How Do I Manage My Medications\"?, Patient given \"How To be a Quitter\" Smoking cessation booklet.  Initial Assessment: Goals not yet started.    Individual Patient Goals:    Establish home exercise program 3-5 days/week for 30-60 minutes daily by completion of program.  Become comfortable with my exercise program by completion of program.  Weight loss of 5lbs by completion of program.    Goal Status: Initial Assessment; goals not yet started    PHYSICIAN REVIEW AND RESPONSE:  Please check appropriate boxes and sign    __X_ The participant may enroll in the Cardiac Rehabilitation Program with the above individualized treatment plan (ITP)  ____ Defer exercise guidelines and outcomes per department policy and protocol  ____ Make the following changes to the ITP/Exercise Prescription:      Rehab Staff Signature: Germán Guerin        "

## 2024-08-28 ENCOUNTER — CLINICAL SUPPORT (OUTPATIENT)
Dept: CARDIAC REHAB | Facility: HOSPITAL | Age: 49
End: 2024-08-28
Payer: COMMERCIAL

## 2024-08-28 DIAGNOSIS — Z95.5 S/P CORONARY ARTERY STENT PLACEMENT: ICD-10-CM

## 2024-08-28 PROCEDURE — 93798 PHYS/QHP OP CAR RHAB W/ECG: CPT | Performed by: INTERNAL MEDICINE

## 2024-08-30 ENCOUNTER — CLINICAL SUPPORT (OUTPATIENT)
Dept: CARDIAC REHAB | Facility: HOSPITAL | Age: 49
End: 2024-08-30
Payer: COMMERCIAL

## 2024-08-30 DIAGNOSIS — Z95.5 S/P CORONARY ARTERY STENT PLACEMENT: ICD-10-CM

## 2024-08-30 PROCEDURE — 93798 PHYS/QHP OP CAR RHAB W/ECG: CPT | Performed by: INTERNAL MEDICINE

## 2024-09-04 ENCOUNTER — CLINICAL SUPPORT (OUTPATIENT)
Dept: CARDIAC REHAB | Facility: HOSPITAL | Age: 49
End: 2024-09-04
Payer: COMMERCIAL

## 2024-09-04 DIAGNOSIS — Z95.5 S/P CORONARY ARTERY STENT PLACEMENT: ICD-10-CM

## 2024-09-04 PROCEDURE — 93798 PHYS/QHP OP CAR RHAB W/ECG: CPT | Performed by: INTERNAL MEDICINE

## 2024-09-09 ENCOUNTER — APPOINTMENT (OUTPATIENT)
Dept: PRIMARY CARE | Facility: CLINIC | Age: 49
End: 2024-09-09
Payer: COMMERCIAL

## 2024-09-09 ENCOUNTER — CLINICAL SUPPORT (OUTPATIENT)
Dept: CARDIAC REHAB | Facility: HOSPITAL | Age: 49
End: 2024-09-09
Payer: COMMERCIAL

## 2024-09-09 DIAGNOSIS — Z95.5 S/P CORONARY ARTERY STENT PLACEMENT: ICD-10-CM

## 2024-09-09 PROCEDURE — 93798 PHYS/QHP OP CAR RHAB W/ECG: CPT | Performed by: INTERNAL MEDICINE

## 2024-09-09 NOTE — PROGRESS NOTES
Cardiac Rehabilitation {[facility] CR/TN/VR NOTE TYPE:76602}    Name: Raegan Perez  Medical Record Number: [id number]  YOB: 1975  Age: 49 y.o.    Today’s Date: 9/9/2024  Primary Care Physician: Bao Thibodeaux DO  Referring Physician: Vinicius Swan DO  Program Location: [facility]     General  Primary Diagnosis:   1. S/P coronary artery stent placement  Follow Up In Cardiac Rehab         Onset/Date of Diagnosis: ***    {UH CR/TN/VR Session Number:22801}    AACVPR Risk Stratification:       Falls Risk: {UH CR/TN/VR FALLS RISK:29167}  Psychosocial Assessment     No data recorded    Sent PH-Q 9 to MD if score > 20: {UH CR/TN/VR PHQ-9 SENT TO MD:41477}    Pt reported/currently experiencing stress: {UH CR/TN/VR Reported Stress:56664}  Patient uses stress management skills: {YES/NO:200010}  History of: {Hx Anxiety/Depression:99871}  Currently seeing a mental health provider: {Yes/No:80092}  Social Support: {Yes/No:84782}  Quality of Life Survey: {UH CR/TN/VR QOLS:53444}  Learning Assessment:  Learning assessment/barriers: {Assessment/barriers:29628}  Preferred learning method: {Preferred learning method:28913}  Barriers: {Barriers to Education:91259}  Comments:    Stages of Change:{Stages of change:43272}    Psychosocial Plan    Goal Status: {[facility] CR/TN/VR Goal Status:73644}    Psychosocial Goals: {UH CR/TN/VR PSYCHOSOCIAL GOALS:82049}    Psychosocial Interventions/Education: {UH CR/TN/VR To be done in Cardiac Rehab:71580}    {UH AMB CR/TN/VR Psychosocial Initial Reassess Discharge:00556}    Nutrition Assessment:    Hyperlipidemia: {YES/NO:200010}    Lipids:   Lab Results   Component Value Date    CHOL 311 (H) 06/05/2024    HDL 29.4 06/05/2024    LDLF 29 04/14/2023    TRIG 637 (H) 06/05/2024       Current Dietary Guidelines: {Dietary Guidelines:62705:x}  Barriers to dietary change: {response; yes (wildcard)/no:843723}    Diet Habit Survey: Picture Your Plate  Pre: {UH CR/TN/VR PYP  Initial:94253}  Post: { CR/MI/VR PYP Post:77106}    Diabetes Assessment    Lab Results   Component Value Date    HGBA1C 5.7 05/06/2024       History of Diabetes: { CR/MI/VR DM ASSESSMENT:70891}    Weight Management       No data recorded    Nutrition Plan    Goal Status: { CR/MI/VR Goal Status:49724}    Nutrition Goals: { CR/MI/VR Nutrition Goals:82563}    Nutrition Interventions/Education:   {UH CR/MI/VR To be done in Cardiac Rehab:21935}    { AMB CR/MI/VR Nutrition Initial Reassess Discharge:53055}    Exercise Assessment    { CR/MI/VR Current Home Exercise:00013}    Exercise Prescription     Exercise Prescription based on: { CR/MI/VR ExRx Evaluation:88563}   Frequency:  { CR/MI/VR Session Frequency:19713} days/week   Mode: {Mode:57011}   Duration: *** total aerobic minutes   Intensity: RPE ***  Target HR:  { CR/MI/VR Target HR:88688}  MET Level: ***  Patient wears supplemental O2: { CR/MI/VR Supplemental O2:86586}     Modality Workload METs Duration (minutes)   1 Pre-Exercise      2 { CR/MI/VR Exercise Modality:73680} ***  *** *** :00   3 { CR/MI/VR Exercise Modality:06194} ***  *** *** :00   4 { CR/MI/VR Exercise Modality:76176} ***  *** *** :00   5 { CR/MI/VR Exercise Modality:75407} ***  *** *** :00   6 Post-Exercise        Resistance Training: {YES/NO:560123}  Home Exercise Prescription given: { CR/MI/VR Home Ex Rx:37233}    Exercise Plan    Goal Status: { CR/MI/VR Goal Status:14381}    Exercise Goals: { CR/MI/VR Exercise Goals:23800}    Exercise Interventions/Education:   { CR/MI/VR To be done in Cardiac Rehab:72936}    { AMB CR/MI/VR Exercise Initial Reassess Discharge:79161}    Other Core Components/Risk Factor Assessment:    Medication adherence  Current Medications:   Medication Documentation Review Audit       Reviewed by Aylin Srivastava on 08/09/24 at 1101      Medication Order Taking? Sig Documenting Provider Last Dose Status   Advair -21 mcg/actuation  inhaler [id number] Yes  Historical Provider, MD Taking Active   albuterol 2.5 mg /3 mL (0.083 %) nebulizer solution [id number] Yes INHALE 1 VIAL VIA NEBULIZER EVERY 4 HOURS AS NEEDED FOR WHEEZING  SHORTNESS OF BREATH  OR COUGH Historical MD [place] Taking Active   aMILoride (Midamor) 5 mg tablet [id number] Yes Take 1 tablet (5 mg) by mouth once daily. Parminder Provider, MD Taking Active   aspirin 81 mg EC tablet [id number] Yes Take 1 tablet (81 mg) by mouth once daily. Historical Provider, MD Taking Active   azelastine (Astelin) 137 mcg (0.1 %) nasal spray [id number] Yes Administer 1 spray into affected nostril(s) 2 times a day. Historical Provider, MD Taking Active   benzonatate (Tessalon) 100 mg capsule [id number]  Take by mouth. Historical Provider, MD  Active   benzoyl peroxide (Benzac AC) 10 % external wash [id number] Yes Apply 1 Application topically once daily. VÍCTOR Wayne Taking Active   busPIRone (Buspar) 15 mg tablet [id number] Yes Take 1 tablet (15 mg) by mouth every 12 hours. Historical Provider, MD  Active   cholecalciferol (Vitamin D-3) 25 MCG (1000 UT) tablet [id number] Yes Take 1 tablet (25 mcg) by mouth once daily. Historical Provider, MD Taking Active   clindamycin (Cleocin T) 1 % lotion [id number] Yes Apply topically 2 times a day. VÍCTOR Wayne Taking Active   clobetasol (Temovate) 0.05 % ointment [id number] Yes APPLY 1 APPLICATION 2 TIMES PER DAY AS NEEDED FOR 14 DAYS Historical MD [place] Taking Active   clonazePAM (KlonoPIN) 1 mg tablet [id number] Yes Take 0.5 tablets (0.5 mg) by mouth once daily. Historical Provider, MD Taking Active   clopidogrel (Plavix) 75 mg tablet [id number]  Take 1 tablet (75 mg) by mouth once daily. Vinicius Swan DO  Active     Discontinued 08/02/24 1620   fenofibrate (Tricor) 145 mg tablet [id number] Yes TAKE ONE TABLET BY MOUTH EVERY DAY Vinicius Swan DO Taking Active   icosapent ethyL (Vascepa) 1 gram capsule [id number] Yes Take 2 capsules  (2 g) by mouth 2 times a day. Vinicius Swan DO Taking Active   lamoTRIgine (LaMICtal) 100 mg tablet [id number] No Take 1 tablet (100 mg) by mouth once daily. Historical Provider, MD Not Taking Active   metoprolol succinate XL (Toprol-XL) 25 mg 24 hr tablet [id number] Yes Take 0.5 tablets (12.5 mg) by mouth once daily. Vinicius Swan DO Taking Active   nicotine (Nicoderm CQ) 14 mg/24 hr patch [id number] Yes  Historical Provider, MD Taking Active   nitroglycerin (Nitrostat) 0.4 mg SL tablet [id number] Yes Place 1 tablet (0.4 mg) under the tongue every 5 minutes if needed for chest pain. May repeat dose every 5 minutes for up to 3 doses total. Vinciius Swan DO Taking Active   nystatin (Mycostatin) 100,000 unit/mL suspension [id number] No Take 5 mL (500,000 Units) by mouth 4 times a day. Historical Provider, MD Not Taking Active   potassium chloride CR 10 mEq ER tablet [id number] Yes Take by mouth. TAKE 1 TABLET BY MOUTH ONCE DAILY WITH FOOD FOR 90 DAYS Historical Provider, MD Taking Active   Repatha SureClick 140 mg/mL injection [id number] Yes Inject 1 mL (140 mg) under the skin every 14 (fourteen) days. Vinicius Swan DO Taking Active   Spiriva Respimat 2.5 mcg/actuation inhaler [id number] Yes Inhale 2 puffs once daily. Historical Provider, MD Taking Active   Tc-99m tetrofosmin (Myoview) injection 10.1 millicurie [id number]   Marily Jasmine MD  Active   Tc-99m tetrofosmin (Myoview) injection 30.5 millicurie [id number]   Vinicius Swan DO  Active   ticagrelor (Brilinta) 90 mg tablet [id number] Yes Take 1 tablet (90 mg) by mouth 2 times a day. Vinicius Swan DO Taking Active   Ventolin HFA 90 mcg/actuation inhaler [id number] Yes Inhale 2 puffs every 4 hours if needed. Historical Provider, MD Taking Active   Xhance 93 mcg/actuation aerosol breath activated [id number] Yes Use 1 puff twice daily to bilateral sides Historical Provider, MD Taking Active                                 Medication compliance: {UH  GEN YES NO WILDCARD WILDCARD:50575}   Uses pill box/organizer: {YES/NO:200010}   Carries medication list: {YES/NO:200010}    Blood Pressure Management  History of Hypertension: {YES/NO:200010}  Medication Changes: {YES/NO:200010}  Resting BP:  There were no vitals taken for this visit.     Heart Failure Management  Hx of Heart Failure: {UH CR/ID/VR HF Management:52858}    Smoking/Tobacco Assessment  Social History     Tobacco Use   Smoking Status Every Day    Types: Cigarettes   Smokeless Tobacco Never       Other Core Component Plan    Goal Status: {UH CR/ID/VR Goal Status:58548}    Other Core Component Goals: {UH CR/ID/VR Other Goals:98370}    Other Core Component Interventions/Education:   {UH CR/ID/VR Other Goals:10838}    {UH AMB CR/ID/VR Other Core Initial Reassess Discharge:89815}    Individual Patient Goals:    ***  ***    Goal Status: {UH CR/ID/VR Goal Status:23648}    Staff Comments:  ***    Rehab Staff Signature: Connie Mccall RN

## 2024-09-11 ENCOUNTER — CLINICAL SUPPORT (OUTPATIENT)
Dept: CARDIAC REHAB | Facility: HOSPITAL | Age: 49
End: 2024-09-11
Payer: COMMERCIAL

## 2024-09-11 DIAGNOSIS — Z95.5 S/P CORONARY ARTERY STENT PLACEMENT: ICD-10-CM

## 2024-09-11 PROCEDURE — 93798 PHYS/QHP OP CAR RHAB W/ECG: CPT | Performed by: INTERNAL MEDICINE

## 2024-09-13 ENCOUNTER — CLINICAL SUPPORT (OUTPATIENT)
Dept: CARDIAC REHAB | Facility: HOSPITAL | Age: 49
End: 2024-09-13
Payer: COMMERCIAL

## 2024-09-13 DIAGNOSIS — Z95.5 S/P CORONARY ARTERY STENT PLACEMENT: ICD-10-CM

## 2024-09-13 PROCEDURE — 93798 PHYS/QHP OP CAR RHAB W/ECG: CPT | Performed by: INTERNAL MEDICINE

## 2024-09-19 ENCOUNTER — PATIENT OUTREACH (OUTPATIENT)
Dept: PRIMARY CARE | Facility: CLINIC | Age: 49
End: 2024-09-19
Payer: COMMERCIAL

## 2024-09-19 NOTE — PROGRESS NOTES
Successful outreach to patient regarding hospitalization as patient continues TCM program.   At time of outreach call the patient feels as if their condition has returned to baseline since initial visit with PCP or specialist.  Questions or concerns addressed at this time with patient.   Provided contact information to patient if any further non-emergent needs arise.      Only complaint is that she has not been feeling well lately, says her throat was scratchy, fatigued, sometimes elevated heartrate before cardiac rehab preventing her from participating. Sometimes nausea. She states cardiac rehab notifies her cardiologist about her HR.    She is aware Dr Thibodeaux is out this week, wondering if she should have blood work checked for her kidneys due to possible virus?

## 2024-09-21 DIAGNOSIS — E78.00 PURE HYPERCHOLESTEROLEMIA: Primary | ICD-10-CM

## 2024-09-21 RX ORDER — ALIROCUMAB 75 MG/ML
75 INJECTION, SOLUTION SUBCUTANEOUS
Qty: 1 PEN | Refills: 3 | Status: SHIPPED | OUTPATIENT
Start: 2024-09-21 | End: 2025-09-21

## 2024-09-23 ENCOUNTER — CLINICAL SUPPORT (OUTPATIENT)
Dept: CARDIAC REHAB | Facility: HOSPITAL | Age: 49
End: 2024-09-23
Payer: COMMERCIAL

## 2024-09-23 DIAGNOSIS — Z95.5 S/P CORONARY ARTERY STENT PLACEMENT: ICD-10-CM

## 2024-09-23 PROCEDURE — 93798 PHYS/QHP OP CAR RHAB W/ECG: CPT | Performed by: INTERNAL MEDICINE

## 2024-09-24 NOTE — PROGRESS NOTES
Cardiac Rehabilitation 30 Day Reassessment 9/25/2024    Name: Raegan Perez  Medical Record Number: [id number]  YOB: 1975  Age: 49 y.o.    Today’s Date: 9/24/2024  Primary Care Physician: Bao Thibodeaux DO  Referring Physician: Vinicius Swan DO  Program Location: [facility] CARDIAC REHAB     General  Primary Diagnosis: PCI  1. S/P coronary artery stent placement  Follow Up In Cardiac Rehab       2.     PCI  Onset/Date of Diagnosis: 7/31/24    30 Day Reassessment 9/25/2024    8 Sessions Attended Shows Consistent Attendance    AACVPR Risk Stratification:   High    Falls Risk: Low  Psychosocial Assessment   Sent PH-Q 9 to MD if score > 20:  11  Post PH-Q9: To be done at disharge.    Pt reported/currently experiencing stress: Yes; Anxiety; Severity: mild and Depression; Severity: mild  Patient uses stress management skills: Yes   History of:  Anxiety and depression  Currently seeing a mental health provider: Yes, Then provider name: Dr. Hector Dominguez  Social Support: Yes, Whom:Spouse, daughter  Quality of Life Survey: SF-36   SF-36 Pre Post   Physical Component Score 38.09 TBD   Mental Component Score 32.36 TBD         General Health Score                 28.15     TBD  Learning Assessment:  Learning assessment/barriers: Emotional  Preferred learning method: Auditory, Visual, and Writing handout  Barriers: None  Comments: Patient has H/O Bi-Polar disorder    Stages of Change: Action    Psychosocial Plan    Goal Status: In Progress    Psychosocial Goals: Maintain or lower PH-Q 9 score by discharge and Maintain or improve Post SF-36 Quality of Life Survey.    Psychosocial Interventions/Education:   Patient follows curently with a Psychiatrist Dr. Hector Dominguez and is on Psychosocial medications for Bi-Polar disease per patient.    30 Day Reassessment 9/25/2024    Nutrition Assessment:    Hyperlipidemia: Yes     Lipids:   Lab Results   Component Value Date    CHOL 311 (H) 06/05/2024  "   HDL 29.4 06/05/2024    LDLF 29 04/14/2023    TRIG 637 (H) 06/05/2024       Current Dietary Guidelines:  Regular diet  Barriers to dietary change: no    Diet Habit Survey: [place] Dietary Assessment  Pre: Initial survey given. Pending completion and return from patient.  Post: To be done at discharge.    Diabetes Assessment    Lab Results   Component Value Date    HGBA1C 5.7 05/06/2024       History of Diabetes: No    Weight Management:  Weight 151.2 lbs  Ht: 64 inches  BMI:25.9  Waist Circ: TBD    Nutrition Plan    Goal Status: In Progress    Nutrition Goals:  Achieve a score of 80% or greater on Post [place] Dietary Assessment.                               Attend at least 50% of education lectures provided.                               Weight loss of 5lbs by completion of program.    Nutrition Interventions/Education:   AHA handout \"What is angina\"? And \"What is Cardiac Rehabilitation\"?  Lipid profile reviewed  Referral to Outpatient Nutrition Therapy. Patient states her insurance company has a Dietitian that she sets up an appointment with.    30 Day Reassessment 9/25/2024    Exercise Assessment    No  Mode: NA  Frequency: NA  Duration: NA    Exercise Prescription     Exercise Prescription based on: Duke Activity Status Index (DASI) and assessent    DASI Score: 23.45  MET Score: 2.8   Frequency:  3 days/week   Mode: Treadmill, Nu-step,/Nu-step (T),Airdyne,Arm Ergometer   Duration: 32 total aerobic minutes   Intensity: RPE 11-15  Target HR:  To be calculated after 6 attended sessions.  MET Level: 2.8  Patient wears supplemental O2: No     Modality Workload METs Duration (minutes)   1 Pre-Exercise/Rest   5:00     2 NuStep 4000 58 colon, Level 2  2.8 12 :00   3 NuStep T5 44 colon, Level 2  2.8 12 :00   4 Treadmill 2.2 mph   2.8 12 :00   5 Airdyne 0.8 Load,  20-25 rpm's  2.7 12 :00   6 Arm Ergometer 16 Colon, Level 1 2.2 12:00     7 Post-Exercise/Rest   5:00       Resistance Training:  NA  Home Exercise " Prescription given: To be given prior to discharge from program.    Exercise Plan    Goal Status:  In Progress    Exercise Goals: Obtain 150 minutes/week of moderate intensity aerobic exercise                             Exercise frequency 5-7 days/week.  Exercise Interventions/Education:   Exercise progression:  To increase Met level by 5-10% each week.  To increase total exercise duration to 30-45 minutes.    30 Day Reassessment 9/25/2024    Other Core Components/Risk Factor Assessment:    Medication adherence  Current Medications:   Allergies: Adhesive tape; itching, blisters, skin peeling                  Cephalexin; face turns bright red and feels like on fire                  Silicone ?  Medication Documentation Review Audit       Reviewed by Aylin Srivastava on 08/09/24 at 1101      Medication Order Taking? Sig Documenting Provider Last Dose Status   Advair -21 mcg/actuation inhaler [id number] Yes  Historical Provider, MD Taking Active   albuterol 2.5 mg /3 mL (0.083 %) nebulizer solution [id number] Yes INHALE 1 VIAL VIA NEBULIZER EVERY 4 HOURS AS NEEDED FOR WHEEZING  SHORTNESS OF BREATH  OR COUGH Historical Provider, MD Taking Active   aMILoride (Midamor) 5 mg tablet [id number] Yes Take 1 tablet (5 mg) by mouth once daily. Historical Provider, MD Taking Active   aspirin 81 mg EC tablet [id number] Yes Take 1 tablet (81 mg) by mouth once daily. Historical Provider, MD Taking Active   azelastine (Astelin) 137 mcg (0.1 %) nasal spray [id number] Yes Administer 1 spray into affected nostril(s) 2 times a day. Historical Provider, MD Taking Active   benzonatate (Tessalon) 100 mg capsule [id number]  Take by mouth. Historical Provider, MD  Active   benzoyl peroxide (Benzac AC) 10 % external wash [id number] Yes Apply 1 Application topically once daily. Olivia Moreno, APRN-CNP Taking Active   busPIRone (Buspar) 15 mg tablet [id number] Yes Take 1 tablet (15 mg) by mouth every 12 hours. Historical Provider, MD  Active    cholecalciferol (Vitamin D-3) 25 MCG (1000 UT) tablet [id number] Yes Take 1 tablet (25 mcg) by mouth once daily. Historical Provider, MD Taking Active   clindamycin (Cleocin T) 1 % lotion [id number] Yes Apply topically 2 times a day. Olivia Moreno APRN-CNP Taking Active   clobetasol (Temovate) 0.05 % ointment [id number] Yes APPLY 1 APPLICATION 2 TIMES PER DAY AS NEEDED FOR 14 DAYS Historical Provider, MD Taking Active   clonazePAM (KlonoPIN) 1 mg tablet [id number] Yes Take 0.5 tablets (0.5 mg) by mouth once daily. Historical Provider, MD Taking Active   clopidogrel (Plavix) 75 mg tablet [id number]  Take 1 tablet (75 mg) by mouth once daily. Vinicius Swan DO  Active     Discontinued 08/02/24 1620   fenofibrate (Tricor) 145 mg tablet [id number] Yes TAKE ONE TABLET BY MOUTH EVERY DAY Vinicius Swan DO Taking Active   icosapent ethyL (Vascepa) 1 gram capsule [id number] Yes Take 2 capsules (2 g) by mouth 2 times a day. Viniicus Swan DO Taking Active   lamoTRIgine (LaMICtal) 100 mg tablet [id number] No Take 1 tablet (100 mg) by mouth once daily. Parminder Reza MD Not Taking Active   metoprolol succinate XL (Toprol-XL) 25 mg 24 hr tablet [id number] Yes Take 0.5 tablets (12.5 mg) by mouth once daily. Vinicius Swan DO Taking Active   nicotine (Nicoderm CQ) 14 mg/24 hr patch [id number] Yes  Historical Provider, MD Taking Active   nitroglycerin (Nitrostat) 0.4 mg SL tablet [id number] Yes Place 1 tablet (0.4 mg) under the tongue every 5 minutes if needed for chest pain. May repeat dose every 5 minutes for up to 3 doses total. Vinicius Swan DO Taking Active   nystatin (Mycostatin) 100,000 unit/mL suspension [id number] No Take 5 mL (500,000 Units) by mouth 4 times a day. Historical Provider, MD Not Taking Active   potassium chloride CR 10 mEq ER tablet [id number] Yes Take by mouth. TAKE 1 TABLET BY MOUTH ONCE DAILY WITH FOOD FOR 90 DAYS Historical Provider, MD Taking Active   Repatha SureClick 140 mg/mL  injection [id number] Yes Inject 1 mL (140 mg) under the skin every 14 (fourteen) days. Vinicius Swan DO Taking Active   Spiriva Respimat 2.5 mcg/actuation inhaler [id number] Yes Inhale 2 puffs once daily. Historical Provider, MD Taking Active   Tc-99m tetrofosmin (Myoview) injection 10.1 millicurie [id number]   Marily Jasmine MD  Active   Tc-99m tetrofosmin (Myoview) injection 30.5 millicurie [id number]   Vinicius Swan DO  Active   ticagrelor (Brilinta) 90 mg tablet [id number] Yes Take 1 tablet (90 mg) by mouth 2 times a day. Vinicius Swan DO Taking Active   Ventolin HFA 90 mcg/actuation inhaler [id number] Yes Inhale 2 puffs every 4 hours if needed. Historical Provider, MD Taking Active   Xhance 93 mcg/actuation aerosol breath activated [id number] Yes Use 1 puff twice daily to bilateral sides Historical Provider, MD Taking Active                 PATIENT STATES THE PLAVIX WAS DISCONTINUED AND SHE WAS STARTED ON BRILINTA 90MG TWICE DAILY, AND IS NOT USING THE NICODERM PATCH AND NO MENTION OF MYOVIEW INJECTIONS.                Medication compliance: Yes   Uses pill box/organizer: Yes    Carries medication list: Yes     Blood Pressure Management  History of Hypertension: No   Medication Changes: Yes PLAVIX DISCONTINUED AND BRILINTA 90MG TWICE DAILY STARTED.  VITALS TAKEN AT OFFICE VISIT ON 8/9/24:  /70 Lt. Arm  SpO2 96%  Pulse 95  Wt. 151.3    Heart Failure Management  Hx of Heart Failure: No    Smoking/Tobacco Assessment  Social History     Tobacco Use   Smoking Status Every Day    Types: Cigarettes   Smokeless Tobacco Never   Smokes 10 cigarettes/day. Patient states she is not ready to set tobacco cessation date.  Anyone in the home smoke: NO  Other Core Component Plan    Goal Status: In Progress    Other Core Component Goals: Achieve resting BP of < 130/80 by discharge and Abstaining from tobacco 2 hours prior to class, knowledge on medication usage and actions by discharge, patient carries updated list  "of medications with them at all times,refer to tobacco cessation program, set tobacco quit date while enrolled in the program, smoking cessation by completion of program.    Other Core Component Interventions/Education:   # of cigarettes smoked = 10 per day, BP readings taken pre and post exercise at each session.  Patient continues to smoke/quit goal and reasons for continuing discussed: Patient states she is not ready to set quit date yet. Patient given [facility] educational handout \"How Do I Manage My Medications\"?, Patient given \"How To be a Quitter\" Smoking cessation booklet.     30 Day Reassessment 9/25/2024     Educational Classes:   Cardiac Medications 8/28/2024, Stents 9/4/2024, Stress Testing 9/11/2024    Individual Patient Goals:    Establish home exercise program 3-5 days/week for 30-60 minutes daily by completion of program.  Become comfortable with my exercise program by completion of program.  Weight loss of 5lbs by completion of program.      Staff Comments:  Patient has done well in the program so far. They continue to show regular attendance and ability to handle increased workloads. No issues or complaints to note. Will continue to monitor and progress as they can tolerate.    Goal Status: In Progress    PHYSICIAN REVIEW AND RESPONSE:  Please check appropriate boxes and sign    __X_ The participant may enroll in the Cardiac Rehabilitation Program with the above individualized treatment plan (ITP)  ____ Defer exercise guidelines and outcomes per department policy and protocol  ____ Make the following changes to the ITP/Exercise Prescription:      Rehab Staff Signature: Rosamaria Renteria RN              "

## 2024-09-25 ENCOUNTER — CLINICAL SUPPORT (OUTPATIENT)
Dept: CARDIAC REHAB | Facility: HOSPITAL | Age: 49
End: 2024-09-25
Payer: COMMERCIAL

## 2024-09-25 DIAGNOSIS — Z95.5 S/P CORONARY ARTERY STENT PLACEMENT: ICD-10-CM

## 2024-09-25 PROCEDURE — 93798 PHYS/QHP OP CAR RHAB W/ECG: CPT | Performed by: INTERNAL MEDICINE

## 2024-09-30 ENCOUNTER — CLINICAL SUPPORT (OUTPATIENT)
Dept: CARDIAC REHAB | Facility: HOSPITAL | Age: 49
End: 2024-09-30
Payer: COMMERCIAL

## 2024-09-30 DIAGNOSIS — Z95.5 S/P CORONARY ARTERY STENT PLACEMENT: ICD-10-CM

## 2024-09-30 PROCEDURE — 93798 PHYS/QHP OP CAR RHAB W/ECG: CPT | Performed by: INTERNAL MEDICINE

## 2024-09-30 NOTE — PROGRESS NOTES
Cardiac Rehabilitation {UH CR/NE/VR NOTE TYPE:15297}    Name: Raegan Perez  Medical Record Number: [id number]  YOB: 1975  Age: 49 y.o.    Today’s Date: 9/30/2024  Primary Care Physician: Bao Thibodeaux DO  Referring Physician: Vinicius Swan DO  Program Location: [facility] CARDIAC REHAB     General  Primary Diagnosis:   1. S/P coronary artery stent placement  Follow Up In Cardiac Rehab         Onset/Date of Diagnosis: ***    {UH CR/NE/VR Session Number:12474}    AACVPR Risk Stratification:       Falls Risk: {UH CR/NE/VR FALLS RISK:71809}  Psychosocial Assessment     No data recorded    Sent PH-Q 9 to MD if score > 20: {UH CR/NE/VR PHQ-9 SENT TO MD:74617}    Pt reported/currently experiencing stress: {UH CR/NE/VR Reported Stress:18280}  Patient uses stress management skills: {YES/NO:200010}  History of: {Hx Anxiety/Depression:27533}  Currently seeing a mental health provider: {Yes/No:44849}  Social Support: {Yes/No:46861}  Quality of Life Survey: {UH CR/NE/VR QOLS:82886}  Learning Assessment:  Learning assessment/barriers: {Assessment/barriers:12557}  Preferred learning method: {Preferred learning method:61654}  Barriers: {Barriers to Education:33394}  Comments:    Stages of Change:{Stages of change:97605}    Psychosocial Plan    Goal Status: {UH CR/NE/VR Goal Status:99656}    Psychosocial Goals: {UH CR/NE/VR PSYCHOSOCIAL GOALS:92515}    Psychosocial Interventions/Education: {UH CR/NE/VR To be done in Cardiac Rehab:03398}    {UH AMB CR/NE/VR Psychosocial Initial Reassess Discharge:91162}    Nutrition Assessment:    Hyperlipidemia: {YES/NO:200010}    Lipids:   Lab Results   Component Value Date    CHOL 311 (H) 06/05/2024    HDL 29.4 06/05/2024    LDLF 29 04/14/2023    TRIG 637 (H) 06/05/2024       Current Dietary Guidelines: {Dietary Guidelines:49598:x}  Barriers to dietary change: {response; yes (wildcard)/no:397537}    Diet Habit Survey: Picture Your Plate  Pre: {UH CR/NE/VR PYP  Initial:90502}  Post: { CR/DC/VR PYP Post:02605}    Diabetes Assessment    Lab Results   Component Value Date    HGBA1C 5.7 05/06/2024       History of Diabetes: { CR/DC/VR DM ASSESSMENT:08131}    Weight Management       No data recorded    Nutrition Plan    Goal Status: { CR/DC/VR Goal Status:68857}    Nutrition Goals: { CR/DC/VR Nutrition Goals:03272}    Nutrition Interventions/Education:   {UH CR/DC/VR To be done in Cardiac Rehab:75945}    { AMB CR/DC/VR Nutrition Initial Reassess Discharge:18883}    Exercise Assessment    { CR/DC/VR Current Home Exercise:67025}    Exercise Prescription     Exercise Prescription based on: { CR/DC/VR ExRx Evaluation:14952}   Frequency:  { CR/DC/VR Session Frequency:76352} days/week   Mode: {Mode:30402}   Duration: *** total aerobic minutes   Intensity: RPE ***  Target HR:  { CR/DC/VR Target HR:77715}  MET Level: ***  Patient wears supplemental O2: { CR/DC/VR Supplemental O2:89600}     Modality Workload METs Duration (minutes)   1 Pre-Exercise      2 { CR/DC/VR Exercise Modality:50251} ***  *** *** :00   3 { CR/DC/VR Exercise Modality:96744} ***  *** *** :00   4 { CR/DC/VR Exercise Modality:37116} ***  *** *** :00   5 { CR/DC/VR Exercise Modality:63003} ***  *** *** :00   6 Post-Exercise        Resistance Training: {YES/NO:371614}  Home Exercise Prescription given: { CR/DC/VR Home Ex Rx:02314}    Exercise Plan    Goal Status: { CR/DC/VR Goal Status:93907}    Exercise Goals: { CR/DC/VR Exercise Goals:90089}    Exercise Interventions/Education:   { CR/DC/VR To be done in Cardiac Rehab:14672}    { AMB CR/DC/VR Exercise Initial Reassess Discharge:77694}    Other Core Components/Risk Factor Assessment:    Medication adherence  Current Medications:   Medication Documentation Review Audit       Reviewed by Aylin Srivastava on 08/09/24 at 1101      Medication Order Taking? Sig Documenting Provider Last Dose Status   Advair -21 mcg/actuation  inhaler [id number] Yes  Historical Provider, MD Taking Active   albuterol 2.5 mg /3 mL (0.083 %) nebulizer solution [id number] Yes INHALE 1 VIAL VIA NEBULIZER EVERY 4 HOURS AS NEEDED FOR WHEEZING  SHORTNESS OF BREATH  OR COUGH Historical MD [place] Taking Active   aMILoride (Midamor) 5 mg tablet [id number] Yes Take 1 tablet (5 mg) by mouth once daily. Parminder Provider, MD Taking Active   aspirin 81 mg EC tablet [id number] Yes Take 1 tablet (81 mg) by mouth once daily. Historical Provider, MD Taking Active   azelastine (Astelin) 137 mcg (0.1 %) nasal spray [id number] Yes Administer 1 spray into affected nostril(s) 2 times a day. Historical Provider, MD Taking Active   benzonatate (Tessalon) 100 mg capsule [id number]  Take by mouth. Historical Provider, MD  Active   benzoyl peroxide (Benzac AC) 10 % external wash [id number] Yes Apply 1 Application topically once daily. VÍCTOR Wayne Taking Active   busPIRone (Buspar) 15 mg tablet [id number] Yes Take 1 tablet (15 mg) by mouth every 12 hours. Historical Provider, MD  Active   cholecalciferol (Vitamin D-3) 25 MCG (1000 UT) tablet [id number] Yes Take 1 tablet (25 mcg) by mouth once daily. Historical Provider, MD Taking Active   clindamycin (Cleocin T) 1 % lotion [id number] Yes Apply topically 2 times a day. VÍCTOR Wayne Taking Active   clobetasol (Temovate) 0.05 % ointment [id number] Yes APPLY 1 APPLICATION 2 TIMES PER DAY AS NEEDED FOR 14 DAYS Historical MD [place] Taking Active   clonazePAM (KlonoPIN) 1 mg tablet [id number] Yes Take 0.5 tablets (0.5 mg) by mouth once daily. Historical Provider, MD Taking Active   clopidogrel (Plavix) 75 mg tablet [id number]  Take 1 tablet (75 mg) by mouth once daily. Vinicius Swan DO  Active     Discontinued 08/02/24 1620   fenofibrate (Tricor) 145 mg tablet [id number] Yes TAKE ONE TABLET BY MOUTH EVERY DAY Vinicius Swan DO Taking Active   icosapent ethyL (Vascepa) 1 gram capsule [id number] Yes Take 2 capsules  (2 g) by mouth 2 times a day. Vinicius Swan DO Taking Active   lamoTRIgine (LaMICtal) 100 mg tablet [id number] No Take 1 tablet (100 mg) by mouth once daily. Historical Provider, MD Not Taking Active   metoprolol succinate XL (Toprol-XL) 25 mg 24 hr tablet [id number] Yes Take 0.5 tablets (12.5 mg) by mouth once daily. Vinicius Swan DO Taking Active   nicotine (Nicoderm CQ) 14 mg/24 hr patch [id number] Yes  Historical Provider, MD Taking Active   nitroglycerin (Nitrostat) 0.4 mg SL tablet [id number] Yes Place 1 tablet (0.4 mg) under the tongue every 5 minutes if needed for chest pain. May repeat dose every 5 minutes for up to 3 doses total. Vinicius Swan DO Taking Active   nystatin (Mycostatin) 100,000 unit/mL suspension [id number] No Take 5 mL (500,000 Units) by mouth 4 times a day. Historical Provider, MD Not Taking Active   potassium chloride CR 10 mEq ER tablet [id number] Yes Take by mouth. TAKE 1 TABLET BY MOUTH ONCE DAILY WITH FOOD FOR 90 DAYS Historical Provider, MD Taking Active   Repatha SureClick 140 mg/mL injection [id number] Yes Inject 1 mL (140 mg) under the skin every 14 (fourteen) days. Vinicius Swan DO Taking Active   Spiriva Respimat 2.5 mcg/actuation inhaler [id number] Yes Inhale 2 puffs once daily. Historical Provider, MD Taking Active   Tc-99m tetrofosmin (Myoview) injection 10.1 millicurie [id number]   Marily Jasmine MD  Active   Tc-99m tetrofosmin (Myoview) injection 30.5 millicurie [id number]   Vinicius Swan DO  Active   ticagrelor (Brilinta) 90 mg tablet [id number] Yes Take 1 tablet (90 mg) by mouth 2 times a day. Vinicius Swan DO Taking Active   Ventolin HFA 90 mcg/actuation inhaler [id number] Yes Inhale 2 puffs every 4 hours if needed. Historical Provider, MD Taking Active   Xhance 93 mcg/actuation aerosol breath activated [id number] Yes Use 1 puff twice daily to bilateral sides Historical Provider, MD Taking Active                                 Medication compliance: {UH  GEN YES NO WILDCARD WILDCARD:20290}   Uses pill box/organizer: {YES/NO:200010}   Carries medication list: {YES/NO:200010}    Blood Pressure Management  History of Hypertension: {YES/NO:200010}  Medication Changes: {YES/NO:200010}  Resting BP:  There were no vitals taken for this visit.     Heart Failure Management  Hx of Heart Failure: {UH CR/KY/VR HF Management:22869}    Smoking/Tobacco Assessment  Social History     Tobacco Use   Smoking Status Every Day    Types: Cigarettes   Smokeless Tobacco Never       Other Core Component Plan    Goal Status: {UH CR/KY/VR Goal Status:67590}    Other Core Component Goals: {UH CR/KY/VR Other Goals:24896}    Other Core Component Interventions/Education:   {UH CR/KY/VR Other Goals:63633}    {UH AMB CR/KY/VR Other Core Initial Reassess Discharge:10313}    Individual Patient Goals:    ***  ***    Goal Status: {UH CR/KY/VR Goal Status:73446}    Staff Comments:  ***    Rehab Staff Signature: Rosamaria Renteria RN

## 2024-10-01 ENCOUNTER — TELEPHONE (OUTPATIENT)
Dept: CARDIOLOGY | Facility: CLINIC | Age: 49
End: 2024-10-01
Payer: COMMERCIAL

## 2024-10-01 DIAGNOSIS — E78.00 PURE HYPERCHOLESTEROLEMIA: ICD-10-CM

## 2024-10-01 NOTE — TELEPHONE ENCOUNTER
Spoke with pt  She uses [facility] for her pharmacy and repatha is a $0 Copay  Called  speciality to cancel praulent and she will continue with repatha

## 2024-10-01 NOTE — TELEPHONE ENCOUNTER
Patient called need refill for Repatha sent to the [facility] in [place] . Stated there is another medication for Praluent pen 75 mg that was sent to  pharmacy said they are billing her incorrectly  for this med plus said she doesn't need to Cholesterol medication . Do not send to   pharmacy . Said she doesn't want to run out of medication  . Give patient a call .

## 2024-10-02 ENCOUNTER — CLINICAL SUPPORT (OUTPATIENT)
Dept: CARDIAC REHAB | Facility: HOSPITAL | Age: 49
End: 2024-10-02
Payer: COMMERCIAL

## 2024-10-02 DIAGNOSIS — Z95.5 S/P CORONARY ARTERY STENT PLACEMENT: ICD-10-CM

## 2024-10-02 PROCEDURE — 93798 PHYS/QHP OP CAR RHAB W/ECG: CPT | Performed by: INTERNAL MEDICINE

## 2024-10-03 ENCOUNTER — OFFICE VISIT (OUTPATIENT)
Dept: OTOLARYNGOLOGY | Facility: CLINIC | Age: 49
End: 2024-10-03
Payer: COMMERCIAL

## 2024-10-03 VITALS
RESPIRATION RATE: 16 BRPM | SYSTOLIC BLOOD PRESSURE: 119 MMHG | WEIGHT: 153.25 LBS | OXYGEN SATURATION: 95 % | DIASTOLIC BLOOD PRESSURE: 73 MMHG | HEART RATE: 92 BPM | TEMPERATURE: 97.7 F | BODY MASS INDEX: 26.16 KG/M2 | HEIGHT: 64 IN

## 2024-10-03 DIAGNOSIS — L29.9 EAR ITCHING: ICD-10-CM

## 2024-10-03 DIAGNOSIS — J34.3 HYPERTROPHY OF BOTH INFERIOR NASAL TURBINATES: ICD-10-CM

## 2024-10-03 DIAGNOSIS — S09.92XA INJURY OF NOSE, INITIAL ENCOUNTER: ICD-10-CM

## 2024-10-03 DIAGNOSIS — J34.2 NASAL SEPTAL DEVIATION: ICD-10-CM

## 2024-10-03 DIAGNOSIS — J30.81 ALLERGIC RHINITIS DUE TO ANIMAL HAIR AND DANDER: Primary | ICD-10-CM

## 2024-10-03 PROCEDURE — 3008F BODY MASS INDEX DOCD: CPT | Performed by: STUDENT IN AN ORGANIZED HEALTH CARE EDUCATION/TRAINING PROGRAM

## 2024-10-03 PROCEDURE — 99214 OFFICE O/P EST MOD 30 MIN: CPT | Performed by: STUDENT IN AN ORGANIZED HEALTH CARE EDUCATION/TRAINING PROGRAM

## 2024-10-03 RX ORDER — FLUTICASONE PROPIONATE 93 UG/1
1 SPRAY, METERED NASAL 2 TIMES DAILY
Qty: 16 ML | Refills: 11 | Status: SHIPPED | OUTPATIENT
Start: 2024-10-03

## 2024-10-03 RX ORDER — AZELASTINE 1 MG/ML
1 SPRAY, METERED NASAL 2 TIMES DAILY
Qty: 30 ML | Refills: 11 | Status: SHIPPED | OUTPATIENT
Start: 2024-10-03

## 2024-10-03 SDOH — ECONOMIC STABILITY: FOOD INSECURITY: WITHIN THE PAST 12 MONTHS, THE FOOD YOU BOUGHT JUST DIDN'T LAST AND YOU DIDN'T HAVE MONEY TO GET MORE.: NEVER TRUE

## 2024-10-03 SDOH — ECONOMIC STABILITY: FOOD INSECURITY: WITHIN THE PAST 12 MONTHS, YOU WORRIED THAT YOUR FOOD WOULD RUN OUT BEFORE YOU GOT MONEY TO BUY MORE.: NEVER TRUE

## 2024-10-03 ASSESSMENT — PAIN SCALES - GENERAL: PAINLEVEL: 0-NO PAIN

## 2024-10-03 ASSESSMENT — COLUMBIA-SUICIDE SEVERITY RATING SCALE - C-SSRS
2. HAVE YOU ACTUALLY HAD ANY THOUGHTS OF KILLING YOURSELF?: NO
1. IN THE PAST MONTH, HAVE YOU WISHED YOU WERE DEAD OR WISHED YOU COULD GO TO SLEEP AND NOT WAKE UP?: NO
6. HAVE YOU EVER DONE ANYTHING, STARTED TO DO ANYTHING, OR PREPARED TO DO ANYTHING TO END YOUR LIFE?: NO

## 2024-10-03 ASSESSMENT — LIFESTYLE VARIABLES
HOW OFTEN DO YOU HAVE A DRINK CONTAINING ALCOHOL: NEVER
AUDIT-C TOTAL SCORE: 0
SKIP TO QUESTIONS 9-10: 1
HOW MANY STANDARD DRINKS CONTAINING ALCOHOL DO YOU HAVE ON A TYPICAL DAY: PATIENT DOES NOT DRINK
HOW OFTEN DO YOU HAVE SIX OR MORE DRINKS ON ONE OCCASION: NEVER

## 2024-10-03 ASSESSMENT — ENCOUNTER SYMPTOMS
DEPRESSION: 1
OCCASIONAL FEELINGS OF UNSTEADINESS: 1
LOSS OF SENSATION IN FEET: 1

## 2024-10-03 NOTE — PROGRESS NOTES
SUBJECTIVE  Patient ID: Raegan Perez is a 49 y.o. female who presents for Follow-up.    History: 45 year-old female self-referred for evaluation of multiple concerns.     She reports longstanding issues with her tonsils. She notes frequent pharyngitis and tonsil stones. She uses a water pik to remove these. This is not her primary concern. She notes general sore throat and odynophagia. More worried occasionally choke on her own saliva. Does not choke frequently on food/drink.     She also notes bilateral otalgia. She notes left-sided otalgia but this has now developed right-sided otalgia. Seems to radiate down the jaw to her submandibular glands and superior SCM. Notes some itching in the ear canals. Not sure whether she has hearing loss. Takes meclizine as needed for vertigo. Notes bilateral tinnitus following popping of ears.     She notes longstanding post-nasal drainage. Notes malar pressure; gets occasional frontal pain. Notes some nasal congestion. No nasal obstruction. Thinks she has had a persistent infection for the last half year, at least. Notes that she was previously using Flonase and azelastine. Uses Netiport and Navage. Last antibiotic was at least several months ago. Had previous allergy testing in [place] (positive for cats, dogs, unknown outdoor plants) but nothing in the last 5 years since moving back to [place].     Update 10/19/2022:  Follow-up today with multiple concerns.  1. Notes that her throat symptoms are relatively stable. However, in the last week she has appreciated some difficulty with swallowing. She denies odynophagia. No hemoptysis nor sore throat. Seems to have been prompted by gagging at a dentist visit a week ago.  2. She notes that she has had continued post-nasal drainage. She has been using twice daily irrigations (Navage), Xhance, and Astelin. Takes an OTC antihistamine as needed.  3. She is noting some right-sided SMG swelling irritation.     Update  2023:  Follow-up today for multiple concerns:  1. She notes persistent nasal symptoms; she has been using Xhance, Astelin, and irrigations twice a day. She notes persistent post-nasal drainage. She is noting persistent congestion/obstruction.  2. She is concerned about a bump on the inside of the right nostril.  3. She is noting persistent swelling of the glands but this has improved.     Update 10/3/2024: Had a MI summer 2024. Her father .  Notes that she has persistent nasal congestion and post-nasal drainage. She did have to put her cat down. Still using Xhance BID, azelastine as needed, and irrigations with Navage as needed.  Reports that last year she had an incident where a cooler fell on her nose; has a scar.  She is noting ear itching. Reports a history of eczema at other    OBJECTIVE  Physical Exam  CONSTITUTIONAL: Well appearing female who appears stated age.  VOICE: Clear speech without hoarseness. No stridor nor stertor.  RESPIRATORY: Normal inspiration and expiration and chest wall expansion; no use of accessory muscles to breathe.  PSYCHIATRIC: Alert, appropriate mood and affect.   HEAD, FACE, AND SKIN: Symmetric facial feature. No cutaneous masses or lesions were visualized.  EYES: No enopthalmos. EOMI.  EARS: Narrow ear canals, clear. TMs are intact and in a neutral position.  NOSE: Nasal dorsum was midline. Anterior rhinoscopy was limited to the heads of the inferior turbinates. There is an S-shaped deviation to the septum; to the right superiorly into the left inferiorly. There is moderate inferior turbinate hypertrophy bilaterally. There is an increase in clear drainage bilaterally.  OROPHARYNX: No lesion nor mucosal abnormality. The uvula was normal appearing. The tonsils were 1-2+ and cryptic. No drainage.    ASSESSMENT/PLAN  Diagnoses and all orders for this visit:  Allergic rhinitis due to animal hair and dander  -     Xhance 93 mcg/actuation aerosol breath activated; 1 puff by  intranasal route 2 times a day. Use 1 puff twice daily to bilateral sides  -     azelastine (Astelin) 137 mcg (0.1 %) nasal spray; Administer 1 spray into each nostril 2 times a day.  Ear itching  Nasal septal deviation  Hypertrophy of both inferior nasal turbinates    49 y.o. female  with symptoms of postnasal drainage, nasal congestion, and clinical findings consistent with chronic rhinitis in the setting of environmental allergies. She also notes a history of chronic tonsillitis with tonsil stones and ear pain concerning for possible TMJ dysfunction    0.  Nasal trauma  The patient reports that roughly a year ago she had significant nasal trauma.  She denied any imaging nor evaluation for concern of broken nose.  She has noted some change in nasal congestion on the left although this is fairly mild.  She has not noted change in nasal shape.  On my exam the nasal dorsum appears relatively midline and I appreciate no step-offs on exam.  I do not appreciate any enophthalmos or limitation of extraocular muscle movement.  Offered reassurance.     1. Chronic Rhinitis/allergic rhinitis/postnasal drainage/NSD/ITH  On initial nasal endoscopy the patient had generalized mucosal edema throughout the nasal cavity as well as thick drainage. On prior repeat endoscopy the patient's edema had significantly improved but she remained symptomatic. She has now seen allergy and had confirmation of previously noted cat allergy. She previously lived with a cat (passed January 2024). She has recent imaging of her neck which demonstrates clear paranasal sinuses.     At this time I believe that her symptoms are consistent with persistent allergic rhinitis. She has been relatively stable with use of Xhance, azelastine, and sinus irrigations.  It has been some years since her last visit and she was beginning to run out of medications.  She has noted that nasal congestion has worsened with decreased use of azelastine nasal spray.  She was  therefore interested in continuing nasal sprays.  We have previously discussed procedural intervention but she is not interested at this time.  In addition, she notes that she recently had a heart attack complicating her medications and anesthesia risk.    We will continue to have her follow-up yearly.     2. Acid reflux, throat clicking  Complicating the patient's above symptoms, she also described persistent acid reflux symptoms which have been chronic. She had a recent upper endoscopy which demonstrated some mild gastritis and a hiatal hernia. Thankfully, she did not have any significant pathology. She does not take any medication for this indication.     3. Chronic tonsillitis/tonsil stones  The patient has multiple comorbidities and is on anticoagulation. She is not interested in tonsillectomy at this time. We previously discussed continued use of conservative medical therapy to help manage tonsil stones.     4. Ear pain/itching  On physical exam I see no ear pathology to suggest the etiology for her pain. I suspect that this may be related to TMJ dysfunction.  For ear itching we discussed use of over-the-counter skin moisturizers versus steroid creams to help manage what sounds like a chronic eczematoid otitis externa.     5. Suspected right submandibular sialolithiasis versus sialadenitis  The patient reports several episodes of recent right neck swelling at moments of eating. More recently she is also noted occasional left submandibular gland swelling. On imaging I appreciate no concerning sialolithiasis. Given that she has not had a severe infection, I recommended continued observation and treatment as needed. If she continues to have symptoms would consider referral for sialendoscopy.     6. Right lower lip lesion, suspected mucous retention cyst versus vascular malformation  On exam the patient has a small mobile lesion along the mucosal surface of the right lower lip. I suspect that this is a small  mucous retention cyst which seems consistent with a translucent nature of the overlying mucosa. There is a slight violaceous hue to this area which may indicate a vascular malformation but I think that this is less likely. We discussed her options at this time which include continued observation, aspiration, or surgical excision. At this time she wishes to observe.     7. Concern for sleep apnea, gasping for air at night  The patient initially presented with concerns for gasping for air at night. We obtained a home sleep study which demonstrated no definitive sleep apnea. Unfortunately she has noted persistent trouble with gasping for air and has appreciated desaturations at home. She was previously scheduled for in-lab PSG.    This note was created using speech recognition transcription software. Despite proofreading, typographical errors may be present that affect the meaning of the content. Please contact my office with any questions.

## 2024-10-04 ENCOUNTER — CLINICAL SUPPORT (OUTPATIENT)
Dept: CARDIAC REHAB | Facility: HOSPITAL | Age: 49
End: 2024-10-04
Payer: COMMERCIAL

## 2024-10-04 DIAGNOSIS — Z95.5 S/P CORONARY ARTERY STENT PLACEMENT: ICD-10-CM

## 2024-10-04 PROCEDURE — 93798 PHYS/QHP OP CAR RHAB W/ECG: CPT | Performed by: INTERNAL MEDICINE

## 2024-10-16 ENCOUNTER — CLINICAL SUPPORT (OUTPATIENT)
Dept: CARDIAC REHAB | Facility: HOSPITAL | Age: 49
End: 2024-10-16
Payer: COMMERCIAL

## 2024-10-16 DIAGNOSIS — Z95.5 S/P CORONARY ARTERY STENT PLACEMENT: ICD-10-CM

## 2024-10-16 PROCEDURE — 93798 PHYS/QHP OP CAR RHAB W/ECG: CPT | Performed by: INTERNAL MEDICINE

## 2024-10-18 ENCOUNTER — CLINICAL SUPPORT (OUTPATIENT)
Dept: CARDIAC REHAB | Facility: HOSPITAL | Age: 49
End: 2024-10-18
Payer: COMMERCIAL

## 2024-10-18 DIAGNOSIS — Z95.5 S/P CORONARY ARTERY STENT PLACEMENT: ICD-10-CM

## 2024-10-18 PROCEDURE — 93798 PHYS/QHP OP CAR RHAB W/ECG: CPT | Performed by: INTERNAL MEDICINE

## 2024-10-21 ENCOUNTER — CLINICAL SUPPORT (OUTPATIENT)
Dept: CARDIAC REHAB | Facility: HOSPITAL | Age: 49
End: 2024-10-21
Payer: OTHER GOVERNMENT

## 2024-10-21 DIAGNOSIS — Z95.5 S/P CORONARY ARTERY STENT PLACEMENT: ICD-10-CM

## 2024-10-21 PROCEDURE — 93798 PHYS/QHP OP CAR RHAB W/ECG: CPT | Performed by: INTERNAL MEDICINE

## 2024-10-22 NOTE — PROGRESS NOTES
Cardiac Rehabilitation  Individual  Treatment  Plan   -   60 Day Reassessment 10/24/2024    Name: Raegan Perez  Medical Record Number: [id number]  YOB: 1975  Age: 49 y.o.    Today’s Date: 10/22/2024  Primary Care Physician: Bao Thibodeaux DO  Referring Physician: Vinicius Swan DO  Program Location: [facility] CARDIAC REHAB     General  Primary Diagnosis: PCI  1. S/P coronary artery stent placement  Follow Up In Cardiac Rehab       2.     PCI    Onset/Date of Diagnosis: 7/31/24       CR/TN/VR Session # attended:   15 Sessions Attended.  Patient has intermittent Attendance    AACVPR Risk Stratification:   High    Falls Risk: Low    Psychosocial Assessment     Pre PH-Q 9 survey score:  11  Post PH-Q9 survey score: To be done at disharge.    Sent PH-Q 9 to MD if score > 20: N/A    Pt reported/currently experiencing stress: Yes; Anxiety; Severity: mild and Depression; Severity: mild  Patient uses stress management skills: Yes   History of:  Anxiety and depression  Currently seeing a mental health provider: Yes, Then provider name: Dr. Hector Dominguez  Social Support: Yes, Whom:Spouse, daughter  Quality of Life Survey: SF-36   SF-36 Pre Post   Physical Component Score 38.09 TBD   Mental Component Score 32.36 TBD   General Health Score    28.15 TBD           Learning Assessment:  Learning assessment/barriers: Emotional  Preferred learning method: Auditory, Visual, and Writing handout  Barriers: None  Comments: Patient is ready and willing to learn    Knowledge Assessment /Survey:  Pre survey score:  9     Post survey score: TBD at discharge    Stages of Change: Action    Psychosocial Plan    Goal Status: In Progress    Psychosocial Goals: Maintain or lower PH-Q 9 score by discharge and Maintain or improve Post SF-36 Quality of Life Survey.    Psychosocial Interventions/Education:   Patient follows curently with a Psychiatrist Dr. Hector Dominguez and is on Psychosocial medications for  "Bi-Polar disease per patient.  PHQ9 reviewed with patient     AMB CR/GA/VR Psychosocial  -  60 Day Reassessment: 10/24/2024  Patient has H/O Bi-Polar disorder    Nutrition Assessment:    Hyperlipidemia: Yes     Lipids:   Lab Results   Component Value Date    CHOL 311 (H) 06/05/2024    HDL 29.4 06/05/2024    LDLF 29 04/14/2023    TRIG 637 (H) 06/05/2024       Current Dietary Guidelines:  Regular diet  Barriers to dietary change: no    Diet Habit Survey: [place] Dietary Assessment  Pre survey score: 61%  Post survey score: To be done at discharge.    Diabetes Assessment    Lab Results   Component Value Date    HGBA1C 5.7 05/06/2024       History of Diabetes: No    Weight Management:    PRE - Weight 151.2 lbs          Ht: 64 inches      PRE - BMI:25.9  Waist Circ  PRE: 37.5 in    POST - Weight TBD lbs          Ht: 64 inches      POST- BMI:  TBD  Waist Circ  POST: TBD    Nutrition Plan    Goal Status: In Progress    Nutrition Goals:  Achieve a score of 80% or greater on Post [place] Dietary Assessment.                               Attend at least 50% of education lectures provided.                               Weight loss of 5lbs by completion of program.    Nutrition Interventions/Education:   AHA handout \"What is angina\"? And \"What is Cardiac Rehabilitation\"?  Lipid profile reviewed during initial phone interview  Referral to Outpatient Nutrition Therapy. Patient states her insurance company has a Dietitian that she sets up an appointment with.     AMB CR/GA/VR Nutrition  -  60 Day Reassessment 10/24/2024  Provided Healthy Eating Tip Sheets and reviewed areas with lowest scores for improvement with patient.    Exercise Assessment    None, Sedentary  Mode: NA  Frequency: NA  Duration: NA    Exercise Prescription     Exercise Prescription based on: Duke Activity Status Index (DASI) and Health History    DASI Score: 23.45  MET Score: 2.8  (Score divided by 2)     Frequency:  3 days/week   Mode: Treadmill, " Nu-step,/Nu-step (T),Airdyne,Arm Ergometer   Duration: 32 total aerobic minutes   Intensity: RPE 11-15  Target HR:  119-139  MET Level: 2.8 max starting  Patient wears supplemental O2: No     Modality Workload METs Duration (minutes)   1 Pre-Exercise/Rest -- -- 5:00   2 NuStep 4000 70 colon, Level 4  3.1 14 :00   3 NuStep T5 50 colon, Level 4  3.1 14:00   4 Treadmill 2.5 mph, 1% incline   3.3 14 :00   5 Airdyne 1.0 Load,  25-30 Rpm 3.1 14 :00   6 Arm Ergometer 18 Colon, Level 2 2.4 14:00   7 Post-Exercise/Rest -- -- 5:00     Resistance Training:  NA  Home Exercise Prescription given: yes      Exercise Plan    Goal Status:  In Progress    Exercise Goals: Obtain 150 minutes/week of moderate intensity aerobic exercise                             Exercise frequency 5-7 days/week.    Exercise progression:  Maintained exercise duration at 42 minutes.  No change to exercise intensity but reviewed, PT HR/RPE appropriate  Patient has intermittent attendance.    Exercise Interventions/Education:   Has been given bi-weekly rx reviewed with pt and ex log given    [facility] AMB CR/HI/VR Exercise  -  60 Day Reassessment:  10/24/2024  Patient has HX: of POTS with increased heart rate. Will progress slowly on TM for safety.      Other Core Components/Risk Factor Assessment:    Medication adherence    Current Medications:   Medication Documentation Review Audit       Reviewed by Ravin Corral MD (Physician) on 10/03/24 at 1005      Medication Order Taking? Sig Documenting Provider Last Dose Status   Advair -21 mcg/actuation inhaler [id number] Yes  Historical Provider, MD Taking Active   albuterol 2.5 mg /3 mL (0.083 %) nebulizer solution [id number] Yes INHALE 1 VIAL VIA NEBULIZER EVERY 4 HOURS AS NEEDED FOR WHEEZING  SHORTNESS OF BREATH  OR COUGH Historical Provider, MD Taking Active   alirocumab (Praluent Pen) 75 mg/mL pen injector [id number] No Inject 1 pen (75 mg) under the skin every 14 (fourteen) days.   Patient not taking:  Reported on 10/3/2024    Vinicius Swan, DO Not Taking Active   aMILoride (Midamor) 5 mg tablet [id number] Yes Take 1 tablet (5 mg) by mouth once daily. Historical Provider, MD Taking Active   aspirin 81 mg EC tablet [id number] Yes Take 1 tablet (81 mg) by mouth once daily. Historical Provider, MD Taking Active   Discontinued 10/03/24 0955   azelastine (Astelin) 137 mcg (0.1 %) nasal spray [id number]  Administer 1 spray into each nostril 2 times a day. Ravin Corral MD  Active   benzoyl peroxide (Benzac AC) 10 % external wash [id number] Yes Apply 1 Application topically once daily. VÍCTOR Wayne Taking Active   busPIRone (Buspar) 15 mg tablet [id number] Yes Take 1 tablet (15 mg) by mouth every 12 hours. Historical Provider, MD Taking Active   cholecalciferol (Vitamin D-3) 25 MCG (1000 UT) tablet [id number] Yes Take 1 tablet (25 mcg) by mouth once daily. Historical Provider, MD Taking Active   clindamycin (Cleocin T) 1 % lotion [id number] Yes Apply topically 2 times a day. VÍCTOR Wayne Taking Active   clobetasol (Temovate) 0.05 % ointment [id number] Yes APPLY 1 APPLICATION 2 TIMES PER DAY AS NEEDED FOR 14 DAYS Historical Provider, MD Taking Active   clonazePAM (KlonoPIN) 1 mg tablet [id number] Yes Take 0.5 tablets (0.5 mg) by mouth once daily. Historical Provider, MD Taking Active   clopidogrel (Plavix) 75 mg tablet [id number] No Take 1 tablet (75 mg) by mouth once daily.   Patient not taking: Reported on 10/3/2024    Vinicius Swan, DO Not Taking Active   evolocumab (Repatha SureClick) 140 mg/mL injection [id number] Yes Inject 1 mL (140 mg) under the skin every 14 (fourteen) days. Vinicius Swan, DO Taking Active   fenofibrate (Tricor) 145 mg tablet [id number] Yes TAKE ONE TABLET BY MOUTH EVERY DAY Vinicius Swan, DO Taking Active   icosapent ethyL (Vascepa) 1 gram capsule [id number] Yes Take 2 capsules (2 g) by mouth 2 times a day. Vinicius Swan, DO Taking Active   lamoTRIgine  (LaMICtal) 100 mg tablet [id number] Yes Take 1 tablet (100 mg) by mouth once daily. Historical Provider, MD Taking Active   metoprolol succinate XL (Toprol-XL) 25 mg 24 hr tablet [id number] Yes Take 0.5 tablets (12.5 mg) by mouth once daily. Vinicius Swan DO Taking Active   nicotine (Nicoderm CQ) 14 mg/24 hr patch [id number] Yes  Historical Provider, MD Taking Active   nitroglycerin (Nitrostat) 0.4 mg SL tablet [id number] Yes Place 1 tablet (0.4 mg) under the tongue every 5 minutes if needed for chest pain. May repeat dose every 5 minutes for up to 3 doses total. Vinicius Swan DO Taking Active   nystatin (Mycostatin) 100,000 unit/mL suspension [id number] Yes Take 5 mL (500,000 Units) by mouth 4 times a day. Historical Provider, MD Taking Active   potassium chloride CR 10 mEq ER tablet [id number] Yes Take by mouth. TAKE 1 TABLET BY MOUTH ONCE DAILY WITH FOOD FOR 90 DAYS Historical Provider, MD Taking Active   Spiriva Respimat 2.5 mcg/actuation inhaler [id number] Yes Inhale 2 puffs once daily. Historical Provider, MD Taking Active   Tc-99m tetrofosmin (Myoview) injection 10.1 millicurie [id number]   Marily Jasmine MD  Active   Tc-99m tetrofosmin (Myoview) injection 30.5 millicurie [id number]   Vinicius Swan DO  Active   ticagrelor (Brilinta) 90 mg tablet [id number] Yes Take 1 tablet (90 mg) by mouth 2 times a day. Vinicius Swan DO Taking Active   Ventolin HFA 90 mcg/actuation inhaler [id number] Yes Inhale 2 puffs every 4 hours if needed. Historical Provider, MD Taking Active   Discontinued 10/03/24 0955   Xhance 93 mcg/actuation aerosol breath activated [id number]  1 puff by intranasal route 2 times a day. Use 1 puff twice daily to bilateral sides Ravin Corral MD  Active                 PATIENT STATES THE PLAVIX WAS DISCONTINUED AND SHE WAS STARTED ON BRILINTA 90MG TWICE DAILY, AND IS NOT USING THE NICODERM PATCH AND NO MENTION OF MYOVIEW INJECTIONS.    Allergies: Adhesive tape; itching, blisters, skin  "peeling                  Cephalexin; face turns bright red and feels like on fire                  Silicone ?                Medication compliance: Yes   Uses pill box/organizer: Yes    Carries medication list: Yes     Blood Pressure Management  History of Hypertension: No   Medication Changes: Yes PLAVIX DISCONTINUED AND BRILINTA 90MG TWICE DAILY STARTED.    VITALS TAKEN AT OFFICE VISIT ON 8/9/24:    /70 Lt. Arm           SpO2 96%       Pulse 95    (See session reports for  ALL  V.S.  documented  during class sessions)      Heart Failure Management  Hx of Heart Failure: No      Smoking/Tobacco Assessment  Social History     Tobacco Use   Smoking Status Every Day    Types: Cigarettes   Smokeless Tobacco Never   Smokes 10 cigarettes/day. Patient states she is not ready to set tobacco cessation date.    Anyone in the home smoke: NO      Other Core Component Plan    Goal Status: In Progress    Other Core Component Goals: Achieve resting BP of < 130/80 by discharge and Abstaining from tobacco 2 hours prior to class, knowledge on medication usage and actions by discharge, patient carries updated list of medications with them at all times,refer to tobacco cessation program, set tobacco quit date while enrolled in the program, smoking cessation by completion of program.    Other Core Component Interventions/Education:   # of cigarettes smoked = 10 per day, BP readings taken pre and post exercise at each session.  Patient continues to smoke/quit goal and reasons for continuing discussed: Patient states she is not ready to set quit date yet. Patient given AHA educational handout \"How Do I Manage My Medications\"?, Patient given \"How To be a Quitter\" Smoking cessation booklet.  Pt attended education  on Cardiac Medications provided by the Pharmacists   Pt attended Stents and Stress Testing, Heart Attacks,      [facility] AMB CR/MI/VR Other Core Component  -  60 Day Reassessment:   10/24/2024       Educational Classes:   Cardiac " Medications 8/28/2024, Stents 9/4/2024, Stress Testing 9/11/2024, CAD Risk Factors 9/25/24, Fiber+Plant Based Eating (Dietitian) 10/2/24, Heart Attacks 10/16/24      Individual Patient Goals:    Establish home exercise program 3-5 days/week for 30-60 minutes daily by completion of program.  Goal Status: In Progress    Become comfortable with my exercise program by completion of program.  Goal Status: In Progress    Weight loss of 5lbs by completion of program.  Goal Status: In Progress      Staff Comments:  Patient has done well in the program so far. She has had intermittent attendance but shows the ability to handle increased workloads as previously changed. No issues or complaints to note. Will continue to monitor and progress as She can tolerate.      Rehab Staff Signature: Rosamaria Renteria RN      PHYSICIAN REVIEW AND RESPONSE:  Please check appropriate boxes and sign    __X_ The participant may enroll in the Cardiac Rehabilitation Program with the above individualized treatment plan (ITP)  ____ Defer exercise guidelines and outcomes per department policy and protocol  ____ Make the following changes to the ITP/Exercise Prescription:

## 2024-10-24 ENCOUNTER — PATIENT OUTREACH (OUTPATIENT)
Dept: PRIMARY CARE | Facility: CLINIC | Age: 49
End: 2024-10-24
Payer: COMMERCIAL

## 2024-10-28 ENCOUNTER — APPOINTMENT (OUTPATIENT)
Dept: RADIOLOGY | Facility: HOSPITAL | Age: 49
End: 2024-10-28
Payer: COMMERCIAL

## 2024-10-28 ENCOUNTER — HOSPITAL ENCOUNTER (EMERGENCY)
Facility: HOSPITAL | Age: 49
Discharge: HOME | End: 2024-10-28
Payer: COMMERCIAL

## 2024-10-28 ENCOUNTER — APPOINTMENT (OUTPATIENT)
Dept: CARDIOLOGY | Facility: HOSPITAL | Age: 49
End: 2024-10-28
Payer: COMMERCIAL

## 2024-10-28 VITALS
HEART RATE: 99 BPM | SYSTOLIC BLOOD PRESSURE: 123 MMHG | HEIGHT: 64 IN | DIASTOLIC BLOOD PRESSURE: 83 MMHG | OXYGEN SATURATION: 99 % | WEIGHT: 149 LBS | TEMPERATURE: 97.2 F | RESPIRATION RATE: 18 BRPM | BODY MASS INDEX: 25.44 KG/M2

## 2024-10-28 DIAGNOSIS — M79.18 MUSCULOSKELETAL PAIN: ICD-10-CM

## 2024-10-28 DIAGNOSIS — F41.9 ANXIETY: ICD-10-CM

## 2024-10-28 DIAGNOSIS — A49.9 URINARY TRACT INFECTION, BACTERIAL: Primary | ICD-10-CM

## 2024-10-28 DIAGNOSIS — N39.0 URINARY TRACT INFECTION, BACTERIAL: Primary | ICD-10-CM

## 2024-10-28 LAB
ALBUMIN SERPL BCP-MCNC: 4.1 G/DL (ref 3.4–5)
ALP SERPL-CCNC: 49 U/L (ref 33–110)
ALT SERPL W P-5'-P-CCNC: 12 U/L (ref 7–45)
ANION GAP SERPL CALC-SCNC: 10 MMOL/L (ref 10–20)
APPEARANCE UR: CLEAR
AST SERPL W P-5'-P-CCNC: 15 U/L (ref 9–39)
BACTERIA #/AREA URNS AUTO: ABNORMAL /HPF
BASOPHILS # BLD AUTO: 0.04 X10*3/UL (ref 0–0.1)
BASOPHILS NFR BLD AUTO: 0.5 %
BILIRUB SERPL-MCNC: 0.4 MG/DL (ref 0–1.2)
BILIRUB UR STRIP.AUTO-MCNC: NEGATIVE MG/DL
BUN SERPL-MCNC: 15 MG/DL (ref 6–23)
CALCIUM SERPL-MCNC: 9.3 MG/DL (ref 8.6–10.3)
CARDIAC TROPONIN I PNL SERPL HS: 3 NG/L (ref 0–13)
CARDIAC TROPONIN I PNL SERPL HS: 3 NG/L (ref 0–13)
CHLORIDE SERPL-SCNC: 104 MMOL/L (ref 98–107)
CO2 SERPL-SCNC: 27 MMOL/L (ref 21–32)
COLOR UR: COLORLESS
CREAT SERPL-MCNC: 1.35 MG/DL (ref 0.5–1.05)
D DIMER PPP FEU-MCNC: <215 NG/ML FEU
EGFRCR SERPLBLD CKD-EPI 2021: 48 ML/MIN/1.73M*2
EOSINOPHIL # BLD AUTO: 0.12 X10*3/UL (ref 0–0.7)
EOSINOPHIL NFR BLD AUTO: 1.4 %
ERYTHROCYTE [DISTWIDTH] IN BLOOD BY AUTOMATED COUNT: 13.6 % (ref 11.5–14.5)
FLUAV RNA RESP QL NAA+PROBE: NOT DETECTED
FLUBV RNA RESP QL NAA+PROBE: NOT DETECTED
GLUCOSE SERPL-MCNC: 83 MG/DL (ref 74–99)
GLUCOSE UR STRIP.AUTO-MCNC: NORMAL MG/DL
HCT VFR BLD AUTO: 43.6 % (ref 36–46)
HGB BLD-MCNC: 14.3 G/DL (ref 12–16)
IMM GRANULOCYTES # BLD AUTO: 0.04 X10*3/UL (ref 0–0.7)
IMM GRANULOCYTES NFR BLD AUTO: 0.5 % (ref 0–0.9)
INR PPP: 1.1 (ref 0.9–1.1)
KETONES UR STRIP.AUTO-MCNC: NEGATIVE MG/DL
LEUKOCYTE ESTERASE UR QL STRIP.AUTO: NEGATIVE
LYMPHOCYTES # BLD AUTO: 3.01 X10*3/UL (ref 1.2–4.8)
LYMPHOCYTES NFR BLD AUTO: 34 %
MAGNESIUM SERPL-MCNC: 1.92 MG/DL (ref 1.6–2.4)
MCH RBC QN AUTO: 31.6 PG (ref 26–34)
MCHC RBC AUTO-ENTMCNC: 32.8 G/DL (ref 32–36)
MCV RBC AUTO: 97 FL (ref 80–100)
MONOCYTES # BLD AUTO: 0.63 X10*3/UL (ref 0.1–1)
MONOCYTES NFR BLD AUTO: 7.1 %
NEUTROPHILS # BLD AUTO: 5 X10*3/UL (ref 1.2–7.7)
NEUTROPHILS NFR BLD AUTO: 56.5 %
NITRITE UR QL STRIP.AUTO: NEGATIVE
NRBC BLD-RTO: 0 /100 WBCS (ref 0–0)
PH UR STRIP.AUTO: 5.5 [PH]
PLATELET # BLD AUTO: 422 X10*3/UL (ref 150–450)
POTASSIUM SERPL-SCNC: 3.9 MMOL/L (ref 3.5–5.3)
PROT SERPL-MCNC: 6.8 G/DL (ref 6.4–8.2)
PROT UR STRIP.AUTO-MCNC: NEGATIVE MG/DL
PROTHROMBIN TIME: 12.8 SECONDS (ref 9.8–12.8)
RBC # BLD AUTO: 4.52 X10*6/UL (ref 4–5.2)
RBC # UR STRIP.AUTO: ABNORMAL /UL
RBC #/AREA URNS AUTO: ABNORMAL /HPF
SARS-COV-2 RNA RESP QL NAA+PROBE: NOT DETECTED
SODIUM SERPL-SCNC: 137 MMOL/L (ref 136–145)
SP GR UR STRIP.AUTO: 1
SQUAMOUS #/AREA URNS AUTO: ABNORMAL /HPF
TSH SERPL-ACNC: 1.18 MIU/L (ref 0.44–3.98)
UROBILINOGEN UR STRIP.AUTO-MCNC: NORMAL MG/DL
WBC # BLD AUTO: 8.8 X10*3/UL (ref 4.4–11.3)
WBC #/AREA URNS AUTO: ABNORMAL /HPF

## 2024-10-28 PROCEDURE — 93005 ELECTROCARDIOGRAM TRACING: CPT

## 2024-10-28 PROCEDURE — 84484 ASSAY OF TROPONIN QUANT: CPT | Performed by: NURSE PRACTITIONER

## 2024-10-28 PROCEDURE — 71045 X-RAY EXAM CHEST 1 VIEW: CPT

## 2024-10-28 PROCEDURE — 70450 CT HEAD/BRAIN W/O DYE: CPT | Performed by: RADIOLOGY

## 2024-10-28 PROCEDURE — 99285 EMERGENCY DEPT VISIT HI MDM: CPT | Mod: 25

## 2024-10-28 PROCEDURE — 70450 CT HEAD/BRAIN W/O DYE: CPT

## 2024-10-28 PROCEDURE — 85379 FIBRIN DEGRADATION QUANT: CPT | Performed by: NURSE PRACTITIONER

## 2024-10-28 PROCEDURE — 87636 SARSCOV2 & INF A&B AMP PRB: CPT | Performed by: NURSE PRACTITIONER

## 2024-10-28 PROCEDURE — 36415 COLL VENOUS BLD VENIPUNCTURE: CPT | Performed by: NURSE PRACTITIONER

## 2024-10-28 PROCEDURE — 85025 COMPLETE CBC W/AUTO DIFF WBC: CPT | Performed by: NURSE PRACTITIONER

## 2024-10-28 PROCEDURE — 80053 COMPREHEN METABOLIC PANEL: CPT | Performed by: NURSE PRACTITIONER

## 2024-10-28 PROCEDURE — 81001 URINALYSIS AUTO W/SCOPE: CPT | Performed by: NURSE PRACTITIONER

## 2024-10-28 PROCEDURE — 85610 PROTHROMBIN TIME: CPT | Performed by: NURSE PRACTITIONER

## 2024-10-28 PROCEDURE — 84443 ASSAY THYROID STIM HORMONE: CPT | Performed by: NURSE PRACTITIONER

## 2024-10-28 PROCEDURE — 83735 ASSAY OF MAGNESIUM: CPT | Performed by: NURSE PRACTITIONER

## 2024-10-28 PROCEDURE — 71045 X-RAY EXAM CHEST 1 VIEW: CPT | Performed by: RADIOLOGY

## 2024-10-28 RX ORDER — NITROFURANTOIN 25; 75 MG/1; MG/1
100 CAPSULE ORAL 2 TIMES DAILY
Qty: 10 CAPSULE | Refills: 0 | Status: SHIPPED | OUTPATIENT
Start: 2024-10-28 | End: 2024-11-02

## 2024-10-28 ASSESSMENT — HEART SCORE
RISK FACTORS: >2 RISK FACTORS OR HX OF ATHEROSCLEROTIC DISEASE
TROPONIN: LESS THAN OR EQUAL TO NORMAL LIMIT
ECG: NORMAL
HISTORY: SLIGHTLY SUSPICIOUS
AGE: 45-64
HEART SCORE: 3

## 2024-10-29 LAB
ATRIAL RATE: 90 BPM
P AXIS: 78 DEGREES
PR INTERVAL: 146 MS
Q ONSET: 253 MS
QRS COUNT: 14 BEATS
QRS DURATION: 91 MS
QT INTERVAL: 317 MS
QTC CALCULATION(BAZETT): 388 MS
QTC FREDERICIA: 362 MS
R AXIS: 72 DEGREES
T AXIS: -39 DEGREES
T OFFSET: 412 MS
VENTRICULAR RATE: 90 BPM

## 2024-11-01 ENCOUNTER — CLINICAL SUPPORT (OUTPATIENT)
Dept: CARDIAC REHAB | Facility: HOSPITAL | Age: 49
End: 2024-11-01
Payer: COMMERCIAL

## 2024-11-01 DIAGNOSIS — Z95.5 S/P CORONARY ARTERY STENT PLACEMENT: ICD-10-CM

## 2024-11-01 PROCEDURE — 93798 PHYS/QHP OP CAR RHAB W/ECG: CPT | Performed by: INTERNAL MEDICINE

## 2024-11-04 ENCOUNTER — CLINICAL SUPPORT (OUTPATIENT)
Dept: CARDIAC REHAB | Facility: HOSPITAL | Age: 49
End: 2024-11-04
Payer: COMMERCIAL

## 2024-11-04 ENCOUNTER — OFFICE VISIT (OUTPATIENT)
Dept: CARDIOLOGY | Facility: CLINIC | Age: 49
End: 2024-11-04
Payer: COMMERCIAL

## 2024-11-04 VITALS
WEIGHT: 153 LBS | BODY MASS INDEX: 26.26 KG/M2 | DIASTOLIC BLOOD PRESSURE: 70 MMHG | OXYGEN SATURATION: 95 % | HEART RATE: 68 BPM | SYSTOLIC BLOOD PRESSURE: 115 MMHG

## 2024-11-04 DIAGNOSIS — E78.1 HIGH TRIGLYCERIDES: Primary | ICD-10-CM

## 2024-11-04 DIAGNOSIS — R00.0 TACHYCARDIA: ICD-10-CM

## 2024-11-04 DIAGNOSIS — Z72.0 TOBACCO ABUSE: ICD-10-CM

## 2024-11-04 DIAGNOSIS — E78.00 PURE HYPERCHOLESTEROLEMIA: ICD-10-CM

## 2024-11-04 DIAGNOSIS — G47.33 OBSTRUCTIVE SLEEP APNEA, ADULT: ICD-10-CM

## 2024-11-04 DIAGNOSIS — Z09 HOSPITAL DISCHARGE FOLLOW-UP: ICD-10-CM

## 2024-11-04 DIAGNOSIS — I25.10 CORONARY ARTERY DISEASE INVOLVING NATIVE CORONARY ARTERY OF NATIVE HEART, UNSPECIFIED WHETHER ANGINA PRESENT: ICD-10-CM

## 2024-11-04 DIAGNOSIS — J44.9 CHRONIC OBSTRUCTIVE PULMONARY DISEASE, UNSPECIFIED COPD TYPE (MULTI): ICD-10-CM

## 2024-11-04 DIAGNOSIS — Z95.5 S/P CORONARY ARTERY STENT PLACEMENT: ICD-10-CM

## 2024-11-04 PROCEDURE — 93798 PHYS/QHP OP CAR RHAB W/ECG: CPT | Performed by: INTERNAL MEDICINE

## 2024-11-04 PROCEDURE — 99215 OFFICE O/P EST HI 40 MIN: CPT | Performed by: INTERNAL MEDICINE

## 2024-11-04 PROCEDURE — 99215 OFFICE O/P EST HI 40 MIN: CPT | Mod: 25 | Performed by: INTERNAL MEDICINE

## 2024-11-04 NOTE — PROGRESS NOTES
Chief Complaint:   Follow-up     History Of Present Illness:    Raegan Perez is a 49 y.o. female presenting for hospital follow-up.    Patient presented to ED 10/28/2024 with generalized weakness/shortness of breath/tachycardia.  Workup was unremarkable and she was discharged home.Feeling better now.  Having heart burn/acid  Intermittent racing of the heart-HR high at CR  Occasional orthostatic LH ie gardening  Some SOB with activity ie bending over  No definite angina  Going to CR    Not wearing CPAP    Still smoking     Last Recorded Vitals:  Vitals:    11/04/24 1010 11/04/24 1044   BP: 118/75 115/70   Pulse: 68    SpO2: 95%    Weight: 69.4 kg (153 lb)             Allergies:  Adhesive tape-silicones, Cat dander, and Cephalexin    Outpatient Medications:  Current Outpatient Medications   Medication Instructions    Advair -21 mcg/actuation inhaler     albuterol 2.5 mg /3 mL (0.083 %) nebulizer solution INHALE 1 VIAL VIA NEBULIZER EVERY 4 HOURS AS NEEDED FOR WHEEZING  SHORTNESS OF BREATH  OR COUGH    alirocumab (Praluent Pen) 75 mg/mL pen injector Inject 1 pen (75 mg) under the skin every 14 (fourteen) days.    aMILoride (Midamor) 5 mg tablet 1 tablet, Daily    aspirin 81 mg EC tablet 1 tablet, Daily    azelastine (Astelin) 137 mcg (0.1 %) nasal spray 1 spray, Each Nostril, 2 times daily    benzoyl peroxide (Benzac AC) 10 % external wash 1 Application, Topical, Daily    busPIRone (Buspar) 15 mg tablet 1 tablet, Every 12 hours scheduled (0630,1830)    cholecalciferol (VITAMIN D-3) 25 mcg, Daily    clindamycin (Cleocin T) 1 % lotion Topical, 2 times daily    clobetasol (Temovate) 0.05 % ointment APPLY 1 APPLICATION 2 TIMES PER DAY AS NEEDED FOR 14 DAYS    clonazePAM (KlonoPIN) 1 mg tablet 0.5 tablets, Daily    fenofibrate (TRICOR) 145 mg, oral, Daily    icosapent ethyL (VASCEPA) 2 g, oral, 2 times daily    lamoTRIgine (LaMICtal) 100 mg tablet 1 tablet, Daily    metoprolol succinate XL (TOPROL-XL)  12.5 mg, oral, Daily    nicotine (Nicoderm CQ) 14 mg/24 hr patch     nitroglycerin (NITROSTAT) 0.4 mg, sublingual, Every 5 min PRN, May repeat dose every 5 minutes for up to 3 doses total.    nystatin (Mycostatin) 100,000 unit/mL suspension 5 mL, oral, 4 times daily    potassium chloride CR 10 mEq ER tablet Take by mouth. TAKE 1 TABLET BY MOUTH ONCE DAILY WITH FOOD FOR 90 DAYS    Repatha SureClick 140 mg, subcutaneous, Every 14 days    Spiriva Respimat 2.5 mcg/actuation inhaler 2 puffs, Daily    ticagrelor (BRILINTA) 90 mg, oral, 2 times daily    Ventolin HFA 90 mcg/actuation inhaler 2 puffs, Every 4 hours PRN    Xhance 93 mcg/actuation aerosol breath activated 1 puff, intranasal, 2 times daily, Use 1 puff twice daily to bilateral sides       Physical Exam:  Constitutional:       General: Awake.      Appearance: Healthy appearance. Not in distress.   Neck:      Vascular: No JVR. JVD normal.   Pulmonary:      Effort: Pulmonary effort is normal.      Breath sounds: Normal breath sounds. No wheezing. No rhonchi. No rales.   Chest:      Chest wall: Not tender to palpatation.   Cardiovascular:      PMI at left midclavicular line. Normal rate. Regular rhythm. Normal S1. Normal S2.       Murmurs: There is no murmur.      No gallop.  No click. No rub.   Pulses:     Intact distal pulses.   Edema:     Peripheral edema absent.   Abdominal:      General: Bowel sounds are normal.      Palpations: Abdomen is soft.      Tenderness: There is no abdominal tenderness.   Musculoskeletal: Normal range of motion.         General: No tenderness. Skin:     General: Skin is warm and dry.   Neurological:      General: No focal deficit present.      Mental Status: Alert and oriented to person, place and time.          Last Labs:  CBC -  Lab Results   Component Value Date    WBC 8.8 10/28/2024    HGB 14.3 10/28/2024    HCT 43.6 10/28/2024    MCV 97 10/28/2024     10/28/2024       CMP -  Lab Results   Component Value Date    CALCIUM 9.3  10/28/2024    PHOS 3.6 06/05/2024    PROT 6.8 10/28/2024    ALBUMIN 4.1 10/28/2024    AST 15 10/28/2024    ALT 12 10/28/2024    ALKPHOS 49 10/28/2024    BILITOT 0.4 10/28/2024       LIPID PANEL -   Lab Results   Component Value Date    CHOL 311 (H) 06/05/2024    TRIG 637 (H) 06/05/2024    HDL 29.4 06/05/2024    CHHDL 10.6 06/05/2024    LDLF 29 04/14/2023    VLDL  06/05/2024      Comment:      Unable to calculate VLDL.    NHDL 282 (H) 06/05/2024       RENAL FUNCTION PANEL -   Lab Results   Component Value Date    GLUCOSE 83 10/28/2024     10/28/2024    K 3.9 10/28/2024     10/28/2024    CO2 27 10/28/2024    ANIONGAP 10 10/28/2024    BUN 15 10/28/2024    CREATININE 1.35 (H) 10/28/2024    CALCIUM 9.3 10/28/2024    PHOS 3.6 06/05/2024    ALBUMIN 4.1 10/28/2024        Lab Results   Component Value Date    HGBA1C 5.7 05/06/2024           Lab review: I have personally reviewed the laboratory result(s)       Problem List Items Addressed This Visit       CAD (coronary artery disease)    Overview     10/8/19 DARIEN LAD  7/31/24 DARIEN diagonal in setting of MI  No definite angina-10/28/24 ED visit with no ischemic EKG changes /NL troponin  On DAPT/beta blocker/statin.Stop smoking.Follow.         Chronic obstructive pulmonary disease (Multi)    Overview     Mild emphysema changes per 1/2017 CT-stop smoking. No wheeze on exam.         Obstructive sleep apnea, adult    Overview     Not wearing  CPAP regularly  Resume         Pure hypercholesterolemia    Overview     Pt on high intensity statin-1/2021  lipids with LDL=27..Follow heart healthy diet.  Was  instructed by renal to try and come off statin because of renal insuff-changed to Repatha  6/2024 TCHOL was elevated at 311-had not been taking Repatha regularly  Now back on  Recheck         Tachycardia    Overview     In NSR today-on low dose beta blocker  Has frequent racing of heart-makes her anxious  No increase in beat blocker as BP on low side         Tobacco abuse     Overview     Needs to stop smoking-discussed importance  She is cutting back-using nicotine patch intrermittently         High triglycerides - Primary    Overview     4/2022 TT=482-amunjov Vascepa  11/2022 CG=526 on Vascepa, added fenofibrate  4/2023 AO=530  6/2024 TG-637-had NOT been taking Repatha  Reheck           Hospital discharge follow-up    Overview     Records reviewed        Lipids  Stop smoking  Vinicius Swan, DO

## 2024-11-06 ENCOUNTER — OFFICE VISIT (OUTPATIENT)
Dept: DERMATOLOGY | Facility: CLINIC | Age: 49
End: 2024-11-06
Payer: COMMERCIAL

## 2024-11-06 DIAGNOSIS — L72.3 SEBACEOUS CYST: Primary | ICD-10-CM

## 2024-11-06 DIAGNOSIS — L73.2 HIDRADENITIS SUPPURATIVA: ICD-10-CM

## 2024-11-06 PROCEDURE — 99213 OFFICE O/P EST LOW 20 MIN: CPT | Performed by: NURSE PRACTITIONER

## 2024-11-06 NOTE — PROGRESS NOTES
Subjective     Raegan Perez is a 49 y.o. female who presents for the following: single lesion.     Review of Systems:  No other skin or systemic complaints other than what is documented elsewhere in the note.    The following portions of the chart were reviewed this encounter and updated as appropriate:       Skin Cancer History  No skin cancer on file.    Specialty Problems          Dermatology Problems    Chloasma    Epidermal thickening, unspecified    Hemangioma of skin and subcutaneous tissue    Hidradenitis suppurativa    Melanocytic nevi of lower extremity or hip    Melanocytic nevi of scalp and neck    Melanocytic nevi of trunk    Melanocytic nevi of unspecified upper limb, including shoulder    Neoplasm of uncertain behavior of skin    Other follicular cysts of the skin and subcutaneous tissue    Other hypertrophic disorders of the skin    Other melanin hyperpigmentation    Other seborrheic keratosis    Skin eruption     Past Medical History:  Raegan Perez  has a past medical history of Arteritis, unspecified (CMS-HCC) (09/22/2016), Cafe au lait spots (10/27/2017), Candidiasis, unspecified (07/07/2020), Disorder of breast, unspecified (06/16/2020), Disorder of the skin and subcutaneous tissue, unspecified (11/28/2017), Disorder of the skin and subcutaneous tissue, unspecified (11/28/2017), Encounter for gynecological examination (general) (routine) with abnormal findings (01/12/2017), Encounter for gynecological examination (general) (routine) without abnormal findings (08/04/2020), Encounter for gynecological examination (general) (routine) without abnormal findings (06/25/2019), Encounter for other preprocedural examination (03/19/2018), Encounter for screening mammogram for malignant neoplasm of breast (08/04/2020), Encounter for screening mammogram for malignant neoplasm of breast (06/25/2019), Left lower quadrant abdominal tenderness (05/07/2020), Menopausal and female  climacteric states (10/27/2017), Menopausal and female climacteric states (09/15/2016), Menopausal and female climacteric states (01/11/2017), Noninfective gastroenteritis and colitis, unspecified (09/22/2016), Other chest pain (10/30/2019), Other conditions influencing health status (03/13/2020), Other skin changes (06/16/2020), Other specified noninflammatory disorders of vulva and perineum (07/20/2020), Pelvic and perineal pain, Pelvic and perineal pain (12/20/2016), Personal history of diseases of the blood and blood-forming organs and certain disorders involving the immune mechanism (09/22/2016), Personal history of nicotine dependence (03/19/2018), Personal history of other diseases of the circulatory system (12/16/2016), Personal history of other diseases of the digestive system (08/23/2021), Personal history of other diseases of the female genital tract, Personal history of other diseases of the female genital tract (10/27/2017), Personal history of other diseases of the female genital tract (12/20/2016), Personal history of other diseases of the female genital tract (01/11/2017), Personal history of other diseases of the female genital tract (09/20/2017), Personal history of other diseases of the respiratory system, Personal history of other diseases of the respiratory system (09/26/2019), Personal history of other diseases of urinary system, Personal history of other endocrine, nutritional and metabolic disease, Personal history of other medical treatment (01/11/2017), Personal history of other specified conditions (01/26/2017), Personal history of other specified conditions (03/04/2019), Personal history of other specified conditions, Personal history of other specified conditions (09/22/2016), Personal history of other specified conditions (12/09/2019), Unspecified sprain of unspecified finger, initial encounter (09/26/2019), Unspecified symptoms and signs involving the genitourinary system, and Urinary  tract infection, site not specified (07/10/2020).    Past Surgical History:  Raegan Perez  has a past surgical history that includes Other surgical history (09/15/2016); Other surgical history (09/24/2021); Cervical biopsy w/ loop electrode excision (01/11/2017); Other surgical history (03/04/2019); Other surgical history (03/04/2019); Other surgical history (01/11/2017); Other surgical history (09/24/2021); Other surgical history (09/24/2021); MR angio neck wo IV contrast (3/26/2019); MR angio head wo IV contrast (3/26/2019); Cardiac catheterization (N/A, 7/31/2024); and Cardiac catheterization (N/A, 7/31/2024).    Family History:  Patient family history includes Breast cancer in her mother; Coronary artery disease in her father; Hypertension in her father and sister; Memory loss in her father; Ovarian cancer in her sister; Seizures in her daughter; Skin cancer in her daughter and father; Stroke in her father; cardiac disorder in her father and sister.    Social History:  Raegan Perez  reports that she has been smoking cigarettes. She has never used smokeless tobacco. She reports that she does not drink alcohol and does not use drugs.    Allergies:  Adhesive tape-silicones, Cat dander, and Cephalexin    Current Medications / CAM's:    Current Outpatient Medications:     Advair -21 mcg/actuation inhaler, , Disp: , Rfl:     albuterol 2.5 mg /3 mL (0.083 %) nebulizer solution, INHALE 1 VIAL VIA NEBULIZER EVERY 4 HOURS AS NEEDED FOR WHEEZING  SHORTNESS OF BREATH  OR COUGH, Disp: , Rfl:     alirocumab (Praluent Pen) 75 mg/mL pen injector, Inject 1 pen (75 mg) under the skin every 14 (fourteen) days. (Patient not taking: Reported on 10/3/2024), Disp: 1 mL, Rfl: 3    aMILoride (Midamor) 5 mg tablet, Take 1 tablet (5 mg) by mouth once daily., Disp: , Rfl:     aspirin 81 mg EC tablet, Take 1 tablet (81 mg) by mouth once daily., Disp: , Rfl:     azelastine (Astelin) 137 mcg (0.1 %) nasal spray,  Administer 1 spray into each nostril 2 times a day., Disp: 30 mL, Rfl: 11    benzoyl peroxide (Benzac AC) 10 % external wash, Apply 1 Application topically once daily., Disp: 237 g, Rfl: 11    busPIRone (Buspar) 15 mg tablet, Take 1 tablet (15 mg) by mouth every 12 hours., Disp: , Rfl:     cholecalciferol (Vitamin D-3) 25 MCG (1000 UT) tablet, Take 1 tablet (25 mcg) by mouth once daily., Disp: , Rfl:     clindamycin (Cleocin T) 1 % lotion, Apply topically 2 times a day., Disp: 60 mL, Rfl: 1    clobetasol (Temovate) 0.05 % ointment, APPLY 1 APPLICATION 2 TIMES PER DAY AS NEEDED FOR 14 DAYS, Disp: , Rfl:     clonazePAM (KlonoPIN) 1 mg tablet, Take 0.5 tablets (0.5 mg) by mouth once daily., Disp: , Rfl:     evolocumab (Repatha SureClick) 140 mg/mL injection, Inject 1 mL (140 mg) under the skin every 14 (fourteen) days., Disp: 6 mL, Rfl: 3    fenofibrate (Tricor) 145 mg tablet, TAKE ONE TABLET BY MOUTH EVERY DAY, Disp: 90 tablet, Rfl: 3    icosapent ethyL (Vascepa) 1 gram capsule, Take 2 capsules (2 g) by mouth 2 times a day., Disp: 360 capsule, Rfl: 3    lamoTRIgine (LaMICtal) 100 mg tablet, Take 1 tablet (100 mg) by mouth once daily. (Patient not taking: Reported on 11/4/2024), Disp: , Rfl:     metoprolol succinate XL (Toprol-XL) 25 mg 24 hr tablet, Take 0.5 tablets (12.5 mg) by mouth once daily., Disp: 45 tablet, Rfl: 3    nicotine (Nicoderm CQ) 14 mg/24 hr patch, , Disp: , Rfl:     nitroglycerin (Nitrostat) 0.4 mg SL tablet, Place 1 tablet (0.4 mg) under the tongue every 5 minutes if needed for chest pain. May repeat dose every 5 minutes for up to 3 doses total., Disp: 25 tablet, Rfl: 11    nystatin (Mycostatin) 100,000 unit/mL suspension, Take 5 mL (500,000 Units) by mouth 4 times a day., Disp: , Rfl:     potassium chloride CR 10 mEq ER tablet, Take by mouth. TAKE 1 TABLET BY MOUTH ONCE DAILY WITH FOOD FOR 90 DAYS, Disp: , Rfl:     Spiriva Respimat 2.5 mcg/actuation inhaler, Inhale 2 puffs once daily., Disp: , Rfl:  "    ticagrelor (Brilinta) 90 mg tablet, Take 1 tablet (90 mg) by mouth 2 times a day., Disp: 180 tablet, Rfl: 3    Ventolin HFA 90 mcg/actuation inhaler, Inhale 2 puffs every 4 hours if needed., Disp: , Rfl:     Xhance 93 mcg/actuation aerosol breath activated, 1 puff by intranasal route 2 times a day. Use 1 puff twice daily to bilateral sides, Disp: 16 mL, Rfl: 11    Current Facility-Administered Medications:     Tc-99m tetrofosmin (Myoview) injection 10.1 millicurie, 10.1 millicurie, intravenous, Once in imaging, Vinicius Swan, DO    Tc-99m tetrofosmin (Myoview) injection 30.5 millicurie, 30.5 millicurie, intravenous, Once in imaging, Vinicius Swan, DO     Objective   Well appearing patient in no apparent distress; mood and affect are within normal limits.      Assessment/Plan   1. Sebaceous cyst  Left Anterior Neck  1 cm Subcutaneous nodule(s) with normal-appearing overlying skin    -Discussed nature of the condition  -Patient would like to have excised.  We discussed these often reoccur and coverage under insurance is variable.  She states it is painful to sleep with her c-pap strap as it can rub.    -No active drainage today.  ILK 0.1ml of 10mg/ml administered intra lesionally today.       Referral to General Surgery - Left Anterior Neck    2. Hidradenitis suppurativa  Left Inframammary Fold, Pubic, Right Inframammary Fold  Inflammatory papules and abscesses to intermammary folds and bilateral groin.     Hidradenitis suppurativa (HS), or acne inversa, is a chronic destructive inflammatory disorder of the hair follicles. While the pathogenesis is unclear, it is thought that follicular rupture initiates interaction between the follicular microbiome and innate immune system, triggering an inflammatory response.    HS is seen most commonly on the buttocks, breasts, groin, and axillae. Usually, the onset of the disease occurs soon after puberty, and patients typically report recurring \"boils.\" Pain is " consistently reported as the most bothersome symptom, but itching, drainage, odor, fatigue, and arthralgias are frequent additional concerns.    Mechanical irritation such as by friction from tight clothing or shaving is often reported as a trigger. For many women, the week before menses can trigger disease flares, and pregnancy and the postpartum period can be associated with either disease improvement or flaring.    Obesity and cigarette smoking are associated with HS severity. Prevalence of metabolic syndrome, major cardiovascular events, cardiac death, and diabetes (type 1 or type 2) are increased in HS compared to the general populations. Some of the most frequently associated comorbidities are related to mental health, and depression, anxiety, and risk of suicide are major burdens for the population.     A positive family history is reported in 30%-50% of patients.     PLAN:  Restart clindamycin 1% lotion daily  Restart BPO 10% wash daily      Related Medications  clindamycin (Cleocin T) 1 % lotion  Apply topically 2 times a day.    benzoyl peroxide (Benzac AC) 10 % external wash  Apply 1 Application topically once daily.

## 2024-11-08 ENCOUNTER — CLINICAL SUPPORT (OUTPATIENT)
Dept: CARDIAC REHAB | Facility: HOSPITAL | Age: 49
End: 2024-11-08
Payer: COMMERCIAL

## 2024-11-08 ENCOUNTER — OFFICE VISIT (OUTPATIENT)
Dept: PRIMARY CARE | Facility: CLINIC | Age: 49
End: 2024-11-08
Payer: COMMERCIAL

## 2024-11-08 VITALS
OXYGEN SATURATION: 96 % | TEMPERATURE: 97.5 F | HEIGHT: 64 IN | BODY MASS INDEX: 25.78 KG/M2 | WEIGHT: 151 LBS | HEART RATE: 87 BPM | SYSTOLIC BLOOD PRESSURE: 109 MMHG | DIASTOLIC BLOOD PRESSURE: 73 MMHG

## 2024-11-08 DIAGNOSIS — J44.1 ACUTE EXACERBATION OF CHRONIC OBSTRUCTIVE PULMONARY DISEASE (COPD) (MULTI): Primary | ICD-10-CM

## 2024-11-08 DIAGNOSIS — Z95.5 S/P CORONARY ARTERY STENT PLACEMENT: ICD-10-CM

## 2024-11-08 PROCEDURE — 3008F BODY MASS INDEX DOCD: CPT | Performed by: FAMILY MEDICINE

## 2024-11-08 PROCEDURE — 99213 OFFICE O/P EST LOW 20 MIN: CPT | Performed by: FAMILY MEDICINE

## 2024-11-08 PROCEDURE — 93798 PHYS/QHP OP CAR RHAB W/ECG: CPT | Performed by: INTERNAL MEDICINE

## 2024-11-08 RX ORDER — LEVOFLOXACIN 500 MG/1
500 TABLET, FILM COATED ORAL DAILY
Qty: 14 TABLET | Refills: 0 | Status: SHIPPED | OUTPATIENT
Start: 2024-11-08 | End: 2024-11-22

## 2024-11-08 RX ORDER — PREDNISONE 10 MG/1
TABLET ORAL
Qty: 32 TABLET | Refills: 0 | Status: SHIPPED | OUTPATIENT
Start: 2024-11-08

## 2024-11-08 ASSESSMENT — COLUMBIA-SUICIDE SEVERITY RATING SCALE - C-SSRS
6. HAVE YOU EVER DONE ANYTHING, STARTED TO DO ANYTHING, OR PREPARED TO DO ANYTHING TO END YOUR LIFE?: NO
1. IN THE PAST MONTH, HAVE YOU WISHED YOU WERE DEAD OR WISHED YOU COULD GO TO SLEEP AND NOT WAKE UP?: NO
2. HAVE YOU ACTUALLY HAD ANY THOUGHTS OF KILLING YOURSELF?: NO

## 2024-11-08 ASSESSMENT — PATIENT HEALTH QUESTIONNAIRE - PHQ9
SUM OF ALL RESPONSES TO PHQ9 QUESTIONS 1 AND 2: 0
1. LITTLE INTEREST OR PLEASURE IN DOING THINGS: NOT AT ALL
1. LITTLE INTEREST OR PLEASURE IN DOING THINGS: NOT AT ALL
SUM OF ALL RESPONSES TO PHQ9 QUESTIONS 1 AND 2: 0
2. FEELING DOWN, DEPRESSED OR HOPELESS: NOT AT ALL
2. FEELING DOWN, DEPRESSED OR HOPELESS: NOT AT ALL

## 2024-11-08 NOTE — PROGRESS NOTES
"Subjective   Patient ID: Raegan Perez is a 49 y.o. female who presents for Hospital Follow-up (Pt is here for hospital F/U , had UTI. ).    HPI   Patient was in the emergency room 2 weeks ago.  She had multiple complaints but has had a persisting cough.  They did provide some Macrobid for a UTI.  She is using her pulmonary meds along with her nebulizer regularly.  Review of Systems    Objective   /73   Pulse 87   Temp 36.4 °C (97.5 °F)   Ht 1.626 m (5' 4\")   Wt 68.5 kg (151 lb)   SpO2 96%   BMI 25.92 kg/m²     Physical Exam  Alert, pleasant and in no acute distress.  Heart: Regular rate and rhythm  Lungs: Bilateral diffuse rhonchi with mild wheezing  Lower extremities: No edema  Assessment/Plan   Problem List Items Addressed This Visit    None  Visit Diagnoses         Codes    Acute exacerbation of chronic obstructive pulmonary disease (COPD) (Multi)    -  Primary J44.1    Relevant Medications    predniSONE (Deltasone) 10 mg tablet    levoFLOXacin (Levaquin) 500 mg tablet        Patient here with persisting harsh cough and wheezing.  Her pulse ox have been stable in the mid 90s.  Antibiotics and prednisone prescriptions provided.  Patient to let us know if she is not improving in 5 days       "

## 2024-11-14 ENCOUNTER — APPOINTMENT (OUTPATIENT)
Dept: CARDIOLOGY | Facility: CLINIC | Age: 49
End: 2024-11-14
Payer: COMMERCIAL

## 2024-11-15 ENCOUNTER — APPOINTMENT (OUTPATIENT)
Dept: GASTROENTEROLOGY | Facility: CLINIC | Age: 49
End: 2024-11-15
Payer: COMMERCIAL

## 2024-11-15 VITALS
SYSTOLIC BLOOD PRESSURE: 109 MMHG | HEART RATE: 95 BPM | WEIGHT: 152 LBS | HEIGHT: 64 IN | DIASTOLIC BLOOD PRESSURE: 71 MMHG | BODY MASS INDEX: 25.95 KG/M2

## 2024-11-15 DIAGNOSIS — K59.09 CHRONIC CONSTIPATION: ICD-10-CM

## 2024-11-15 DIAGNOSIS — R11.2 NAUSEA AND VOMITING, UNSPECIFIED VOMITING TYPE: ICD-10-CM

## 2024-11-15 DIAGNOSIS — K58.1 IRRITABLE BOWEL SYNDROME WITH CONSTIPATION: ICD-10-CM

## 2024-11-15 DIAGNOSIS — F17.210 NICOTINE DEPENDENCE, CIGARETTES, UNCOMPLICATED: ICD-10-CM

## 2024-11-15 DIAGNOSIS — K44.9 HIATAL HERNIA: ICD-10-CM

## 2024-11-15 DIAGNOSIS — Z11.59 ENCOUNTER FOR SCREENING FOR OTHER VIRAL DISEASES: Primary | ICD-10-CM

## 2024-11-15 DIAGNOSIS — K64.9 HEMORRHOIDS, UNSPECIFIED HEMORRHOID TYPE: ICD-10-CM

## 2024-11-15 DIAGNOSIS — D12.6 COLON ADENOMA: ICD-10-CM

## 2024-11-15 DIAGNOSIS — R13.10 DYSPHAGIA, UNSPECIFIED TYPE: ICD-10-CM

## 2024-11-15 DIAGNOSIS — K57.90 DIVERTICULOSIS: ICD-10-CM

## 2024-11-15 PROCEDURE — 3008F BODY MASS INDEX DOCD: CPT | Performed by: STUDENT IN AN ORGANIZED HEALTH CARE EDUCATION/TRAINING PROGRAM

## 2024-11-15 PROCEDURE — 99406 BEHAV CHNG SMOKING 3-10 MIN: CPT | Performed by: STUDENT IN AN ORGANIZED HEALTH CARE EDUCATION/TRAINING PROGRAM

## 2024-11-15 PROCEDURE — 99205 OFFICE O/P NEW HI 60 MIN: CPT | Performed by: STUDENT IN AN ORGANIZED HEALTH CARE EDUCATION/TRAINING PROGRAM

## 2024-11-15 RX ORDER — SENNOSIDES 8.6 MG/1
17.2 CAPSULE, GELATIN COATED ORAL 2 TIMES DAILY
Qty: 60 CAPSULE | Refills: 11 | Status: SHIPPED | OUTPATIENT
Start: 2024-11-15 | End: 2025-11-10

## 2024-11-15 NOTE — PATIENT INSTRUCTIONS
1-to help with your bowel movements please start taking senna 2 tablets twice a day, Gas-X over-the-counter 3 or 4 times a day.  Please increase water intake fiber intake and physical activity as tolerated  2-we will reach out to the heart doctor to ask about when it is the best timing for upper endoscopy and colonoscopy  3-for heartburn please elevate the head of the bed 6 to 8 inches, have an early dinner at least 3 hours before sleep, have small frequent meals (3-5 small meals per day), have a high liquid diet, avoid lying down after meals, avoid spices and food and fatty food.     97.7

## 2024-11-15 NOTE — PROGRESS NOTES
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  Patient mentions having 1 bowel movement up to every 2 weeks with [place] 3-4 bowel movements, mentions significant abdominal rumbling sounds.  She mentions having chronic solid food dysphagia in the neck and the lower chest.  She is not taking omeprazole anymore because of her CKD.  She mentions chronic daily postprandial vomiting happening right away after she finishes her meals, even without nausea, she swallows her food afterwards, mentioned that this is happening since she was 11 years of age.  She denies losing any weight.  She mentions continuous heartburn even without food.    EGD 11/2021 grade a esophagitis, small hiatal hernia, biopsy of the esophagus stomach and duodenum unremarkable  Colonoscopy 11/2021 [place] 9, sigmoid diverticulosis, hemorrhoids, 10 mm descending and 12 mm rectal TA and HP, biopsy unremarkable, repeat in 3 years  Family history reviewed, not pertinent to chief complaint  Bowel movements as above  Denies NSAIDs alcohol marijuana drug use, positive smoker mild               The note was created using voice recognition transcription software. Despite proofreading, unintentional typographical errors may be present. Please contact the GI office with any questions or concerns.     Current Medications: reviewed    A 10 point review of system is negative except for what is mentioned in the HPI    Follow up with GI was advised       Vital Signs: Reviewed    Physical Exam:  General: no apparent distress, pleasant and cooperative  Skin:  Warm and dry, no jaundice  HEENT: No scleral icterus, no conjunctival pallor, normocephalic, atraumatic, mucous membranes moist  Neck:  atraumatic, trachea midline, no JVD  Chest:  decreased air entry to auscultation bilaterally. No wheezes, rales, or rhonchi  CV:  Regular rate and rhythm.   Positive S1/S2  Abdomen: no distension, +BS, soft, epigastric-tender to palpation, no rebound tenderness, no guarding, no rigidity, no discernible ascites   Extremities: lower extremity edema, Chronic pigmentary changes, no cyanosis  Neurological:  A&Ox3 , no asterixis  Psychiatric: cooperative     Investigations:  Labs, radiological imaging and cardiac work up were reviewed    1-screen for hepatitis C    2-BMI 26, healthy lifestyle advised    3-Healthcare maintenance, colonoscopy as above, will need to repeat after 1 week of twice daily MiraLAX after cardiology clearance, discussed with cardiology Dr Swan would prefer to wait for 1 year (around 8/2025)    4-smoking cessation advised, 4 minutes    5-solid food dysphagia in the neck and the lower chest and chronic heartburn, even without food, seen by ENT in the past, status post EGD as above, currently off of omeprazole because of CKD, will contact nephrology, lifestyle changes advised, will schedule EGD after cardiology clearance, possible functional component, follow-up with cardiology after recent MI, discussed with cardiology Dr Swan would prefer to wait for 1 year (around 8/2025)    6-postprandial vomiting happening after every meal and even after bland diet for chronic since age 11, likely multifactorial (possible component of rumination, IBS versus others), will follow EGD after cardiology clearance, discussed with cardiology Dr Swan would prefer to wait for 1 year (around 8/2025), discussed with nephrology Dr Javad hendrickson for omeprazole 20 mg daily for 2 months     7-abdominal discomfort with chronic constipation described as above, suggestive of IBS-C, start senna 2 tablets twice a day, Gas-X as needed, lifestyle changes advised

## 2024-11-16 RX ORDER — OMEPRAZOLE 20 MG/1
20 CAPSULE, DELAYED RELEASE ORAL
Qty: 30 CAPSULE | Refills: 1 | Status: SHIPPED | OUTPATIENT
Start: 2024-11-16 | End: 2025-11-16

## 2024-11-18 ENCOUNTER — CLINICAL SUPPORT (OUTPATIENT)
Dept: CARDIAC REHAB | Facility: HOSPITAL | Age: 49
End: 2024-11-18
Payer: COMMERCIAL

## 2024-11-18 DIAGNOSIS — Z95.5 S/P CORONARY ARTERY STENT PLACEMENT: ICD-10-CM

## 2024-11-18 PROCEDURE — 93798 PHYS/QHP OP CAR RHAB W/ECG: CPT | Performed by: INTERNAL MEDICINE

## 2024-11-19 NOTE — PROGRESS NOTES
Cardiac Rehabilitation  Individual  Treatment  Plan   -   90 Day Reassessment 11/21/2024    Name: Raegan Perez  Medical Record Number: [id number]  YOB: 1975  Age: 49 y.o.    Today’s Date: 11/19/2024  Primary Care Physician: Bao Thibodeaux DO  Referring Physician: Vinicius Swan DO  Program Location: [facility] CARDIAC REHAB     General  Primary Diagnosis: PCI  1. S/P coronary artery stent placement  Follow Up In Cardiac Rehab       2.     PCI    Onset/Date of Diagnosis: 7/31/24       CR/WI/VR Session # attended:   19 Sessions Attended.  Patient has intermittent Attendance    AACVPR Risk Stratification:   High    Falls Risk: Low    Psychosocial Assessment     Pre PH-Q 9 survey score:  11  Post PH-Q9 survey score: To be done at disharge.    Sent PH-Q 9 to MD if score > 20: N/A    Pt reported/currently experiencing stress: Yes; Anxiety; Severity: mild and Depression; Severity: mild  Patient uses stress management skills: Yes   History of:  Anxiety and depression  Currently seeing a mental health provider: Yes, Then provider name: Dr. Hector Dominguez  Social Support: Yes, Whom:Spouse, daughter  Quality of Life Survey: SF-36   SF-36 Pre Post   Physical Component Score 38.09 TBD   Mental Component Score 32.36 TBD   General Health Score    28.15 TBD           Learning Assessment:  Learning assessment/barriers: Emotional  Preferred learning method: Auditory, Visual, and Writing handout  Barriers: None  Comments: Patient is ready and willing to learn    Knowledge Assessment /Survey:  Pre survey score:  9     Post survey score: TBD at discharge    Stages of Change: Action    Psychosocial Plan    Goal Status: In Progress    Psychosocial Goals: Maintain or lower PH-Q 9 score by discharge and Maintain or improve Post SF-36 Quality of Life Survey.    Psychosocial Interventions/Education:   Patient follows curently with a Psychiatrist Dr. Hector Dominguez and is on Psychosocial medications for  "Bi-Polar disease per patient.  PHQ9 reviewed with patient     AMB CR/ME/VR Psychosocial  -  90 Day Reassessment: 11/21/2024  Patient has H/O Bi-Polar disorder    Nutrition Assessment:    Hyperlipidemia: Yes     Lipids:   Lab Results   Component Value Date    CHOL 311 (H) 06/05/2024    HDL 29.4 06/05/2024    LDLF 29 04/14/2023    TRIG 637 (H) 06/05/2024       Current Dietary Guidelines:  Regular diet  Barriers to dietary change: no    Diet Habit Survey: [place] Dietary Assessment  Pre survey score: 61%  Post survey score: To be done at discharge.    Diabetes Assessment    Lab Results   Component Value Date    HGBA1C 5.7 05/06/2024       History of Diabetes: No    Weight Management:    PRE - Weight 151.2 lbs          Ht: 64 inches      PRE - BMI:25.9  Waist Circ  PRE: 37.5 in    POST - Weight TBD lbs          Ht: 64 inches      POST- BMI:  TBD  Waist Circ  POST: TBD    Nutrition Plan    Goal Status: In Progress    Nutrition Goals:  Achieve a score of 80% or greater on Post [place] Dietary Assessment.                               Attend at least 50% of education lectures provided.                               Weight loss of 5lbs by completion of program.    Nutrition Interventions/Education:   AHA handout \"What is angina\"? And \"What is Cardiac Rehabilitation\"?  Lipid profile reviewed during initial phone interview  Referral to Outpatient Nutrition Therapy. Patient states her insurance company has a Dietitian that she sets up an appointment with.  Pt attended Fiber and Plant Based Eating ( Dietitian) 10/2/24     AMB CR/ME/VR Nutrition  -  90 Day Reassessment 11/21/2024  Provided Healthy Eating Tip Sheets and reviewed areas with lowest scores for improvement with patient.    Exercise Assessment    None, Sedentary  Mode: NA  Frequency: NA  Duration: NA    Exercise Prescription     Exercise Prescription based on: Duke Activity Status Index (DASI) and Health History    DASI Score: 23.45  MET Score: 2.8  (Score " divided by 2)     Frequency:  3 days/week   Mode: Treadmill, Nu-step,/Nu-step (T),Airdyne,Arm Ergometer   Duration: 45 total aerobic minutes   Intensity: RPE 11-15  Target HR:  119-139  MET Level: 2.8 max starting  Patient wears supplemental O2: No     Modality Workload METs Duration (minutes)   1 Pre-Exercise/Rest -- -- 5:00   2 NuStep 4000 84 colon, Level 4  3.5 15 :00   3 NuStep T5 64 colon, Level 4  3.5 15:00   4 Treadmill 2.7 mph, 2% incline   3.8 15 :00   5 Airdyne 1.0 Load,  25-30 Rpm 3.1 15 :00   6 Arm Ergometer 18 Colon, Level 2 2.4 15:00   7 Post-Exercise/Rest -- -- 5:00     Resistance Training:  NA  Home Exercise Prescription given: yes      Exercise Plan    Goal Status:  In Progress    Exercise Goals: Obtain 150 minutes/week of moderate intensity aerobic exercise                             Exercise frequency 5-7 days/week.    Exercise progression:  Maintained exercise duration at 45 minutes.  No change to exercise intensity but reviewed, PT HR/RPE appropriate  Patient has intermittent attendance.    Exercise Interventions/Education:   Has been given bi-weekly rx reviewed with pt and ex log given    [facility] AMB CR/FL/VR Exercise  -  90 Day Reassessment:  11/21/2024  Patient has HX: of POTS with increased heart rate. Will progress slowly on TM for safety.      Other Core Components/Risk Factor Assessment:    Medication adherence    Current Medications:   Medication Documentation Review Audit       Reviewed by Kole Plasencia MD (Physician) on 11/15/24 at 1301      Medication Order Taking? Sig Documenting Provider Last Dose Status   Advair -21 mcg/actuation inhaler [id number] Yes  Historical Provider, MD  Active   albuterol 2.5 mg /3 mL (0.083 %) nebulizer solution [id number] Yes INHALE 1 VIAL VIA NEBULIZER EVERY 4 HOURS AS NEEDED FOR WHEEZING  SHORTNESS OF BREATH  OR COUGH Historical Provider, MD  Active   aMILoride (Midamor) 5 mg tablet [id number] Yes Take 1 tablet (5 mg) by mouth once daily. Historical  Provider, MD  Active   aspirin 81 mg EC tablet [id number] Yes Take 1 tablet (81 mg) by mouth once daily. Historical Provider, MD  Active   azelastine (Astelin) 137 mcg (0.1 %) nasal spray [id number] Yes Administer 1 spray into each nostril 2 times a day. Ravin Corral MD  Active   benzoyl peroxide (Benzac AC) 10 % external wash [id number] Yes Apply 1 Application topically once daily. VÍCTOR Wayne  Active   busPIRone (Buspar) 15 mg tablet [id number] Yes Take 1 tablet (15 mg) by mouth every 12 hours. Historical Provider, MD  Active   cholecalciferol (Vitamin D-3) 25 MCG (1000 UT) tablet [id number] Yes Take 1 tablet (25 mcg) by mouth once daily. Historical Provider, MD  Active   clindamycin (Cleocin T) 1 % lotion [id number] Yes Apply topically 2 times a day. VÍCTOR Wayne  Active   clobetasol (Temovate) 0.05 % ointment [id number] Yes APPLY 1 APPLICATION 2 TIMES PER DAY AS NEEDED FOR 14 DAYS Historical Provider, MD  Active   clonazePAM (KlonoPIN) 1 mg tablet [id number] Yes Take 0.5 tablets (0.5 mg) by mouth once daily. Historical Provider, MD  Active   evolocumab (Repatha SureClick) 140 mg/mL injection [id number] Yes Inject 1 mL (140 mg) under the skin every 14 (fourteen) days. Vinicius Swan,   Active   fenofibrate (Tricor) 145 mg tablet [id number] Yes TAKE ONE TABLET BY MOUTH EVERY DAY Vinicius Swan, DO  Active   icosapent ethyL (Vascepa) 1 gram capsule [id number] Yes Take 2 capsules (2 g) by mouth 2 times a day. Vinicius Swan, DO  Active   lamoTRIgine (LaMICtal) 100 mg tablet [id number] Yes Take 1 tablet (100 mg) by mouth once daily. Historical Provider, MD  Active   levoFLOXacin (Levaquin) 500 mg tablet [id number] Yes Take 1 tablet (500 mg) by mouth once daily for 14 days. Bao Thibodeaux, DO  Active   metoprolol succinate XL (Toprol-XL) 25 mg 24 hr tablet [id number]  Take 0.5 tablets (12.5 mg) by mouth once daily. Vinicius Swan DO   10/04/24 6740   nicotine (Nicoderm CQ)  14 mg/24 hr patch [id number] Yes  Historical Provider, MD  Active   nitroglycerin (Nitrostat) 0.4 mg SL tablet [id number] Yes Place 1 tablet (0.4 mg) under the tongue every 5 minutes if needed for chest pain. May repeat dose every 5 minutes for up to 3 doses total. Vinicius Swan, DO  Active   nystatin (Mycostatin) 100,000 unit/mL suspension [id number]  Take 5 mL (500,000 Units) by mouth 4 times a day.   Patient not taking: Reported on 11/15/2024    Historical Provider, MD  Active   potassium chloride CR 10 mEq ER tablet [id number] Yes Take by mouth. TAKE 1 TABLET BY MOUTH ONCE DAILY WITH FOOD FOR 90 DAYS Historical Provider, MD  Active   predniSONE (Deltasone) 10 mg tablet [id number] Yes Take 4 tabs a day for 3 days, then 3 a day for 3 days, then 2 a day for 3 days, then 1 a day for 3 days, and then 0.5 tab a day until gone Bao Thibodeaux, DO  Active   Spiriva Respimat 2.5 mcg/actuation inhaler [id number] Yes Inhale 2 puffs once daily. Historical Provider, MD  Active   Tc-99m tetrofosmin (Myoview) injection 10.1 millicurie [id number]   Marily Jasmine MD  Active   Tc-99m tetrofosmin (Myoview) injection 30.5 millicurie [id number]   Vinicius Swan,   Active   ticagrelor (Brilinta) 90 mg tablet [id number] Yes Take 1 tablet (90 mg) by mouth 2 times a day. Vinicius Swan DO  Active   Ventolin HFA 90 mcg/actuation inhaler [id number] Yes Inhale 2 puffs every 4 hours if needed. Historical Provider, MD  Active   Xhance 93 mcg/actuation aerosol breath activated [id number] Yes 1 puff by intranasal route 2 times a day. Use 1 puff twice daily to bilateral sides Ravin Corral MD  Active                 PATIENT STATES THE PLAVIX WAS DISCONTINUED AND SHE WAS STARTED ON BRILINTA 90MG TWICE DAILY, AND IS NOT USING THE NICODERM PATCH AND NO MENTION OF MYOVIEW INJECTIONS.    Allergies: Adhesive tape; itching, blisters, skin peeling                  Cephalexin; face turns bright red and feels like on fire                   "Silicone ?                Medication compliance: Yes   Uses pill box/organizer: Yes    Carries medication list: Yes     Blood Pressure Management  History of Hypertension: No   Medication Changes: Yes PLAVIX DISCONTINUED AND BRILINTA 90MG TWICE DAILY STARTED.    VITALS TAKEN AT OFFICE VISIT ON 8/9/24:    /70 Lt. Arm           SpO2 96%       Pulse 95    (See session reports for  ALL  V.S.  documented  during class sessions)      Heart Failure Management  Hx of Heart Failure: No      Smoking/Tobacco Assessment  Social History     Tobacco Use   Smoking Status Every Day    Current packs/day: 0.50    Types: Cigarettes   Smokeless Tobacco Never   Smokes 10 cigarettes/day. Patient states she is not ready to set tobacco cessation date.    Anyone in the home smoke: NO      Other Core Component Plan    Goal Status: In Progress    Other Core Component Goals: Achieve resting BP of < 130/80 by discharge and Abstaining from tobacco 2 hours prior to class, knowledge on medication usage and actions by discharge, patient carries updated list of medications with them at all times,refer to tobacco cessation program, set tobacco quit date while enrolled in the program, smoking cessation by completion of program.    Other Core Component Interventions/Education:   # of cigarettes smoked = 10 per day, BP readings taken pre and post exercise at each session.  Patient continues to smoke/quit goal and reasons for continuing discussed: Patient states she is not ready to set quit date yet. Patient given AHA educational handout \"How Do I Manage My Medications\"?, Patient given \"How To be a Quitter\" Smoking cessation booklet.  Pt attended education  on Cardiac Medications provided by the Pharmacists   Pt attended Stents and Stress Testing, Heart Attacks,   11/8/24 Patient states she is still smoking 5- 10 cigarettes/day. States she has been under stress since father passed away, which has made it difficult to cut back on smoking. States " cannot use the nicotine patch due to be allergic to adhesive.          [facility] AMB CR/OR/VR Other Core Component  -  90 Day Reassessment:   11/21/2024       Educational Classes:   Cardiac Medications 8/28/2024, Stents 9/4/2024, Stress Testing 9/11/2024, CAD Risk Factors 9/25/24, Fiber+Plant Based Eating (Dietitian) 10/2/24, Heart Attacks 10/16/24      Individual Patient Goals:    Establish home exercise program 3-5 days/week for 30-60 minutes daily by completion of program.  Goal Status: In Progress    Become comfortable with my exercise program by completion of program.  Goal Status: In Progress    Weight loss of 5lbs by completion of program.  Goal Status: In Progress      Staff Comments:  Patient has done well in the program so far. She has had intermittent attendance but shows the ability to handle increased workloads as previously changed. No issues or complaints to note. Will continue to monitor and progress as She can tolerate.      Rehab Staff Signature: Rosamaria Renteria RN      PHYSICIAN REVIEW AND RESPONSE:  Please check appropriate boxes and sign    __X_ The participant may enroll in the Cardiac Rehabilitation Program with the above individualized treatment plan (ITP)  ____ Defer exercise guidelines and outcomes per department policy and protocol  ____ Make the following changes to the ITP/Exercise Prescription:

## 2024-11-20 ENCOUNTER — APPOINTMENT (OUTPATIENT)
Dept: CARDIAC REHAB | Facility: HOSPITAL | Age: 49
End: 2024-11-20
Payer: COMMERCIAL

## 2024-11-20 DIAGNOSIS — Z95.5 S/P CORONARY ARTERY STENT PLACEMENT: ICD-10-CM

## 2024-11-20 PROCEDURE — 93798 PHYS/QHP OP CAR RHAB W/ECG: CPT | Performed by: INTERNAL MEDICINE

## 2024-11-20 NOTE — PROGRESS NOTES
Cardiac Rehabilitation {[facility] CR/AK/VR NOTE TYPE:17398}    Name: Raegan Perez  Medical Record Number: [id number]  YOB: 1975  Age: 49 y.o.    Today’s Date: 11/20/2024  Primary Care Physician: Bao Thibodeaux DO  Referring Physician: Vinicius Swan DO  Program Location: [facility] CARDIAC REHAB     General  Primary Diagnosis:   1. S/P coronary artery stent placement  Follow Up In Cardiac Rehab         Onset/Date of Diagnosis: ***    {UH CR/AK/VR Session Number:12858}    AACVPR Risk Stratification:       Falls Risk: {UH CR/AK/VR FALLS RISK:44685}  Psychosocial Assessment     No data recorded    Sent PH-Q 9 to MD if score > 20: {UH CR/AK/VR PHQ-9 SENT TO MD:88750}    Pt reported/currently experiencing stress: {UH CR/AK/VR Reported Stress:96863}  Patient uses stress management skills: {YES/NO:200010}  History of: {Hx Anxiety/Depression:53615}  Currently seeing a mental health provider: {Yes/No:85712}  Social Support: {Yes/No:36223}  Quality of Life Survey: {UH CR/AK/VR QOLS:88048}  Learning Assessment:  Learning assessment/barriers: {Assessment/barriers:77941}  Preferred learning method: {Preferred learning method:88828}  Barriers: {Barriers to Education:67528}  Comments:    Stages of Change:{Stages of change:18198}    Psychosocial Plan    Goal Status: {UH CR/AK/VR Goal Status:57088}    Psychosocial Goals: {UH CR/AK/VR PSYCHOSOCIAL GOALS:68459}    Psychosocial Interventions/Education: {UH CR/AK/VR To be done in Cardiac Rehab:82974}    {UH AMB CR/AK/VR Psychosocial Initial Reassess Discharge:24017}    Nutrition Assessment:    Hyperlipidemia: {YES/NO:200010}    Lipids:   Lab Results   Component Value Date    CHOL 311 (H) 06/05/2024    HDL 29.4 06/05/2024    LDLF 29 04/14/2023    TRIG 637 (H) 06/05/2024       Current Dietary Guidelines: {Dietary Guidelines:44912:x}  Barriers to dietary change: {response; yes (wildcard)/no:083407}    Diet Habit Survey: Picture Your Plate  Pre: {UH CR/AK/VR PYP  Initial:77848}  Post: { CR/WI/VR PYP Post:62075}    Diabetes Assessment    Lab Results   Component Value Date    HGBA1C 5.7 05/06/2024       History of Diabetes: { CR/WI/VR DM ASSESSMENT:93782}    Weight Management       No data recorded    Nutrition Plan    Goal Status: { CR/WI/VR Goal Status:14531}    Nutrition Goals: { CR/WI/VR Nutrition Goals:98556}    Nutrition Interventions/Education:   {UH CR/WI/VR To be done in Cardiac Rehab:14899}    { AMB CR/WI/VR Nutrition Initial Reassess Discharge:71909}    Exercise Assessment    { CR/WI/VR Current Home Exercise:57845}    Exercise Prescription     Exercise Prescription based on: { CR/WI/VR ExRx Evaluation:34441}   Frequency:  { CR/WI/VR Session Frequency:33021} days/week   Mode: {Mode:23836}   Duration: *** total aerobic minutes   Intensity: RPE ***  Target HR:  { CR/WI/VR Target HR:89960}  MET Level: ***  Patient wears supplemental O2: { CR/WI/VR Supplemental O2:04440}     Modality Workload METs Duration (minutes)   1 Pre-Exercise      2 { CR/WI/VR Exercise Modality:63428} ***  *** *** :00   3 { CR/WI/VR Exercise Modality:73371} ***  *** *** :00   4 { CR/WI/VR Exercise Modality:64216} ***  *** *** :00   5 { CR/WI/VR Exercise Modality:76603} ***  *** *** :00   6 Post-Exercise        Resistance Training: {YES/NO:459371}  Home Exercise Prescription given: { CR/WI/VR Home Ex Rx:04008}    Exercise Plan    Goal Status: { CR/WI/VR Goal Status:39690}    Exercise Goals: { CR/WI/VR Exercise Goals:51720}    Exercise Interventions/Education:   {UH CR/WI/VR To be done in Cardiac Rehab:19065}    { AMB CR/WI/VR Exercise Initial Reassess Discharge:92279}    Other Core Components/Risk Factor Assessment:    Medication adherence  Current Medications:   Medication Documentation Review Audit       Reviewed by Kole Plasencia MD (Physician) on 11/15/24 at 1301      Medication Order Taking? Sig Documenting Provider Last Dose Status   Advair -02  mcg/actuation inhaler [id number] Yes  Historical Provider, MD  Active   albuterol 2.5 mg /3 mL (0.083 %) nebulizer solution [id number] Yes INHALE 1 VIAL VIA NEBULIZER EVERY 4 HOURS AS NEEDED FOR WHEEZING  SHORTNESS OF BREATH  OR COUGH Historical Provider, MD  Active   aMILoride (Midamor) 5 mg tablet [id number] Yes Take 1 tablet (5 mg) by mouth once daily. Historical Provider, MD  Active   aspirin 81 mg EC tablet [id number] Yes Take 1 tablet (81 mg) by mouth once daily. Historical Provider, MD  Active   azelastine (Astelin) 137 mcg (0.1 %) nasal spray [id number] Yes Administer 1 spray into each nostril 2 times a day. Ravin Corral MD  Active   benzoyl peroxide (Benzac AC) 10 % external wash [id number] Yes Apply 1 Application topically once daily. VÍCTOR Wayne  Active   busPIRone (Buspar) 15 mg tablet [id number] Yes Take 1 tablet (15 mg) by mouth every 12 hours. Historical Provider, MD  Active   cholecalciferol (Vitamin D-3) 25 MCG (1000 UT) tablet [id number] Yes Take 1 tablet (25 mcg) by mouth once daily. Historical Provider, MD  Active   clindamycin (Cleocin T) 1 % lotion [id number] Yes Apply topically 2 times a day. VÍCTOR Wayne  Active   clobetasol (Temovate) 0.05 % ointment [id number] Yes APPLY 1 APPLICATION 2 TIMES PER DAY AS NEEDED FOR 14 DAYS Historical Provider, MD  Active   clonazePAM (KlonoPIN) 1 mg tablet [id number] Yes Take 0.5 tablets (0.5 mg) by mouth once daily. Historical Provider, MD  Active   evolocumab (Repatha SureClick) 140 mg/mL injection [id number] Yes Inject 1 mL (140 mg) under the skin every 14 (fourteen) days. Vinicius Swan DO  Active   fenofibrate (Tricor) 145 mg tablet [id number] Yes TAKE ONE TABLET BY MOUTH EVERY DAY Vinicius Swan DO  Active   icosapent ethyL (Vascepa) 1 gram capsule [id number] Yes Take 2 capsules (2 g) by mouth 2 times a day. Vinicius Swan,   Active   lamoTRIgine (LaMICtal) 100 mg tablet [id number] Yes Take 1 tablet (100 mg) by mouth once  daily. Historical Provider, MD  Active   levoFLOXacin (Levaquin) 500 mg tablet [id number] Yes Take 1 tablet (500 mg) by mouth once daily for 14 days. Bao Thibodeaux, DO  Active   metoprolol succinate XL (Toprol-XL) 25 mg 24 hr tablet [id number]  Take 0.5 tablets (12.5 mg) by mouth once daily. Vinicius Swan, DO   10/04/24 2359   nicotine (Nicoderm CQ) 14 mg/24 hr patch [id number] Yes  Historical Provider, MD  Active   nitroglycerin (Nitrostat) 0.4 mg SL tablet [id number] Yes Place 1 tablet (0.4 mg) under the tongue every 5 minutes if needed for chest pain. May repeat dose every 5 minutes for up to 3 doses total. Vinicius Swan DO  Active   nystatin (Mycostatin) 100,000 unit/mL suspension [id number]  Take 5 mL (500,000 Units) by mouth 4 times a day.   Patient not taking: Reported on 11/15/2024    Historical Provider, MD  Active   potassium chloride CR 10 mEq ER tablet [id number] Yes Take by mouth. TAKE 1 TABLET BY MOUTH ONCE DAILY WITH FOOD FOR 90 DAYS Historical Provider, MD  Active   predniSONE (Deltasone) 10 mg tablet [id number] Yes Take 4 tabs a day for 3 days, then 3 a day for 3 days, then 2 a day for 3 days, then 1 a day for 3 days, and then 0.5 tab a day until gone Bao Thibodeaux DO  Active   Spiriva Respimat 2.5 mcg/actuation inhaler [id number] Yes Inhale 2 puffs once daily. Historical Provider, MD  Active   Tc-99m tetrofosmin (Myoview) injection 10.1 millicurie [id number]   Marily Jasmine MD  Active   Tc-99m tetrofosmin (Myoview) injection 30.5 millicurie [id number]   Vinicius Swan DO  Active   ticagrelor (Brilinta) 90 mg tablet [id number] Yes Take 1 tablet (90 mg) by mouth 2 times a day. Vinicius Swan DO  Active   Ventolin HFA 90 mcg/actuation inhaler [id number] Yes Inhale 2 puffs every 4 hours if needed. Historical Provider, MD  Active   Xhance 93 mcg/actuation aerosol breath activated [id number] Yes 1 puff by intranasal route 2 times a day. Use 1 puff twice daily to bilateral sides  Ravin Corral MD  Active                                 Medication compliance: {UH GEN YES NO WILDCARD WILDCARD:77859}   Uses pill box/organizer: {YES/NO:200010}   Carries medication list: {YES/NO:200010}    Blood Pressure Management  History of Hypertension: {YES/NO:200010}  Medication Changes: {YES/NO:200010}  Resting BP:  There were no vitals taken for this visit.     Heart Failure Management  Hx of Heart Failure: {UH CR/CT/VR HF Management:40270}    Smoking/Tobacco Assessment  Social History     Tobacco Use   Smoking Status Every Day    Current packs/day: 0.50    Types: Cigarettes   Smokeless Tobacco Never       Other Core Component Plan    Goal Status: {UH CR/CT/VR Goal Status:50233}    Other Core Component Goals: {UH CR/CT/VR Other Goals:22541}    Other Core Component Interventions/Education:   {UH CR/CT/VR Other Goals:55567}    { AMB CR/CT/VR Other Core Initial Reassess Discharge:53930}    Individual Patient Goals:    ***  ***    Goal Status: { CR/CT/VR Goal Status:82801}    Staff Comments:  ***    Rehab Staff Signature: Rosamaria Renteria RN

## 2024-11-22 ENCOUNTER — APPOINTMENT (OUTPATIENT)
Dept: CARDIAC REHAB | Facility: HOSPITAL | Age: 49
End: 2024-11-22
Payer: COMMERCIAL

## 2024-11-25 ENCOUNTER — APPOINTMENT (OUTPATIENT)
Dept: CARDIAC REHAB | Facility: HOSPITAL | Age: 49
End: 2024-11-25
Payer: COMMERCIAL

## 2024-11-25 DIAGNOSIS — Z95.5 S/P CORONARY ARTERY STENT PLACEMENT: ICD-10-CM

## 2024-11-27 ENCOUNTER — APPOINTMENT (OUTPATIENT)
Dept: CARDIAC REHAB | Facility: HOSPITAL | Age: 49
End: 2024-11-27
Payer: COMMERCIAL

## 2024-11-27 DIAGNOSIS — Z95.5 S/P CORONARY ARTERY STENT PLACEMENT: ICD-10-CM

## 2024-11-29 ENCOUNTER — APPOINTMENT (OUTPATIENT)
Dept: CARDIAC REHAB | Facility: HOSPITAL | Age: 49
End: 2024-11-29
Payer: COMMERCIAL

## 2024-12-02 ENCOUNTER — APPOINTMENT (OUTPATIENT)
Dept: CARDIAC REHAB | Facility: HOSPITAL | Age: 49
End: 2024-12-02
Payer: COMMERCIAL

## 2024-12-04 ENCOUNTER — APPOINTMENT (OUTPATIENT)
Dept: CARDIAC REHAB | Facility: HOSPITAL | Age: 49
End: 2024-12-04
Payer: COMMERCIAL

## 2024-12-04 DIAGNOSIS — Z95.5 S/P CORONARY ARTERY STENT PLACEMENT: ICD-10-CM

## 2024-12-04 PROCEDURE — 93798 PHYS/QHP OP CAR RHAB W/ECG: CPT | Performed by: INTERNAL MEDICINE

## 2024-12-06 ENCOUNTER — APPOINTMENT (OUTPATIENT)
Dept: CARDIAC REHAB | Facility: HOSPITAL | Age: 49
End: 2024-12-06
Payer: COMMERCIAL

## 2024-12-09 ENCOUNTER — APPOINTMENT (OUTPATIENT)
Dept: CARDIAC REHAB | Facility: HOSPITAL | Age: 49
End: 2024-12-09
Payer: COMMERCIAL

## 2024-12-11 ENCOUNTER — APPOINTMENT (OUTPATIENT)
Dept: CARDIAC REHAB | Facility: HOSPITAL | Age: 49
End: 2024-12-11
Payer: COMMERCIAL

## 2024-12-11 DIAGNOSIS — Z95.5 S/P CORONARY ARTERY STENT PLACEMENT: ICD-10-CM

## 2024-12-11 PROCEDURE — 93798 PHYS/QHP OP CAR RHAB W/ECG: CPT | Performed by: INTERNAL MEDICINE

## 2024-12-13 ENCOUNTER — APPOINTMENT (OUTPATIENT)
Dept: CARDIAC REHAB | Facility: HOSPITAL | Age: 49
End: 2024-12-13
Payer: COMMERCIAL

## 2024-12-13 DIAGNOSIS — Z95.5 S/P CORONARY ARTERY STENT PLACEMENT: ICD-10-CM

## 2024-12-13 PROCEDURE — 93798 PHYS/QHP OP CAR RHAB W/ECG: CPT | Performed by: INTERNAL MEDICINE

## 2024-12-16 ENCOUNTER — APPOINTMENT (OUTPATIENT)
Dept: CARDIAC REHAB | Facility: HOSPITAL | Age: 49
End: 2024-12-16
Payer: COMMERCIAL

## 2024-12-18 ENCOUNTER — APPOINTMENT (OUTPATIENT)
Dept: CARDIAC REHAB | Facility: HOSPITAL | Age: 49
End: 2024-12-18
Payer: COMMERCIAL

## 2024-12-20 ENCOUNTER — APPOINTMENT (OUTPATIENT)
Dept: CARDIAC REHAB | Facility: HOSPITAL | Age: 49
End: 2024-12-20
Payer: COMMERCIAL

## 2024-12-20 DIAGNOSIS — Z95.5 S/P CORONARY ARTERY STENT PLACEMENT: ICD-10-CM

## 2024-12-20 PROCEDURE — 93798 PHYS/QHP OP CAR RHAB W/ECG: CPT | Performed by: INTERNAL MEDICINE

## 2024-12-23 ENCOUNTER — APPOINTMENT (OUTPATIENT)
Dept: CARDIAC REHAB | Facility: HOSPITAL | Age: 49
End: 2024-12-23
Payer: COMMERCIAL

## 2024-12-23 ENCOUNTER — LAB (OUTPATIENT)
Dept: LAB | Facility: LAB | Age: 49
End: 2024-12-23
Payer: COMMERCIAL

## 2024-12-23 DIAGNOSIS — Z11.59 ENCOUNTER FOR SCREENING FOR OTHER VIRAL DISEASES: ICD-10-CM

## 2024-12-23 DIAGNOSIS — E87.6 HYPOKALEMIA: ICD-10-CM

## 2024-12-23 DIAGNOSIS — Z95.5 S/P CORONARY ARTERY STENT PLACEMENT: ICD-10-CM

## 2024-12-23 DIAGNOSIS — E78.00 PURE HYPERCHOLESTEROLEMIA: ICD-10-CM

## 2024-12-23 DIAGNOSIS — N18.30 CHRONIC KIDNEY DISEASE, STAGE 3 UNSPECIFIED (MULTI): Primary | ICD-10-CM

## 2024-12-23 LAB
ALBUMIN SERPL BCP-MCNC: 4.3 G/DL (ref 3.4–5)
ANION GAP SERPL CALC-SCNC: 8 MMOL/L (ref 10–20)
APPEARANCE UR: CLEAR
BILIRUB UR STRIP.AUTO-MCNC: NEGATIVE MG/DL
BUN SERPL-MCNC: 12 MG/DL (ref 6–23)
CALCIUM SERPL-MCNC: 9.5 MG/DL (ref 8.6–10.3)
CHLORIDE SERPL-SCNC: 106 MMOL/L (ref 98–107)
CHOLEST SERPL-MCNC: 99 MG/DL (ref 0–199)
CHOLESTEROL/HDL RATIO: 2.1
CO2 SERPL-SCNC: 29 MMOL/L (ref 21–32)
COLOR UR: COLORLESS
CREAT SERPL-MCNC: 1.38 MG/DL (ref 0.5–1.05)
CREAT UR-MCNC: 49.6 MG/DL (ref 20–320)
EGFRCR SERPLBLD CKD-EPI 2021: 47 ML/MIN/1.73M*2
GLUCOSE SERPL-MCNC: 105 MG/DL (ref 74–99)
GLUCOSE UR STRIP.AUTO-MCNC: NORMAL MG/DL
HCV AB SER QL: NONREACTIVE
HDLC SERPL-MCNC: 46.3 MG/DL
KETONES UR STRIP.AUTO-MCNC: NEGATIVE MG/DL
LDLC SERPL CALC-MCNC: 16 MG/DL
LEUKOCYTE ESTERASE UR QL STRIP.AUTO: NEGATIVE
MAGNESIUM SERPL-MCNC: 2.05 MG/DL (ref 1.6–2.4)
MICROALBUMIN UR-MCNC: <7 MG/L
MICROALBUMIN/CREAT UR: NORMAL MG/G{CREAT}
NITRITE UR QL STRIP.AUTO: NEGATIVE
NON HDL CHOLESTEROL: 53 MG/DL (ref 0–149)
PH UR STRIP.AUTO: 5 [PH]
PHOSPHATE SERPL-MCNC: 3.7 MG/DL (ref 2.5–4.9)
POTASSIUM SERPL-SCNC: 4.3 MMOL/L (ref 3.5–5.3)
PROT UR STRIP.AUTO-MCNC: NEGATIVE MG/DL
RBC # UR STRIP.AUTO: NEGATIVE /UL
SODIUM SERPL-SCNC: 139 MMOL/L (ref 136–145)
SP GR UR STRIP.AUTO: 1
TRIGL SERPL-MCNC: 183 MG/DL (ref 0–149)
UROBILINOGEN UR STRIP.AUTO-MCNC: NORMAL MG/DL
VLDL: 37 MG/DL (ref 0–40)

## 2024-12-23 PROCEDURE — 80069 RENAL FUNCTION PANEL: CPT

## 2024-12-23 PROCEDURE — 83735 ASSAY OF MAGNESIUM: CPT

## 2024-12-23 PROCEDURE — 82043 UR ALBUMIN QUANTITATIVE: CPT

## 2024-12-23 PROCEDURE — 80061 LIPID PANEL: CPT

## 2024-12-23 PROCEDURE — 86803 HEPATITIS C AB TEST: CPT

## 2024-12-23 PROCEDURE — 93798 PHYS/QHP OP CAR RHAB W/ECG: CPT | Performed by: INTERNAL MEDICINE

## 2024-12-23 PROCEDURE — 82570 ASSAY OF URINE CREATININE: CPT

## 2024-12-23 PROCEDURE — 81003 URINALYSIS AUTO W/O SCOPE: CPT

## 2024-12-27 ENCOUNTER — APPOINTMENT (OUTPATIENT)
Dept: CARDIAC REHAB | Facility: HOSPITAL | Age: 49
End: 2024-12-27
Payer: COMMERCIAL

## 2024-12-30 ENCOUNTER — APPOINTMENT (OUTPATIENT)
Dept: CARDIAC REHAB | Facility: HOSPITAL | Age: 49
End: 2024-12-30
Payer: MEDICARE

## 2024-12-30 DIAGNOSIS — Z95.5 S/P CORONARY ARTERY STENT PLACEMENT: ICD-10-CM

## 2024-12-30 PROCEDURE — 93798 PHYS/QHP OP CAR RHAB W/ECG: CPT | Performed by: INTERNAL MEDICINE

## 2025-01-06 ENCOUNTER — CLINICAL SUPPORT (OUTPATIENT)
Dept: CARDIAC REHAB | Facility: HOSPITAL | Age: 50
End: 2025-01-06
Payer: COMMERCIAL

## 2025-01-06 DIAGNOSIS — Z95.5 S/P CORONARY ARTERY STENT PLACEMENT: ICD-10-CM

## 2025-01-06 PROCEDURE — 93798 PHYS/QHP OP CAR RHAB W/ECG: CPT | Performed by: INTERNAL MEDICINE

## 2025-01-10 ENCOUNTER — CLINICAL SUPPORT (OUTPATIENT)
Dept: CARDIAC REHAB | Facility: HOSPITAL | Age: 50
End: 2025-01-10
Payer: MEDICARE

## 2025-01-10 DIAGNOSIS — Z95.5 S/P CORONARY ARTERY STENT PLACEMENT: ICD-10-CM

## 2025-01-10 PROCEDURE — 93798 PHYS/QHP OP CAR RHAB W/ECG: CPT | Performed by: INTERNAL MEDICINE

## 2025-01-20 ENCOUNTER — APPOINTMENT (OUTPATIENT)
Dept: DERMATOLOGY | Facility: CLINIC | Age: 50
End: 2025-01-20
Payer: COMMERCIAL

## 2025-01-20 DIAGNOSIS — L73.2 HIDRADENITIS SUPPURATIVA: ICD-10-CM

## 2025-01-20 DIAGNOSIS — Z12.83 SCREENING EXAM FOR SKIN CANCER: ICD-10-CM

## 2025-01-20 DIAGNOSIS — L30.8 OTHER ECZEMA: ICD-10-CM

## 2025-01-20 DIAGNOSIS — L82.1 SEBORRHEIC KERATOSIS: ICD-10-CM

## 2025-01-20 DIAGNOSIS — D22.9 NEVUS: Primary | ICD-10-CM

## 2025-01-20 DIAGNOSIS — L72.3 SEBACEOUS CYST: ICD-10-CM

## 2025-01-20 DIAGNOSIS — L81.4 LENTIGO: ICD-10-CM

## 2025-01-20 PROCEDURE — 99214 OFFICE O/P EST MOD 30 MIN: CPT | Performed by: NURSE PRACTITIONER

## 2025-01-20 RX ORDER — BENZOYL PEROXIDE 100 MG/ML
1 LIQUID TOPICAL DAILY
Qty: 237 G | Refills: 11 | Status: SHIPPED | OUTPATIENT
Start: 2025-01-20 | End: 2026-01-20

## 2025-01-20 RX ORDER — CLINDAMYCIN PHOSPHATE 10 UG/ML
LOTION TOPICAL 2 TIMES DAILY
Qty: 60 ML | Refills: 1 | Status: SHIPPED | OUTPATIENT
Start: 2025-01-20

## 2025-01-20 RX ORDER — TRIAMCINOLONE ACETONIDE 1 MG/G
CREAM TOPICAL
Qty: 80 G | Refills: 0 | Status: SHIPPED | OUTPATIENT
Start: 2025-01-20

## 2025-01-20 NOTE — PROGRESS NOTES
Subjective     Raegan Perez is a 49 y.o. female who presents for the following: Hidradenitis Suppurativa and Skin Check.   Follow up visit, last seen 11/2024 for   Hidradenitis suppurativa  Left Inframammary Fold, Pubic, Right Inframammary Fold  Inflammatory papules and abscesses to intermammary folds and bilateral groin.   Per last note patient was instructed at last visit to   Restart clindamycin 1% lotion daily  Restart BPO 10% wash daily    Patient in for yearly skin exam today as well.       Review of Systems:  No other skin or systemic complaints other than what is documented elsewhere in the note.    The following portions of the chart were reviewed this encounter and updated as appropriate:       Skin Cancer History  No skin cancer on file.    Specialty Problems          Dermatology Problems    Chloasma    Epidermal thickening, unspecified    Hemangioma of skin and subcutaneous tissue    Hidradenitis suppurativa    Melanocytic nevi of lower extremity or hip    Melanocytic nevi of scalp and neck    Melanocytic nevi of trunk    Melanocytic nevi of unspecified upper limb, including shoulder    Neoplasm of uncertain behavior of skin    Other follicular cysts of the skin and subcutaneous tissue    Other hypertrophic disorders of the skin    Other melanin hyperpigmentation    Other seborrheic keratosis    Skin eruption     Past Medical History:  Raegan Perez  has a past medical history of Arteritis, unspecified (CMS-HCC) (09/22/2016), Cafe au lait spots (10/27/2017), Candidiasis, unspecified (07/07/2020), Disorder of breast, unspecified (06/16/2020), Disorder of the skin and subcutaneous tissue, unspecified (11/28/2017), Disorder of the skin and subcutaneous tissue, unspecified (11/28/2017), Encounter for gynecological examination (general) (routine) with abnormal findings (01/12/2017), Encounter for gynecological examination (general) (routine) without abnormal findings (08/04/2020),  Encounter for gynecological examination (general) (routine) without abnormal findings (06/25/2019), Encounter for other preprocedural examination (03/19/2018), Encounter for screening mammogram for malignant neoplasm of breast (08/04/2020), Encounter for screening mammogram for malignant neoplasm of breast (06/25/2019), Left lower quadrant abdominal tenderness (05/07/2020), Menopausal and female climacteric states (10/27/2017), Menopausal and female climacteric states (09/15/2016), Menopausal and female climacteric states (01/11/2017), Noninfective gastroenteritis and colitis, unspecified (09/22/2016), Other chest pain (10/30/2019), Other conditions influencing health status (03/13/2020), Other skin changes (06/16/2020), Other specified noninflammatory disorders of vulva and perineum (07/20/2020), Pelvic and perineal pain, Pelvic and perineal pain (12/20/2016), Personal history of diseases of the blood and blood-forming organs and certain disorders involving the immune mechanism (09/22/2016), Personal history of nicotine dependence (03/19/2018), Personal history of other diseases of the circulatory system (12/16/2016), Personal history of other diseases of the digestive system (08/23/2021), Personal history of other diseases of the female genital tract, Personal history of other diseases of the female genital tract (10/27/2017), Personal history of other diseases of the female genital tract (12/20/2016), Personal history of other diseases of the female genital tract (01/11/2017), Personal history of other diseases of the female genital tract (09/20/2017), Personal history of other diseases of the respiratory system, Personal history of other diseases of the respiratory system (09/26/2019), Personal history of other diseases of urinary system, Personal history of other endocrine, nutritional and metabolic disease, Personal history of other medical treatment (01/11/2017), Personal history of other specified conditions  (01/26/2017), Personal history of other specified conditions (03/04/2019), Personal history of other specified conditions, Personal history of other specified conditions (09/22/2016), Personal history of other specified conditions (12/09/2019), Unspecified sprain of unspecified finger, initial encounter (09/26/2019), Unspecified symptoms and signs involving the genitourinary system, and Urinary tract infection, site not specified (07/10/2020).    Past Surgical History:  Raegan Perez  has a past surgical history that includes Other surgical history (09/15/2016); Other surgical history (09/24/2021); Cervical biopsy w/ loop electrode excision (01/11/2017); Other surgical history (03/04/2019); Other surgical history (03/04/2019); Other surgical history (01/11/2017); Other surgical history (09/24/2021); Other surgical history (09/24/2021); MR angio neck wo IV contrast (3/26/2019); MR angio head wo IV contrast (3/26/2019); Cardiac catheterization (N/A, 7/31/2024); and Cardiac catheterization (N/A, 7/31/2024).    Family History:  Patient family history includes Breast cancer in her mother; Coronary artery disease in her father; Hypertension in her father and sister; Memory loss in her father; Ovarian cancer in her sister; Seizures in her daughter; Skin cancer in her daughter and father; Stroke in her father; cardiac disorder in her father and sister.    Social History:  Raegan Perez  reports that she has been smoking cigarettes. She has never used smokeless tobacco. She reports that she does not drink alcohol and does not use drugs.    Allergies:  Adhesive tape-silicones, Cat dander, and Cephalexin    Current Medications / CAM's:    Current Outpatient Medications:     Advair -21 mcg/actuation inhaler, , Disp: , Rfl:     albuterol 2.5 mg /3 mL (0.083 %) nebulizer solution, INHALE 1 VIAL VIA NEBULIZER EVERY 4 HOURS AS NEEDED FOR WHEEZING  SHORTNESS OF BREATH  OR COUGH, Disp: , Rfl:      aMILoride (Midamor) 5 mg tablet, Take 1 tablet (5 mg) by mouth once daily., Disp: , Rfl:     aspirin 81 mg EC tablet, Take 1 tablet (81 mg) by mouth once daily., Disp: , Rfl:     azelastine (Astelin) 137 mcg (0.1 %) nasal spray, Administer 1 spray into each nostril 2 times a day., Disp: 30 mL, Rfl: 11    benzoyl peroxide (Benzac AC) 10 % external wash, Apply 1 Application topically once daily., Disp: 237 g, Rfl: 11    busPIRone (Buspar) 15 mg tablet, Take 1 tablet (15 mg) by mouth every 12 hours., Disp: , Rfl:     cholecalciferol (Vitamin D-3) 25 MCG (1000 UT) tablet, Take 1 tablet (25 mcg) by mouth once daily., Disp: , Rfl:     clindamycin (Cleocin T) 1 % lotion, Apply topically 2 times a day., Disp: 60 mL, Rfl: 1    clobetasol (Temovate) 0.05 % ointment, APPLY 1 APPLICATION 2 TIMES PER DAY AS NEEDED FOR 14 DAYS, Disp: , Rfl:     clonazePAM (KlonoPIN) 1 mg tablet, Take 0.5 tablets (0.5 mg) by mouth once daily., Disp: , Rfl:     evolocumab (Repatha SureClick) 140 mg/mL injection, Inject 1 mL (140 mg) under the skin every 14 (fourteen) days., Disp: 6 mL, Rfl: 3    fenofibrate (Tricor) 145 mg tablet, TAKE ONE TABLET BY MOUTH EVERY DAY, Disp: 90 tablet, Rfl: 3    icosapent ethyL (Vascepa) 1 gram capsule, Take 2 capsules (2 g) by mouth 2 times a day., Disp: 360 capsule, Rfl: 3    lamoTRIgine (LaMICtal) 100 mg tablet, Take 1 tablet (100 mg) by mouth once daily., Disp: , Rfl:     metoprolol succinate XL (Toprol-XL) 25 mg 24 hr tablet, Take 0.5 tablets (12.5 mg) by mouth once daily., Disp: 45 tablet, Rfl: 3    nicotine (Nicoderm CQ) 14 mg/24 hr patch, , Disp: , Rfl:     nitroglycerin (Nitrostat) 0.4 mg SL tablet, Place 1 tablet (0.4 mg) under the tongue every 5 minutes if needed for chest pain. May repeat dose every 5 minutes for up to 3 doses total., Disp: 25 tablet, Rfl: 11    nystatin (Mycostatin) 100,000 unit/mL suspension, Take 5 mL (500,000 Units) by mouth 4 times a day. (Patient not taking: Reported on 11/15/2024),  Disp: , Rfl:     omeprazole (PriLOSEC) 20 mg DR capsule, Take 1 capsule (20 mg) by mouth once daily in the morning. Take before meals. Do not crush or chew., Disp: 30 capsule, Rfl: 1    potassium chloride CR 10 mEq ER tablet, Take by mouth. TAKE 1 TABLET BY MOUTH ONCE DAILY WITH FOOD FOR 90 DAYS, Disp: , Rfl:     predniSONE (Deltasone) 10 mg tablet, Take 4 tabs a day for 3 days, then 3 a day for 3 days, then 2 a day for 3 days, then 1 a day for 3 days, and then 0.5 tab a day until gone, Disp: 32 tablet, Rfl: 0    sennosides (senna) 8.6 mg capsule, Take 2 capsules (17.2 mg) by mouth 2 times a day., Disp: 60 capsule, Rfl: 11    simethicone (Gas-Ex) 125 mg tablet tablet, Take 1 tablet (125 mg) by mouth 4 times a day as needed (Bloating)., Disp: 120 tablet, Rfl: 11    Spiriva Respimat 2.5 mcg/actuation inhaler, Inhale 2 puffs once daily., Disp: , Rfl:     ticagrelor (Brilinta) 90 mg tablet, Take 1 tablet (90 mg) by mouth 2 times a day., Disp: 180 tablet, Rfl: 3    Ventolin HFA 90 mcg/actuation inhaler, Inhale 2 puffs every 4 hours if needed., Disp: , Rfl:     Xhance 93 mcg/actuation aerosol breath activated, 1 puff by intranasal route 2 times a day. Use 1 puff twice daily to bilateral sides, Disp: 16 mL, Rfl: 11    Current Facility-Administered Medications:     Tc-99m tetrofosmin (Myoview) injection 10.1 millicurie, 10.1 millicurie, intravenous, Once in imaging, Vinicius Swan,     Tc-99m tetrofosmin (Myoview) injection 30.5 millicurie, 30.5 millicurie, intravenous, Once in imaging, Vinicius Swan, DO     Objective   Well appearing patient in no apparent distress; mood and affect are within normal limits.      Assessment/Plan   1. Nevus  Uniform pigmented macule(s)/papule(s) with reassuring findings on dermoscopy    -Discussed nature of condition  -Reassurance, benign-appearing features on examination today  -Recommend continued observation    2. Lentigo  Tan macules    -Benign appearing on exam  -Reassurance,  Speaking Coherently recommend observation    3. Seborrheic keratosis  Stuck on, waxy macule(s)/papule(s)/plaque(s) with comedo-like openings and milia like cysts    -Discussed nature of condition  -Reassurance, recommend continued observation    4. Screening exam for skin cancer    Follow Up In Dermatology    5. Sebaceous cyst  Left Anterior Neck  1 cm Subcutaneous nodule(s) with normal-appearing overlying skin    -Discussed nature of the condition  -Patient would like to have excised.  We discussed these often reoccur and coverage under insurance is variable.  She states it is painful to sleep with her c-pap strap as it can rub.    -No active drainage today.  ILK 0.1ml of 10mg/ml administered intra lesionally today.       6. Hidradenitis suppurativa  Left Inframammary Fold, Pubic, Right Inframammary Fold  Inflammatory papules and abscesses to intermammary folds and bilateral groin.     Continue clindamycin 1% lotion daily  Continue BPO 10% wash daily      Related Medications  clindamycin (Cleocin T) 1 % lotion  Apply topically 2 times a day.    benzoyl peroxide (Benzac AC) 10 % external wash  Apply 1 Application topically once daily.    7. Other eczema  Right Abdomen (side) - Lower  Erythematous macule(s)/patch(es)/papule(s) with xerotic scale      -Discussed nature of condition  -Discussed gentle skin care habits including using gentle soap such as Dove or Cetaphil to cleanse the skin.   -Recommend to avoid harsh cleansers/soaps such as: Dial, Lever 2000, [place].  -Recommend to use emollients twice daily, once immediately after the shower while the skin is still damp.   -Recommended emollients include: Aveeno Eczema Therapy or Daily Moisturizer, La Roche Posay Lipikar AP Blam, or plain Vaseline.   -Recommend to avoid hot water/showers; lukewarm or cool water/showers will be beneficial.   -Recommend:

## 2025-02-10 ENCOUNTER — CLINICAL SUPPORT (OUTPATIENT)
Dept: CARDIAC REHAB | Facility: HOSPITAL | Age: 50
End: 2025-02-10
Payer: COMMERCIAL

## 2025-02-10 DIAGNOSIS — Z95.5 S/P CORONARY ARTERY STENT PLACEMENT: ICD-10-CM

## 2025-02-10 PROCEDURE — 93798 PHYS/QHP OP CAR RHAB W/ECG: CPT | Performed by: INTERNAL MEDICINE

## 2025-02-11 NOTE — PROGRESS NOTES
Cardiac Rehabilitation  Individual  Treatment  Plan   -   180 Day Reassessment 2/14/2025    Name: Raegan Perez  Medical Record Number: [id number]  YOB: 1975  Age: 49 y.o.    Today’s Date: 2/11/2025  Primary Care Physician: Bao Thibodeaux DO  Referring Physician: Vinicius Swan DO  Program Location: [facility] CARDIAC REHAB     General  Primary Diagnosis: PCI  1. S/P coronary artery stent placement  Follow Up In Cardiac Rehab       2.     PCI    Onset/Date of Diagnosis: 7/31/24       CR/NM/VR Session # attended:   29 Sessions Attended.  Patient has intermittent Attendance    AACVPR Risk Stratification:   High    Falls Risk: Low  Patient instructed to attach safety clip to waistband while on the treadmill the first class and at each session.     Psychosocial Assessment     Pre PH-Q 9 survey score:  11  Post PH-Q9 survey score: To be done at disharge.    Sent PH-Q 9 to MD if score > 20: N/A    Pt reported/currently experiencing stress: Yes; Anxiety; Severity: mild and Depression; Severity: mild  Patient uses stress management skills: Yes   History of:  Anxiety and depression  Currently seeing a mental health provider: Yes, Then provider name: Dr. Hector Dominguez  Social Support: Yes, Whom:Spouse, daughter  Quality of Life Survey: SF-36   SF-36 Pre Post   Physical Component Score 38.09 TBD   Mental Component Score 32.36 TBD   General Health Score    28.15 TBD           Learning Assessment:  Learning assessment/barriers: Emotional  Preferred learning method: Auditory, Visual, and Writing handout  Barriers: None  Comments: Patient is ready and willing to learn    Knowledge Assessment /Survey:  Pre survey score:  9     Post survey score: TBD at discharge    Stages of Change: Action    Psychosocial Plan    Goal Status: In Progress    Psychosocial Goals: Maintain or lower PH-Q 9 score by discharge and Maintain or improve Post SF-36 Quality of Life Survey.    Psychosocial  "Interventions/Education:   Patient follows curently with a Psychiatrist Dr. Hector Dominguez and is on Psychosocial medications for Bi-Polar disease per patient.  PHQ9 reviewed with patient  Pt attended Stress and Relaxation education        AMB CR/KS/VR Psychosocial  -  180 Day Reassessment: 2/14/2025  Patient has H/O Bi-Polar disorder.  PH Q-9 reviewed with pt and the Out patient counseling resource sheet was provided.    Nutrition Assessment:    Hyperlipidemia: Yes     Lipids:   Lab Results   Component Value Date    CHOL 99 12/23/2024    HDL 46.3 12/23/2024    LDLF 29 04/14/2023    TRIG 183 (H) 12/23/2024       Current Dietary Guidelines:  Regular diet  Barriers to dietary change: no    Diet Habit Survey: [place] Dietary Assessment  Pre survey score: 61%  Post survey score: To be done at discharge.    Diabetes Assessment    Lab Results   Component Value Date    HGBA1C 5.7 05/06/2024       History of Diabetes: No    Weight Management:    PRE - Weight 151.2 lbs          Ht: 64 inches      PRE - BMI:25.9  Waist Circ  PRE: 37.5 in    POST - Weight TBD lbs          Ht: 64 inches      POST- BMI:  TBD  Waist Circ  POST: TBD    Nutrition Plan    Goal Status: In Progress    Nutrition Goals:  Achieve a score of 80% or greater on Post [place] Dietary Assessment.                               Attend at least 50% of education lectures provided.                               Weight loss of 5lbs by completion of program.    Nutrition Interventions/Education:   AHA handout \"What is angina\"? And \"What is Cardiac Rehabilitation\"?  Lipid profile reviewed during initial phone interview  Referral to Outpatient Nutrition Therapy. Patient states her insurance company has a Dietitian that she sets up an appointment with.  Pt attended Fiber and Plant Based Eating ( Dietitian) 10/2/24     AMB CR/KS/VR Nutrition  -  180 Day Reassessment 2/14/2025  Provided Healthy Eating Tip Sheets and reviewed areas with lowest scores for improvement " with patient.    Exercise Assessment    None, Sedentary  Mode: NA  Frequency: NA  Duration: NA    Exercise Prescription     Exercise Prescription based on: Duke Activity Status Index (DASI) and Health History    DASI Score: 23.45  MET Score: 2.8  (Score divided by 2)     Frequency:  3 days/week   Mode: Treadmill, Nu-step,/Nu-step (T),Airdyne,Arm Ergometer   Duration: 45 total aerobic minutes   Intensity: RPE 11-15  Target HR:  119-139  MET Level: 2.8 max starting  Patient wears supplemental O2: No     Modality Workload METs Duration (minutes)   1 Pre-Exercise/Rest -- -- 5:00   2 NuStep 4000 110 colon, Level 6 4.2 15 :00   3 NuStep T5 90 colon, Level 6  4.2 15:00   4 Treadmill 3.0 mph, 2.5% incline   4.4 15 :00   5 Airdyne 1.0 Load,  25-30 Rpm 3.1 15 :00   6 Arm Ergometer 18 Colon, Level 2 2.4 15:00   7 Post-Exercise/Rest -- -- 5:00     Resistance Training:  NA  Home Exercise Prescription given: yes      Exercise Plan    Goal Status:  In Progress    Exercise Goals: Obtain 150 minutes/week of moderate intensity aerobic exercise                             Exercise frequency 5-7 days/week.    Exercise progression:  Maintained exercise duration at 45 minutes.  No change to exercise intensity but reviewed, PT HR/RPE appropriate  Patient has intermittent attendance at times due to her medical history. HR elevated at times due to BAIRES.     Exercise Interventions/Education:   Has been given bi-weekly rx reviewed with pt and ex log given  Home Ex Rx reviewed with pt and Ex log given    [facility] CR/NM/VR Exercise  -  180 Day Reassessment:  2/142025  Patient has HX: of POTS with increased heart rate. Will progress slowly on TM for safety.      Other Core Components/Risk Factor Assessment:    Medication adherence    Current Medications:   Medication Documentation Review Audit       Reviewed by Kasandra Prater LPN (Licensed Nurse) on 01/20/25 at 0952      Medication Order Taking? Sig Documenting Provider Last Dose Status    Advair -21 mcg/actuation inhaler [id number] No  Historical Provider, MD Taking Active   albuterol 2.5 mg /3 mL (0.083 %) nebulizer solution [id number] No INHALE 1 VIAL VIA NEBULIZER EVERY 4 HOURS AS NEEDED FOR WHEEZING  SHORTNESS OF BREATH  OR COUGH Historical Provider, MD Taking Active   aMILoride (Midamor) 5 mg tablet [id number] No Take 1 tablet (5 mg) by mouth once daily. Historical Provider, MD Taking Active   aspirin 81 mg EC tablet [id number]  Take 1 tablet (81 mg) by mouth once daily. Historical Provider, MD  Active   azelastine (Astelin) 137 mcg (0.1 %) nasal spray [id number]  Administer 1 spray into each nostril 2 times a day. Ravin Corral MD  Active   benzoyl peroxide (Benzac AC) 10 % external wash [id number] No Apply 1 Application topically once daily. VÍCTOR Wayne Taking Active   busPIRone (Buspar) 15 mg tablet [id number] No Take 1 tablet (15 mg) by mouth every 12 hours. Historical Provider, MD Taking Active   cholecalciferol (Vitamin D-3) 25 MCG (1000 UT) tablet [id number] No Take 1 tablet (25 mcg) by mouth once daily. Historical Provider, MD Taking Active   clindamycin (Cleocin T) 1 % lotion [id number] No Apply topically 2 times a day. VÍCTOR Wayne Taking Active   clobetasol (Temovate) 0.05 % ointment [id number] No APPLY 1 APPLICATION 2 TIMES PER DAY AS NEEDED FOR 14 DAYS Historical Provider, MD Taking Active   clonazePAM (KlonoPIN) 1 mg tablet [id number] No Take 0.5 tablets (0.5 mg) by mouth once daily. Historical Provider, MD Taking Active   evolocumab (Repatha SureClick) 140 mg/mL injection [id number] No Inject 1 mL (140 mg) under the skin every 14 (fourteen) days. Vinicius Swan DO Taking Active   fenofibrate (Tricor) 145 mg tablet [id number] No TAKE ONE TABLET BY MOUTH EVERY DAY Vinicius Swan DO Taking Active   icosapent ethyL (Vascepa) 1 gram capsule [id number] No Take 2 capsules (2 g) by mouth 2 times a day. Vinicius Swan, DO Taking Active   lamoTRIgine  (LaMICtal) 100 mg tablet [id number] No Take 1 tablet (100 mg) by mouth once daily. Historical Provider, MD Taking Active   metoprolol succinate XL (Toprol-XL) 25 mg 24 hr tablet [id number] No Take 0.5 tablets (12.5 mg) by mouth once daily. Vinicius Swan,  Taking  10/04/24 9119   nicotine (Nicoderm CQ) 14 mg/24 hr patch [id number] No  Historical Provider, MD Taking Active   nitroglycerin (Nitrostat) 0.4 mg SL tablet [id number] No Place 1 tablet (0.4 mg) under the tongue every 5 minutes if needed for chest pain. May repeat dose every 5 minutes for up to 3 doses total. Vinicius Swan DO Taking Active   nystatin (Mycostatin) 100,000 unit/mL suspension [id number] No Take 5 mL (500,000 Units) by mouth 4 times a day.   Patient not taking: Reported on 11/15/2024    Historical Provider, MD Taking Active   omeprazole (PriLOSEC) 20 mg DR capsule [id number]  Take 1 capsule (20 mg) by mouth once daily in the morning. Take before meals. Do not crush or chew. Kole Plasencia MD  Active   potassium chloride CR 10 mEq ER tablet [id number] No Take by mouth. TAKE 1 TABLET BY MOUTH ONCE DAILY WITH FOOD FOR 90 DAYS Historical Provider, MD Taking Active   predniSONE (Deltasone) 10 mg tablet [id number]  Take 4 tabs a day for 3 days, then 3 a day for 3 days, then 2 a day for 3 days, then 1 a day for 3 days, and then 0.5 tab a day until gone Bao Thibodeaux,   Active   sennosides (senna) 8.6 mg capsule [id number]  Take 2 capsules (17.2 mg) by mouth 2 times a day. Kole Plasnecia MD  Active   simethicone (Gas-Ex) 125 mg tablet tablet [id number]  Take 1 tablet (125 mg) by mouth 4 times a day as needed (Bloating). Kole Plasencia MD  Active   Spiriva Respimat 2.5 mcg/actuation inhaler [id number] No Inhale 2 puffs once daily. Historical Provider, MD Taking Active   Tc-99m tetrofosmin (Myoview) injection 10.1 millicurie [id number]   Marily Jasmine MD  Active   Tc-99m tetrofosmin (Myoview) injection 30.5 millicurie [id number]   Vinicius Swan,  DO  Active   ticagrelor (Brilinta) 90 mg tablet [id number] No Take 1 tablet (90 mg) by mouth 2 times a day. Vinicius Swan, DO Taking Active   Ventolin HFA 90 mcg/actuation inhaler [id number] No Inhale 2 puffs every 4 hours if needed. Historical Provider, MD Taking Active   Xhance 93 mcg/actuation aerosol breath activated [id number]  1 puff by intranasal route 2 times a day. Use 1 puff twice daily to bilateral sides Ravin Corral MD  Active                 PATIENT STATES THE PLAVIX WAS DISCONTINUED AND SHE WAS STARTED ON BRILINTA 90MG TWICE DAILY, AND IS NOT USING THE NICODERM PATCH AND NO MENTION OF MYOVIEW INJECTIONS.    Allergies: Adhesive tape; itching, blisters, skin peeling                  Cephalexin; face turns bright red and feels like on fire                  Silicone ?                Medication compliance: Yes   Uses pill box/organizer: Yes    Carries medication list: Yes     Blood Pressure Management  History of Hypertension: No   Medication Changes: Yes PLAVIX DISCONTINUED AND BRILINTA 90MG TWICE DAILY STARTED.    VITALS TAKEN AT OFFICE VISIT ON 8/9/24:    /70 Lt. Arm           SpO2 96%       Pulse 95    (See session reports for  ALL  V.S.  documented  during class sessions)      Heart Failure Management  Hx of Heart Failure: No      Smoking/Tobacco Assessment  Social History     Tobacco Use   Smoking Status Every Day    Current packs/day: 0.50    Types: Cigarettes   Smokeless Tobacco Never   Smokes 10 cigarettes/day. Patient states she is not ready to set tobacco cessation date.    Anyone in the home smoke: NO      Other Core Component Plan    Goal Status: In Progress    Other Core Component Goals: Achieve resting BP of < 130/80 by discharge and Abstaining from tobacco 2 hours prior to class, knowledge on medication usage and actions by discharge, patient carries updated list of medications with them at all times,refer to tobacco cessation program, set tobacco quit date while enrolled in the  "program, smoking cessation by completion of program.    Other Core Component Interventions/Education:   # of cigarettes smoked = 10 per day, BP readings taken pre and post exercise at each session.  Patient continues to smoke/quit goal and reasons for continuing discussed: Patient states she is not ready to set quit date yet. Patient given [facility] educational handout \"How Do I Manage My Medications\"?, Patient given \"How To be a Quitter\" Smoking cessation booklet.  Pt attended education  on Cardiac Medications provided by the Pharmacists   Pt attended Stents and Stress Testing, Heart Attacks,   11/8/24 Patient states she is still smoking 5- 10 cigarettes/day. States she has been under stress since father passed away, which has made it difficult to cut back on smoking. States cannot use the nicotine patch due to be allergic to adhesive.          [facility] AMB CR/[place]/VR Other Core Component  -  180 Day Reassessment:   2/14/2025       Educational Classes:   Cardiac Medications 8/28/2024, Stents 9/4/2024, Stress Testing 9/11/2024, CAD Risk Factors 9/25/24, Fiber+Plant Based Eating (Dietitian) 10/2/24, Heart Attacks 10/16/24, Cardiac Medications 11/20/24, Stress and Relaxation 12/4/24, Stress Testing 12/11/24,      Individual Patient Goals:    Establish home exercise program 3-5 days/week for 30-60 minutes daily by completion of program.  Goal Status: In Progress    Become comfortable with my exercise program by completion of program.  Goal Status: In Progress    Weight loss of 5lbs by completion of program.  Goal Status: In Progress      Staff Comments:  Patient has done well in the program so far. She has had intermittent attendance but shows the ability to handle increased workloads as previously changed. No issues or complaints to note. Will continue to monitor and progress as She can tolerate.  Patient has inconsistent attendance.    Rehab Staff Signature: Rosamaria Renteria RN      PHYSICIAN REVIEW AND RESPONSE:  Please check " appropriate boxes and sign    __X_ The participant may enroll in the Cardiac Rehabilitation Program with the above individualized treatment plan (ITP)  ____ Make the following changes to the ITP/Exercise Prescription:

## 2025-02-12 ENCOUNTER — CLINICAL SUPPORT (OUTPATIENT)
Dept: CARDIAC REHAB | Facility: HOSPITAL | Age: 50
End: 2025-02-12
Payer: COMMERCIAL

## 2025-02-12 DIAGNOSIS — Z95.5 S/P CORONARY ARTERY STENT PLACEMENT: ICD-10-CM

## 2025-02-12 PROCEDURE — 93798 PHYS/QHP OP CAR RHAB W/ECG: CPT | Performed by: INTERNAL MEDICINE

## 2025-02-14 ENCOUNTER — CLINICAL SUPPORT (OUTPATIENT)
Dept: CARDIAC REHAB | Facility: HOSPITAL | Age: 50
End: 2025-02-14
Payer: COMMERCIAL

## 2025-02-14 DIAGNOSIS — Z95.5 S/P CORONARY ARTERY STENT PLACEMENT: ICD-10-CM

## 2025-02-14 PROCEDURE — 93798 PHYS/QHP OP CAR RHAB W/ECG: CPT | Performed by: INTERNAL MEDICINE

## 2025-02-21 ENCOUNTER — CLINICAL SUPPORT (OUTPATIENT)
Dept: CARDIAC REHAB | Facility: HOSPITAL | Age: 50
End: 2025-02-21
Payer: MEDICARE

## 2025-02-21 DIAGNOSIS — Z95.5 S/P CORONARY ARTERY STENT PLACEMENT: ICD-10-CM

## 2025-02-28 ENCOUNTER — APPOINTMENT (OUTPATIENT)
Dept: CARDIAC REHAB | Facility: HOSPITAL | Age: 50
End: 2025-02-28
Payer: COMMERCIAL

## 2025-02-28 DIAGNOSIS — Z95.5 S/P CORONARY ARTERY STENT PLACEMENT: ICD-10-CM

## 2025-03-03 ENCOUNTER — APPOINTMENT (OUTPATIENT)
Dept: CARDIAC REHAB | Facility: HOSPITAL | Age: 50
End: 2025-03-03
Payer: COMMERCIAL

## 2025-03-05 ENCOUNTER — APPOINTMENT (OUTPATIENT)
Dept: CARDIAC REHAB | Facility: HOSPITAL | Age: 50
End: 2025-03-05
Payer: COMMERCIAL

## 2025-03-05 DIAGNOSIS — Z95.5 S/P CORONARY ARTERY STENT PLACEMENT: ICD-10-CM

## 2025-03-07 ENCOUNTER — APPOINTMENT (OUTPATIENT)
Dept: CARDIAC REHAB | Facility: HOSPITAL | Age: 50
End: 2025-03-07
Payer: COMMERCIAL

## 2025-03-10 ENCOUNTER — APPOINTMENT (OUTPATIENT)
Dept: CARDIAC REHAB | Facility: HOSPITAL | Age: 50
End: 2025-03-10
Payer: COMMERCIAL

## 2025-03-12 ENCOUNTER — APPOINTMENT (OUTPATIENT)
Dept: CARDIAC REHAB | Facility: HOSPITAL | Age: 50
End: 2025-03-12
Payer: COMMERCIAL

## 2025-03-12 DIAGNOSIS — Z95.5 S/P CORONARY ARTERY STENT PLACEMENT: ICD-10-CM

## 2025-03-14 ENCOUNTER — APPOINTMENT (OUTPATIENT)
Dept: CARDIAC REHAB | Facility: HOSPITAL | Age: 50
End: 2025-03-14
Payer: COMMERCIAL

## 2025-03-17 ENCOUNTER — APPOINTMENT (OUTPATIENT)
Dept: CARDIAC REHAB | Facility: HOSPITAL | Age: 50
End: 2025-03-17
Payer: COMMERCIAL

## 2025-03-19 ENCOUNTER — APPOINTMENT (OUTPATIENT)
Dept: CARDIAC REHAB | Facility: HOSPITAL | Age: 50
End: 2025-03-19
Payer: COMMERCIAL

## 2025-03-21 ENCOUNTER — APPOINTMENT (OUTPATIENT)
Dept: CARDIAC REHAB | Facility: HOSPITAL | Age: 50
End: 2025-03-21
Payer: COMMERCIAL

## 2025-03-24 ENCOUNTER — APPOINTMENT (OUTPATIENT)
Dept: CARDIAC REHAB | Facility: HOSPITAL | Age: 50
End: 2025-03-24
Payer: COMMERCIAL

## 2025-03-26 ENCOUNTER — APPOINTMENT (OUTPATIENT)
Dept: CARDIAC REHAB | Facility: HOSPITAL | Age: 50
End: 2025-03-26
Payer: COMMERCIAL

## 2025-03-27 ENCOUNTER — APPOINTMENT (OUTPATIENT)
Dept: PRIMARY CARE | Facility: CLINIC | Age: 50
End: 2025-03-27
Payer: COMMERCIAL

## 2025-03-27 VITALS
BODY MASS INDEX: 25.71 KG/M2 | OXYGEN SATURATION: 94 % | WEIGHT: 150.6 LBS | DIASTOLIC BLOOD PRESSURE: 74 MMHG | SYSTOLIC BLOOD PRESSURE: 117 MMHG | TEMPERATURE: 96.3 F | HEIGHT: 64 IN | HEART RATE: 97 BPM

## 2025-03-27 DIAGNOSIS — N18.31 STAGE 3A CHRONIC KIDNEY DISEASE (MULTI): ICD-10-CM

## 2025-03-27 DIAGNOSIS — F31.60 BIPOLAR AFFECTIVE DISORDER, CURRENT EPISODE MIXED, CURRENT EPISODE SEVERITY UNSPECIFIED (MULTI): ICD-10-CM

## 2025-03-27 DIAGNOSIS — G47.33 OBSTRUCTIVE SLEEP APNEA, ADULT: ICD-10-CM

## 2025-03-27 DIAGNOSIS — R73.9 HYPERGLYCEMIA: ICD-10-CM

## 2025-03-27 DIAGNOSIS — I25.118 CORONARY ARTERY DISEASE OF NATIVE HEART WITH STABLE ANGINA PECTORIS, UNSPECIFIED VESSEL OR LESION TYPE: Primary | ICD-10-CM

## 2025-03-27 DIAGNOSIS — J44.9 CHRONIC OBSTRUCTIVE PULMONARY DISEASE, UNSPECIFIED COPD TYPE (MULTI): ICD-10-CM

## 2025-03-27 LAB — POC HEMOGLOBIN A1C: 5.4 % (ref 4.2–6.5)

## 2025-03-27 PROCEDURE — 99213 OFFICE O/P EST LOW 20 MIN: CPT | Performed by: INTERNAL MEDICINE

## 2025-03-27 PROCEDURE — 83036 HEMOGLOBIN GLYCOSYLATED A1C: CPT | Performed by: INTERNAL MEDICINE

## 2025-03-27 PROCEDURE — G2211 COMPLEX E/M VISIT ADD ON: HCPCS | Performed by: INTERNAL MEDICINE

## 2025-03-27 PROCEDURE — 3008F BODY MASS INDEX DOCD: CPT | Performed by: INTERNAL MEDICINE

## 2025-03-27 ASSESSMENT — ENCOUNTER SYMPTOMS
NERVOUS/ANXIOUS: 0
PALPITATIONS: 0
EYE PAIN: 0
SORE THROAT: 0
ABDOMINAL PAIN: 0
NAUSEA: 0
BLOOD IN STOOL: 0
COUGH: 0
DIARRHEA: 0
DYSURIA: 0
UNEXPECTED WEIGHT CHANGE: 0
JOINT SWELLING: 0
CHILLS: 0
FREQUENCY: 0
SLEEP DISTURBANCE: 0
TROUBLE SWALLOWING: 0
WHEEZING: 0
SHORTNESS OF BREATH: 0
CONFUSION: 0
WEAKNESS: 0
SEIZURES: 0
APPETITE CHANGE: 0
BACK PAIN: 0
VOMITING: 0
EYE DISCHARGE: 0
WOUND: 0
NUMBNESS: 0
TREMORS: 0
HEMATURIA: 0
FEVER: 0
DIZZINESS: 0
HEADACHES: 0
FLANK PAIN: 0
CONSTIPATION: 0

## 2025-03-27 ASSESSMENT — PATIENT HEALTH QUESTIONNAIRE - PHQ9
1. LITTLE INTEREST OR PLEASURE IN DOING THINGS: NOT AT ALL
SUM OF ALL RESPONSES TO PHQ9 QUESTIONS 1 AND 2: 0
2. FEELING DOWN, DEPRESSED OR HOPELESS: NOT AT ALL

## 2025-03-27 ASSESSMENT — PAIN SCALES - GENERAL: PAINLEVEL_OUTOF10: 0-NO PAIN

## 2025-03-27 NOTE — ASSESSMENT & PLAN NOTE
"-established with nephro dr Farnsworth  - on Amiloride and K   - GFR 47   - s/p kidney biopsy -\" had hemodynamic insult leading to ATN. her creatinine peaked at 3.2 .she had a renal Biopsy showing FGGS, mild ATN, mild atrophy and interstitial fibrosis. she had Moderate arteriolar hyalinosis.\"       "

## 2025-03-27 NOTE — ASSESSMENT & PLAN NOTE
- chronic, h/o STEMI, stents x 2 , s/p cardiac rehab  - on ASA, beta-blocker ,Brillinta, OFF statin ( not working)  - on Vascepa, Tricor, Repatha  - She had a heart attack on 7/31/24 and needed diagonal stent. she had a previous LAD stent.

## 2025-03-27 NOTE — ASSESSMENT & PLAN NOTE
- recent FLU like illness-recovered  - uses Nebs, rescue INH and Advair and Spiriva   - still smoking unfortunately  -Has as needed prednisone kit
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
## 2025-03-28 ENCOUNTER — APPOINTMENT (OUTPATIENT)
Dept: CARDIAC REHAB | Facility: HOSPITAL | Age: 50
End: 2025-03-28
Payer: COMMERCIAL

## 2025-03-31 ENCOUNTER — APPOINTMENT (OUTPATIENT)
Dept: CARDIAC REHAB | Facility: HOSPITAL | Age: 50
End: 2025-03-31
Payer: COMMERCIAL

## 2025-04-02 ENCOUNTER — APPOINTMENT (OUTPATIENT)
Dept: CARDIAC REHAB | Facility: HOSPITAL | Age: 50
End: 2025-04-02
Payer: COMMERCIAL

## 2025-04-04 ENCOUNTER — APPOINTMENT (OUTPATIENT)
Dept: CARDIAC REHAB | Facility: HOSPITAL | Age: 50
End: 2025-04-04
Payer: COMMERCIAL

## 2025-04-07 ENCOUNTER — APPOINTMENT (OUTPATIENT)
Dept: CARDIAC REHAB | Facility: HOSPITAL | Age: 50
End: 2025-04-07
Payer: COMMERCIAL

## 2025-04-09 ENCOUNTER — APPOINTMENT (OUTPATIENT)
Dept: CARDIAC REHAB | Facility: HOSPITAL | Age: 50
End: 2025-04-09
Payer: COMMERCIAL

## 2025-04-11 ENCOUNTER — APPOINTMENT (OUTPATIENT)
Dept: CARDIAC REHAB | Facility: HOSPITAL | Age: 50
End: 2025-04-11
Payer: COMMERCIAL

## 2025-04-14 ENCOUNTER — APPOINTMENT (OUTPATIENT)
Dept: CARDIAC REHAB | Facility: HOSPITAL | Age: 50
End: 2025-04-14
Payer: COMMERCIAL

## 2025-04-16 ENCOUNTER — APPOINTMENT (OUTPATIENT)
Dept: CARDIAC REHAB | Facility: HOSPITAL | Age: 50
End: 2025-04-16
Payer: COMMERCIAL

## 2025-05-05 ENCOUNTER — APPOINTMENT (OUTPATIENT)
Dept: CARDIOLOGY | Facility: CLINIC | Age: 50
End: 2025-05-05
Payer: COMMERCIAL

## 2025-09-29 ENCOUNTER — APPOINTMENT (OUTPATIENT)
Dept: PRIMARY CARE | Facility: CLINIC | Age: 50
End: 2025-09-29
Payer: COMMERCIAL

## 2026-01-26 ENCOUNTER — APPOINTMENT (OUTPATIENT)
Dept: DERMATOLOGY | Facility: CLINIC | Age: 51
End: 2026-01-26
Payer: COMMERCIAL

## (undated) DEVICE — CATHETER, BALLOON, NC EUPHORA NONCOMPLIANT 2.5 X 8 X 142CM

## (undated) DEVICE — CATHETER, OPTITORQUE, 5FR, TIG, 1H/100CM

## (undated) DEVICE — DEVICE KIT, INFLATION, CUSTOM, PARMA

## (undated) DEVICE — CATHETER, EXPO, MODEL-D, 6FR PIG 110CM

## (undated) DEVICE — CUSTOM NAMIC STANDARD HEART CONVIENCE KIT W/ INTEGRATED COMPENSATOR MANIFOLD FOR UH/PARMA MEDICAL CENTER

## (undated) DEVICE — CATHETER, BALLOON, EMERGE, PTCA, 15 X 2.0MM

## (undated) DEVICE — INTRODUCER, GLIDESHEATH SLENDER A-KIT, 6FR 10CM

## (undated) DEVICE — Device

## (undated) DEVICE — TR BAND, RADIAL COMPRESSION, STANDARD, 24CM

## (undated) DEVICE — GUIDEWIRE, SION BLUE PTCA, 0.041 X 190CM, STRT

## (undated) DEVICE — VALVE, HEMO, GUARDIAN II, W/GUIDEWIRE INSERTION TOOL & TORQUE

## (undated) DEVICE — GUIDEWIRE, INQWIRE, 3MM J, .035 X 210CM, FIXED

## (undated) DEVICE — GUIDE WIRE, 260CM, HI-TORQUE, VERSACORE, MODIFIED J

## (undated) DEVICE — CATHETER, GUIDING, LAUNCHER, 6FR EBU 3.5